# Patient Record
Sex: FEMALE | Race: BLACK OR AFRICAN AMERICAN | Employment: UNEMPLOYED | URBAN - METROPOLITAN AREA
[De-identification: names, ages, dates, MRNs, and addresses within clinical notes are randomized per-mention and may not be internally consistent; named-entity substitution may affect disease eponyms.]

---

## 2018-03-02 ENCOUNTER — APPOINTMENT (OUTPATIENT)
Dept: GENERAL RADIOLOGY | Age: 63
End: 2018-03-02
Attending: EMERGENCY MEDICINE
Payer: SELF-PAY

## 2018-03-02 ENCOUNTER — HOSPITAL ENCOUNTER (EMERGENCY)
Age: 63
Discharge: HOME | End: 2018-03-02
Attending: EMERGENCY MEDICINE
Payer: SELF-PAY

## 2018-03-02 VITALS
WEIGHT: 204.59 LBS | HEIGHT: 64 IN | HEART RATE: 68 BPM | DIASTOLIC BLOOD PRESSURE: 91 MMHG | RESPIRATION RATE: 19 BRPM | TEMPERATURE: 99.2 F | BODY MASS INDEX: 34.93 KG/M2 | SYSTOLIC BLOOD PRESSURE: 187 MMHG | OXYGEN SATURATION: 98 %

## 2018-03-02 DIAGNOSIS — I10 ESSENTIAL HYPERTENSION WITH GOAL BLOOD PRESSURE LESS THAN 130/80: ICD-10-CM

## 2018-03-02 DIAGNOSIS — I10 ESSENTIAL HYPERTENSION: ICD-10-CM

## 2018-03-02 DIAGNOSIS — R07.9 ACUTE CHEST PAIN: Primary | ICD-10-CM

## 2018-03-02 LAB
ANION GAP SERPL CALC-SCNC: 5 MMOL/L (ref 5–15)
ATRIAL RATE: 82 BPM
BASOPHILS # BLD: 0 K/UL (ref 0–0.1)
BASOPHILS NFR BLD: 0 % (ref 0–1)
BUN SERPL-MCNC: 15 MG/DL (ref 6–20)
BUN/CREAT SERPL: 20 (ref 12–20)
CALCIUM SERPL-MCNC: 8.9 MG/DL (ref 8.5–10.1)
CALCULATED P AXIS, ECG09: 49 DEGREES
CALCULATED R AXIS, ECG10: -7 DEGREES
CALCULATED T AXIS, ECG11: 96 DEGREES
CHLORIDE SERPL-SCNC: 105 MMOL/L (ref 97–108)
CO2 SERPL-SCNC: 31 MMOL/L (ref 21–32)
CREAT SERPL-MCNC: 0.76 MG/DL (ref 0.55–1.02)
DIAGNOSIS, 93000: NORMAL
DIFFERENTIAL METHOD BLD: ABNORMAL
EOSINOPHIL # BLD: 0.1 K/UL (ref 0–0.4)
EOSINOPHIL NFR BLD: 2 % (ref 0–7)
ERYTHROCYTE [DISTWIDTH] IN BLOOD BY AUTOMATED COUNT: 13.2 % (ref 11.5–14.5)
GLUCOSE SERPL-MCNC: 164 MG/DL (ref 65–100)
HCT VFR BLD AUTO: 38.8 % (ref 35–47)
HGB BLD-MCNC: 13 G/DL (ref 11.5–16)
IMM GRANULOCYTES # BLD: 0.1 K/UL (ref 0–0.04)
IMM GRANULOCYTES NFR BLD AUTO: 1 % (ref 0–0.5)
INR PPP: 1 (ref 0.9–1.1)
LYMPHOCYTES # BLD: 1.9 K/UL (ref 0.8–3.5)
LYMPHOCYTES NFR BLD: 24 % (ref 12–49)
MCH RBC QN AUTO: 30.4 PG (ref 26–34)
MCHC RBC AUTO-ENTMCNC: 33.5 G/DL (ref 30–36.5)
MCV RBC AUTO: 90.7 FL (ref 80–99)
MONOCYTES # BLD: 0.5 K/UL (ref 0–1)
MONOCYTES NFR BLD: 6 % (ref 5–13)
NEUTS SEG # BLD: 5.4 K/UL (ref 1.8–8)
NEUTS SEG NFR BLD: 67 % (ref 32–75)
NRBC # BLD: 0 K/UL (ref 0–0.01)
NRBC BLD-RTO: 0 PER 100 WBC
P-R INTERVAL, ECG05: 166 MS
PLATELET # BLD AUTO: 281 K/UL (ref 150–400)
PMV BLD AUTO: 9.7 FL (ref 8.9–12.9)
POTASSIUM SERPL-SCNC: 3.4 MMOL/L (ref 3.5–5.1)
PROTHROMBIN TIME: 10 SEC (ref 9–11.1)
Q-T INTERVAL, ECG07: 344 MS
QRS DURATION, ECG06: 88 MS
QTC CALCULATION (BEZET), ECG08: 401 MS
RBC # BLD AUTO: 4.28 M/UL (ref 3.8–5.2)
SODIUM SERPL-SCNC: 141 MMOL/L (ref 136–145)
TROPONIN I SERPL-MCNC: <0.04 NG/ML
VENTRICULAR RATE, ECG03: 82 BPM
WBC # BLD AUTO: 8 K/UL (ref 3.6–11)

## 2018-03-02 PROCEDURE — 71045 X-RAY EXAM CHEST 1 VIEW: CPT

## 2018-03-02 PROCEDURE — 85610 PROTHROMBIN TIME: CPT | Performed by: EMERGENCY MEDICINE

## 2018-03-02 PROCEDURE — 36415 COLL VENOUS BLD VENIPUNCTURE: CPT | Performed by: EMERGENCY MEDICINE

## 2018-03-02 PROCEDURE — 99285 EMERGENCY DEPT VISIT HI MDM: CPT

## 2018-03-02 PROCEDURE — 80048 BASIC METABOLIC PNL TOTAL CA: CPT | Performed by: EMERGENCY MEDICINE

## 2018-03-02 PROCEDURE — 93005 ELECTROCARDIOGRAM TRACING: CPT

## 2018-03-02 PROCEDURE — 74011250637 HC RX REV CODE- 250/637: Performed by: EMERGENCY MEDICINE

## 2018-03-02 PROCEDURE — 84484 ASSAY OF TROPONIN QUANT: CPT | Performed by: EMERGENCY MEDICINE

## 2018-03-02 PROCEDURE — 85025 COMPLETE CBC W/AUTO DIFF WBC: CPT | Performed by: EMERGENCY MEDICINE

## 2018-03-02 RX ORDER — ASPIRIN 325 MG
325 TABLET ORAL DAILY
Qty: 30 TAB | Refills: 0 | Status: SHIPPED | OUTPATIENT
Start: 2018-03-02 | End: 2019-05-22

## 2018-03-02 RX ORDER — AMLODIPINE BESYLATE 10 MG/1
10 TABLET ORAL DAILY
Qty: 90 TAB | Refills: 1 | Status: SHIPPED | OUTPATIENT
Start: 2018-03-02 | End: 2018-09-05 | Stop reason: SDUPTHER

## 2018-03-02 RX ORDER — ACETAMINOPHEN 500 MG
1000 TABLET ORAL
Status: DISCONTINUED | OUTPATIENT
Start: 2018-03-02 | End: 2018-03-02 | Stop reason: HOSPADM

## 2018-03-02 RX ORDER — NITROGLYCERIN 0.4 MG/1
0.4 TABLET SUBLINGUAL
Status: COMPLETED | OUTPATIENT
Start: 2018-03-02 | End: 2018-03-02

## 2018-03-02 RX ORDER — AMLODIPINE BESYLATE 5 MG/1
10 TABLET ORAL DAILY
Status: DISCONTINUED | OUTPATIENT
Start: 2018-03-02 | End: 2018-03-02 | Stop reason: HOSPADM

## 2018-03-02 RX ORDER — ASPIRIN 325 MG
325 TABLET ORAL ONCE
Status: COMPLETED | OUTPATIENT
Start: 2018-03-02 | End: 2018-03-02

## 2018-03-02 RX ORDER — LISINOPRIL 5 MG/1
10 TABLET ORAL
Status: COMPLETED | OUTPATIENT
Start: 2018-03-02 | End: 2018-03-02

## 2018-03-02 RX ORDER — LISINOPRIL 10 MG/1
10 TABLET ORAL DAILY
Qty: 90 TAB | Refills: 1 | Status: SHIPPED | OUTPATIENT
Start: 2018-03-02 | End: 2018-03-08 | Stop reason: SDUPTHER

## 2018-03-02 RX ADMIN — LISINOPRIL 10 MG: 5 TABLET ORAL at 09:05

## 2018-03-02 RX ADMIN — NITROGLYCERIN 0.5 INCH: 20 OINTMENT TOPICAL at 07:25

## 2018-03-02 RX ADMIN — ASPIRIN 325 MG: 325 TABLET ORAL at 07:25

## 2018-03-02 RX ADMIN — NITROGLYCERIN 0.4 MG: 0.4 TABLET SUBLINGUAL at 07:25

## 2018-03-02 RX ADMIN — AMLODIPINE BESYLATE 10 MG: 5 TABLET ORAL at 09:04

## 2018-03-02 NOTE — DISCHARGE INSTRUCTIONS
Chest Pain: Care Instructions  Your Care Instructions  There are many things that can cause chest pain. Some are not serious and will get better on their own in a few days. But some kinds of chest pain need more testing and treatment. Your doctor may have recommended a follow-up visit in the next 8 to 12 hours. If you are not getting better, you may need more tests or treatment. Even though your doctor has released you, you still need to watch for any problems. The doctor carefully checked you, but sometimes problems can develop later. If you have new symptoms or if your symptoms do not get better, get medical care right away. If you have worse or different chest pain or pressure that lasts more than 5 minutes or you passed out (lost consciousness), call 911 or seek other emergency help right away. A medical visit is only one step in your treatment. Even if you feel better, you still need to do what your doctor recommends, such as going to all suggested follow-up appointments and taking medicines exactly as directed. This will help you recover and help prevent future problems. How can you care for yourself at home? · Rest until you feel better. · Take your medicine exactly as prescribed. Call your doctor if you think you are having a problem with your medicine. · Do not drive after taking a prescription pain medicine. When should you call for help? Call 911 if:  ? · You passed out (lost consciousness). ? · You have severe difficulty breathing. ? · You have symptoms of a heart attack. These may include:  ¨ Chest pain or pressure, or a strange feeling in your chest.  ¨ Sweating. ¨ Shortness of breath. ¨ Nausea or vomiting. ¨ Pain, pressure, or a strange feeling in your back, neck, jaw, or upper belly or in one or both shoulders or arms. ¨ Lightheadedness or sudden weakness. ¨ A fast or irregular heartbeat.   After you call 911, the  may tell you to chew 1 adult-strength or 2 to 4 low-dose aspirin. Wait for an ambulance. Do not try to drive yourself. ?Call your doctor today if:  ? · You have any trouble breathing. ? · Your chest pain gets worse. ? · You are dizzy or lightheaded, or you feel like you may faint. ? · You are not getting better as expected. ? · You are having new or different chest pain. Where can you learn more? Go to http://patrice-alia.info/. Enter A120 in the search box to learn more about \"Chest Pain: Care Instructions. \"  Current as of: March 20, 2017  Content Version: 11.4  © 8450-3018 [employer]. Care instructions adapted under license by [employer] (which disclaims liability or warranty for this information). If you have questions about a medical condition or this instruction, always ask your healthcare professional. [address] any warranty or liability for your use of this information. DASH Diet: Care Instructions  Your Care Instructions    The DASH diet is an eating plan that can help lower your blood pressure. DASH stands for Dietary Approaches to Stop Hypertension. Hypertension is high blood pressure. The DASH diet focuses on eating foods that are high in calcium, potassium, and magnesium. These nutrients can lower blood pressure. The foods that are highest in these nutrients are fruits, vegetables, low-fat dairy products, nuts, seeds, and legumes. But taking calcium, potassium, and magnesium supplements instead of eating foods that are high in those nutrients does not have the same effect. The DASH diet also includes whole grains, fish, and poultry. The DASH diet is one of several lifestyle changes your doctor may recommend to lower your high blood pressure. Your doctor may also want you to decrease the amount of sodium in your diet. Lowering sodium while following the DASH diet can lower blood pressure even further than just the DASH diet alone.   Follow-up care is a key part of your treatment and safety. Be sure to make and go to all appointments, and call your doctor if you are having problems. It's also a good idea to know your test results and keep a list of the medicines you take. How can you care for yourself at home? Following the DASH diet  · Eat 4 to 5 servings of fruit each day. A serving is 1 medium-sized piece of fruit, ½ cup chopped or canned fruit, 1/4 cup dried fruit, or 4 ounces (½ cup) of fruit juice. Choose fruit more often than fruit juice. · Eat 4 to 5 servings of vegetables each day. A serving is 1 cup of lettuce or raw leafy vegetables, ½ cup of chopped or cooked vegetables, or 4 ounces (½ cup) of vegetable juice. Choose vegetables more often than vegetable juice. · Get 2 to 3 servings of low-fat and fat-free dairy each day. A serving is 8 ounces of milk, 1 cup of yogurt, or 1 ½ ounces of cheese. · Eat 6 to 8 servings of grains each day. A serving is 1 slice of bread, 1 ounce of dry cereal, or ½ cup of cooked rice, pasta, or cooked cereal. Try to choose whole-grain products as much as possible. · Limit lean meat, poultry, and fish to 2 servings each day. A serving is 3 ounces, about the size of a deck of cards. · Eat 4 to 5 servings of nuts, seeds, and legumes (cooked dried beans, lentils, and split peas) each week. A serving is 1/3 cup of nuts, 2 tablespoons of seeds, or ½ cup of cooked beans or peas. · Limit fats and oils to 2 to 3 servings each day. A serving is 1 teaspoon of vegetable oil or 2 tablespoons of salad dressing. · Limit sweets and added sugars to 5 servings or less a week. A serving is 1 tablespoon jelly or jam, ½ cup sorbet, or 1 cup of lemonade. · Eat less than 2,300 milligrams (mg) of sodium a day. If you limit your sodium to 1,500 mg a day, you can lower your blood pressure even more. Tips for success  · Start small. Do not try to make dramatic changes to your diet all at once.  You might feel that you are missing out on your favorite foods and then be more likely to not follow the plan. Make small changes, and stick with them. Once those changes become habit, add a few more changes. · Try some of the following:  ¨ Make it a goal to eat a fruit or vegetable at every meal and at snacks. This will make it easy to get the recommended amount of fruits and vegetables each day. ¨ Try yogurt topped with fruit and nuts for a snack or healthy dessert. ¨ Add lettuce, tomato, cucumber, and onion to sandwiches. ¨ Combine a ready-made pizza crust with low-fat mozzarella cheese and lots of vegetable toppings. Try using tomatoes, squash, spinach, broccoli, carrots, cauliflower, and onions. ¨ Have a variety of cut-up vegetables with a low-fat dip as an appetizer instead of chips and dip. ¨ Sprinkle sunflower seeds or chopped almonds over salads. Or try adding chopped walnuts or almonds to cooked vegetables. ¨ Try some vegetarian meals using beans and peas. Add garbanzo or kidney beans to salads. Make burritos and tacos with mashed alford beans or black beans. Where can you learn more? Go to http://patrice-alia.info/. Enter P559 in the search box to learn more about \"DASH Diet: Care Instructions. \"  Current as of: September 21, 2016  Content Version: 11.4  © 8398-1463 [employer]. Care instructions adapted under license by [employer] (which disclaims liability or warranty for this information). If you have questions about a medical condition or this instruction, always ask your healthcare professional. [place] [address] any warranty or liability for your use of this information.

## 2018-03-02 NOTE — ED NOTES
Complaint of left sided chest pressure, intermittent, feeling like heaviness, since yesterday around 1630. Pt denies associated sob, n/v/diaphoresis, relieving or aggravating factors.

## 2018-03-02 NOTE — ED NOTES
Dr Yoly Faria reviewed discharge instructions with the patient. The patient verbalized understanding. All questions and concerns were addressed. The patient declined a wheelchair and is discharged ambulatory in the care of family members with instructions and prescriptions in hand. Pt is alert and oriented x 4. Respirations are clear and unlabored.

## 2018-03-02 NOTE — CONSULTS
Consult    NAME: Bret Fletcher   :  1955   MRN:  [id number]     Date/Time:  3/2/2018 9:48 AM    Patient PCP: Althea Bennett MD  ________________________________________________________________________     Assessment:     1. Uncontrolled hypertension, atypical chest pain  2. Reports prior negative stress test  3. ECG with LVH with repolarization  4. HTN  5. Allergies  6. Negative family hx of for premature CAD  7. No prior tobacco use  8. Non-compliance with meds due to financial issues  9. ,  disabled vet, currently unemployed, says very active         Plan:     Ran out of meds six months ago. Yesterday atypical chest pain, ECG with LVH, negative cardiac enzymes. Feels better with control of blood pressure. 1. Start aspirin   2. Resume amlodipine 10mg daily  3. Resume lisinopril 10mg daily     to see and provide primary care/medicine assistance. Plan for discharge as long as bp improving, feeling ok. Return to ER with any exertional pain. [x]        High complexity decision making was performed        Subjective:   CHIEF COMPLAINT: Chest pain      HISTORY OF PRESENT ILLNESS:       Bret Fletcher, 63 y.o. female with PMHx significant for HTN, presents ambulatory to the ED with cc of episode of diffuse upper CP, onset yesterday afternoon. She rates the pain at onset moderate, describes as sharp/\"punching\", and denies modifying factors or associated symptoms. Pt denies taking medication for pain. She states the episode was 10 minutes in duration and reports a \"lingering ache\" today, prompting ED evaluation. Pt denies hx of prior similar sx. She denies hx of cardiology evaluation. Pt reports hx of HTN and notes her BP has been elevated at every doctor's appointment. She notes ankle swelling at baseline, denies recent worsening. Pt specifically denies fever/chills, SOB, cough, abdominal pain, or N/V/D.      Atypical left chest pulling yesterday, lasted for 10 min, resolved on own. No radiation, no nausea, no diaphoresis. Currently feeling well after administration of bp meds. We were asked to consult for work up and evaluation of the above problems. Past Medical History:   Diagnosis Date    Hepatic steatosis 2013    CT imaging.  Hypertension     Pap smear for cervical cancer screening 07/2016    Normal with negative HPV--repeat 2yr with gyn. History reviewed. No pertinent surgical history. Allergies   Allergen Reactions    Naprosyn [Naproxen] Other (comments)     Hallucinations. Tolerates Ibuprofen without problems. Meds:  See below  Social History   Substance Use Topics    Smoking status: Never Smoker    Smokeless tobacco: Never Used    Alcohol use 0.0 oz/week     0 Standard drinks or equivalent per week      Comment: occasionally      Family History   Problem Relation Age of Onset    Heart Disease Father     Hypertension Father     Glaucoma Father     Other Mother      ulcers       REVIEW OF SYSTEMS:     []         Unable to obtain  ROS due to ---   [x]         Total of 12 systems reviewed as follows:     Total of 12 systems reviewed as follows:       POSITIVE= Bold text  Negative = normal text  General:  fever, chills, sweats, generalized weakness, weight loss/gain,      loss of appetite   Eyes:    blurred vision, eye pain, loss of vision, double vision  ENT:    rhinorrhea, pharyngitis   Respiratory:   cough, sputum production, SOB, MORGAN, wheezing, pleuritic pain   Cardiology:   chest pain, palpitations, orthopnea, PND, edema, syncope   Gastrointestinal:  abdominal pain , N/V, diarrhea, dysphagia, constipation, bleeding   Genitourinary:  frequency, urgency, dysuria, hematuria, incontinence   Muskuloskeletal :  arthralgia, myalgia, back pain  Hematology:  easy bruising, nose or gum bleeding, lymphadenopathy   Dermatological: rash, ulceration, pruritis, color change / jaundice  Endocrine:   hot flashes or polydipsia Neurological:  headache, dizziness, confusion, focal weakness, paresthesia,     Speech difficulties, memory loss, gait difficulty  Psychological: Feelings of anxiety, depression, agitation    Objective:      Physical Exam:    Last 24hrs VS reviewed since prior progress note. Most recent are:    Visit Vitals    BP (!) 196/98    Pulse 78    Temp 99.2 °F (37.3 °C)    Resp 22    Ht 5' 4\" (1.626 m)    Wt 92.8 kg (204 lb 9.4 oz)    SpO2 97%    BMI 35.12 kg/m2     No intake or output data in the 24 hours ending 03/02/18 0948     General Appearance: Well developed, well nourished, alert & oriented x 3,    no acute distress. Ears/Nose/Mouth/Throat: Pupils equal and round, Hearing grossly normal.  Neck: Supple. JVP within normal limits. Carotids good upstrokes, with no bruit. Chest: Lungs clear to auscultation bilaterally. Cardiovascular: Regular rate and rhythm, S1S2 normal, no murmur, rubs, gallops. Abdomen: Soft, non-tender, bowel sounds are active. No organomegaly. Extremities: No edema bilaterally. Femoral pulses +2, Distal Pulses +1. Skin: Warm and dry. Neuro: CN II-XII grossly intact, Strength and sensation grossly intact. Data:      Prior to Admission medications    Medication Sig Start Date End Date Taking? Authorizing Provider   aspirin (ASPIRIN) 325 mg tablet Take 1 Tab by mouth daily. 3/2/18  Yes Beka Zelaya. MD Ifeanyi   amLODIPine (NORVASC) 10 mg tablet Take 1 Tab by mouth daily. 3/2/18  Yes Beka Zelaya. MD Ifeanyi   lisinopril (PRINIVIL, ZESTRIL) 10 mg tablet Take 1 Tab by mouth daily. Take daily with 10mg amlodipine tablet as reviewed. 3/2/18  Yes Beka Zelaya. MD Ifeanyi   cetirizine (ZYRTEC) 10 mg tablet Take 1 Tab by mouth nightly.  8/22/16   Lara Granados MD       Recent Results (from the past 24 hour(s))   EKG, 12 LEAD, INITIAL    Collection Time: 03/02/18  7:02 AM   Result Value Ref Range    Ventricular Rate 82 BPM    Atrial Rate 82 BPM    P-R Interval 166 ms    QRS Duration 88 ms    Q-T Interval 344 ms    QTC Calculation (Bezet) 401 ms    Calculated P Axis 49 degrees    Calculated R Axis -7 degrees    Calculated T Axis 96 degrees    Diagnosis       Normal sinus rhythm  Possible Left atrial enlargement  Left ventricular hypertrophy  Nonspecific ST and T wave abnormality  No previous ECGs available  Confirmed by Berto Narayanan ([id number]) on 3/2/2018 8:42:01 AM     CBC WITH AUTOMATED DIFF    Collection Time: 03/02/18  7:24 AM   Result Value Ref Range    WBC 8.0 3.6 - 11.0 K/uL    RBC 4.28 3.80 - 5.20 M/uL    HGB 13.0 11.5 - 16.0 g/dL    HCT 38.8 35.0 - 47.0 %    MCV 90.7 80.0 - 99.0 FL    MCH 30.4 26.0 - 34.0 PG    MCHC 33.5 30.0 - 36.5 g/dL    RDW 13.2 11.5 - 14.5 %    PLATELET 983 229 - 921 K/uL    MPV 9.7 8.9 - 12.9 FL    NRBC 0.0 0  WBC    ABSOLUTE NRBC 0.00 0.00 - 0.01 K/uL    NEUTROPHILS 67 32 - 75 %    LYMPHOCYTES 24 12 - 49 %    MONOCYTES 6 5 - 13 %    EOSINOPHILS 2 0 - 7 %    BASOPHILS 0 0 - 1 %    IMMATURE GRANULOCYTES 1 (H) 0.0 - 0.5 %    ABS. NEUTROPHILS 5.4 1.8 - 8.0 K/UL    ABS. LYMPHOCYTES 1.9 0.8 - 3.5 K/UL    ABS. MONOCYTES 0.5 0.0 - 1.0 K/UL    ABS. EOSINOPHILS 0.1 0.0 - 0.4 K/UL    ABS. BASOPHILS 0.0 0.0 - 0.1 K/UL    ABS. IMM.  GRANS. 0.1 (H) 0.00 - 0.04 K/UL    DF AUTOMATED     METABOLIC PANEL, BASIC    Collection Time: 03/02/18  7:24 AM   Result Value Ref Range    Sodium 141 136 - 145 mmol/L    Potassium 3.4 (L) 3.5 - 5.1 mmol/L    Chloride 105 97 - 108 mmol/L    CO2 31 21 - 32 mmol/L    Anion gap 5 5 - 15 mmol/L    Glucose 164 (H) 65 - 100 mg/dL    BUN 15 6 - 20 MG/DL    Creatinine 0.76 0.55 - 1.02 MG/DL    BUN/Creatinine ratio 20 12 - 20      GFR est AA >60 >60 ml/min/1.73m2    GFR est non-AA >60 >60 ml/min/1.73m2    Calcium 8.9 8.5 - 10.1 MG/DL   PROTHROMBIN TIME + INR    Collection Time: 03/02/18  7:24 AM   Result Value Ref Range    INR 1.0 0.9 - 1.1      Prothrombin time 10.0 9.0 - 11.1 sec   TROPONIN I    Collection Time: 03/02/18  7:24 AM   Result Value Ref Range    Troponin-I, Qt. <0.04 <0.05 ng/mL

## 2018-03-02 NOTE — ED PROVIDER NOTES
EMERGENCY DEPARTMENT HISTORY AND PHYSICAL EXAM      Date: 3/2/2018  Patient Name: Tracy Awan    History of Presenting Illness     No chief complaint on file. History Provided By: Patient    HPI: Tracy Awan, 63 y.o. female with PMHx significant for HTN, presents ambulatory to the ED with cc of episode of non-radiating diffuse upper CP, onset yesterday afternoon. She rates the pain at onset moderate, describes as sharp/\"punching\", and denies modifying factors or associated symptoms. Pt denies taking medication for pain. She states the episode was 10 minutes in duration and reports a \"lingering ache\" today, prompting ED evaluation. Pt denies hx of prior similar sx. She denies hx of cardiology evaluation. Pt reports hx of HTN and notes her BP has been elevated at every doctor's appointment. She notes ankle swelling at baseline, denies recent worsening. Pt specifically denies fever/chills, SOB, cough, abdominal pain, or N/V/D.     PCP: Joann Chase MD    There are no other complaints, changes, or physical findings at this time. Current Outpatient Prescriptions   Medication Sig Dispense Refill    amLODIPine (NORVASC) 10 mg tablet Take 1 Tab by mouth daily. 90 Tab 1    lisinopril (PRINIVIL, ZESTRIL) 10 mg tablet Take 1 Tab by mouth daily. Take daily with 10mg amlodipine tablet as reviewed. 90 Tab 1    Blood Pressure Monitor kit 1 Each by Does Not Apply route as needed. 1 Kit 0    cetirizine (ZYRTEC) 10 mg tablet Take 1 Tab by mouth nightly. 30 Tab 5    meloxicam (MOBIC) 15 mg tablet Take 1 Tab by mouth daily. 30 Tab 0       Past History     Past Medical History:  Past Medical History:   Diagnosis Date    Hepatic steatosis 2013    CT imaging.  Hypertension     Pap smear for cervical cancer screening 07/2016    Normal with negative HPV--repeat 2yr with gyn. Past Surgical History:  No past surgical history on file.     Family History:  Family History   Problem Relation Age of Onset  Heart Disease Father     Hypertension Father     Glaucoma Father     Other Mother      ulcers       Social History:  Social History   Substance Use Topics    Smoking status: Never Smoker    Smokeless tobacco: Never Used    Alcohol use 0.0 oz/week     0 Standard drinks or equivalent per week      Comment: occasionally       Allergies: Allergies   Allergen Reactions    Naprosyn [Naproxen] Other (comments)     Hallucinations. Tolerates Ibuprofen without problems. Review of Systems   Review of Systems   Constitutional: Negative for activity change, appetite change, chills, fever and unexpected weight change. HENT: Negative for congestion. Eyes: Negative for pain and visual disturbance. Respiratory: Negative for cough and shortness of breath. Cardiovascular: Positive for chest pain and leg swelling (ankle, denies recent worsening). Gastrointestinal: Negative for abdominal pain, diarrhea, nausea and vomiting. Genitourinary: Negative for dysuria. Musculoskeletal: Negative for back pain. Skin: Negative for rash. Neurological: Negative for headaches. All other systems reviewed and are negative. Physical Exam   Physical Exam   Constitutional: She is oriented to person, place, and time. She appears well-developed and well-nourished. HENT:   Head: Normocephalic and atraumatic. Mouth/Throat: Oropharynx is clear and moist.   Eyes: Conjunctivae and EOM are normal. Pupils are equal, round, and reactive to light. Right eye exhibits no discharge. Left eye exhibits no discharge. Neck: Normal range of motion. Neck supple. Cardiovascular: Normal rate, regular rhythm and normal heart sounds. No murmur heard. Pulmonary/Chest: Effort normal and breath sounds normal. No respiratory distress. She has no wheezes. She has no rales. Abdominal: Soft. Bowel sounds are normal. She exhibits no distension. There is no tenderness. Musculoskeletal: Normal range of motion.  She exhibits no edema. Neurological: She is alert and oriented to person, place, and time. No cranial nerve deficit. She exhibits normal muscle tone. Skin: Skin is warm and dry. No rash noted. She is not diaphoretic. Nursing note and vitals reviewed. Diagnostic Study Results     Labs -     Recent Results (from the past 12 hour(s))   EKG, 12 LEAD, INITIAL    Collection Time: 03/02/18  7:02 AM   Result Value Ref Range    Ventricular Rate 82 BPM    Atrial Rate 82 BPM    P-R Interval 166 ms    QRS Duration 88 ms    Q-T Interval 344 ms    QTC Calculation (Bezet) 401 ms    Calculated P Axis 49 degrees    Calculated R Axis -7 degrees    Calculated T Axis 96 degrees    Diagnosis       Normal sinus rhythm  Possible Left atrial enlargement  Left ventricular hypertrophy  Nonspecific ST and T wave abnormality  No previous ECGs available  Confirmed by Sheryle Neth ([id number]) on 3/2/2018 8:42:01 AM     CBC WITH AUTOMATED DIFF    Collection Time: 03/02/18  7:24 AM   Result Value Ref Range    WBC 8.0 3.6 - 11.0 K/uL    RBC 4.28 3.80 - 5.20 M/uL    HGB 13.0 11.5 - 16.0 g/dL    HCT 38.8 35.0 - 47.0 %    MCV 90.7 80.0 - 99.0 FL    MCH 30.4 26.0 - 34.0 PG    MCHC 33.5 30.0 - 36.5 g/dL    RDW 13.2 11.5 - 14.5 %    PLATELET 039 828 - 411 K/uL    MPV 9.7 8.9 - 12.9 FL    NRBC 0.0 0  WBC    ABSOLUTE NRBC 0.00 0.00 - 0.01 K/uL    NEUTROPHILS 67 32 - 75 %    LYMPHOCYTES 24 12 - 49 %    MONOCYTES 6 5 - 13 %    EOSINOPHILS 2 0 - 7 %    BASOPHILS 0 0 - 1 %    IMMATURE GRANULOCYTES 1 (H) 0.0 - 0.5 %    ABS. NEUTROPHILS 5.4 1.8 - 8.0 K/UL    ABS. LYMPHOCYTES 1.9 0.8 - 3.5 K/UL    ABS. MONOCYTES 0.5 0.0 - 1.0 K/UL    ABS. EOSINOPHILS 0.1 0.0 - 0.4 K/UL    ABS. BASOPHILS 0.0 0.0 - 0.1 K/UL    ABS. IMM.  GRANS. 0.1 (H) 0.00 - 0.04 K/UL    DF AUTOMATED     METABOLIC PANEL, BASIC    Collection Time: 03/02/18  7:24 AM   Result Value Ref Range    Sodium 141 136 - 145 mmol/L    Potassium 3.4 (L) 3.5 - 5.1 mmol/L    Chloride 105 97 - 108 mmol/L    CO2 31 21 - 32 mmol/L    Anion gap 5 5 - 15 mmol/L    Glucose 164 (H) 65 - 100 mg/dL    BUN 15 6 - 20 MG/DL    Creatinine 0.76 0.55 - 1.02 MG/DL    BUN/Creatinine ratio 20 12 - 20      GFR est AA >60 >60 ml/min/1.73m2    GFR est non-AA >60 >60 ml/min/1.73m2    Calcium 8.9 8.5 - 10.1 MG/DL   PROTHROMBIN TIME + INR    Collection Time: 03/02/18  7:24 AM   Result Value Ref Range    INR 1.0 0.9 - 1.1      Prothrombin time 10.0 9.0 - 11.1 sec   TROPONIN I    Collection Time: 03/02/18  7:24 AM   Result Value Ref Range    Troponin-I, Qt. <0.04 <0.05 ng/mL       Radiologic Studies -   EXAM:  XR CHEST PORT     INDICATION: Chest Pain     COMPARISON: 2/27/2016.     TECHNIQUE: Single frontal view of the chest.     FINDINGS:  Mildly hypoventilatory exam..  The cardiomediastinal silhouette is  within normal limits given the technique. No acute or aggressive osseous  lesion. .       IMPRESSION  IMPRESSION: No evidence of an acute cardiopulmonary process.              Medical Decision Making   I am the first provider for this patient. I reviewed the vital signs, available nursing notes, past medical history, past surgical history, family history and social history. Vital Signs-Reviewed the patient's vital signs. Patient Vitals for the past 12 hrs:   Temp Pulse Resp BP SpO2   03/02/18 1000 - 68 19 (!) 187/91 98 %   03/02/18 0930 - 65 19 (!) 199/94 99 %   03/02/18 0900 - 72 22 (!) 197/105 98 %   03/02/18 0830 - 69 21 (!) 189/95 98 %   03/02/18 0800 - 78 22 (!) 196/98 97 %   03/02/18 0735 - - - (!) 183/94 -   03/02/18 0730 - 80 18 (!) 169/109 99 %   03/02/18 0715 - 76 17 (!) 199/119 96 %   03/02/18 0710 99.2 °F (37.3 °C) 86 18 (!) 201/100 98 %     EKG interpretation: (4353)  Rhythm: normal sinus rhythm; and regular . Rate (approx.): 82; Axis: normal; AL interval: normal; QRS interval: normal ; ST/T wave: T wave inversion in I, AVL, V5-6; Other findings: left ventricular hypertrophy.   Written by BERNABE Miller, as dictated by Calixto Kinney MD.    Records Reviewed: Nursing Notes and Old Medical Records    Provider Notes (Medical Decision Making): Moderate risk middle-aged female with abnormal EKG and prior to compare, c/f prior ischemia. Currently hypertensive but pt admits to elevated BP at any doctor's visit. She does not have any clinical evidence of malignant HTN at this time. ED Course:   Initial assessment performed. The patients presenting problems have been discussed, and they are in agreement with the care plan formulated and outlined with them. I have encouraged them to ask questions as they arise throughout their visit. Progress Note:  8:30 AM  Pt re-evaluated. She reports her CP has resolved. Pt notes she has been off her HTN medication for months due to an insurance change. Written by BERNABE Quintero, as dictated by Calixto Kinney MD.     CONSULT NOTE:   8:46 AM  Calixto Kinney MD spoke with Dr. Vika Ruiz,   Specialty: Cardiology  Discussed pt's hx, disposition, and available diagnostic and imaging results. Reviewed care plans. Consultant agrees with plans as outlined. Dr. Vika Ruiz to evaluate the pt in the ED. Written by BERNABE Quintero, as dictated by Calixto Kinney MD.     Progress Note:  10:15 AM  Dr. Vika Ruiz evaluated pt in the ED. He agrees with anti-hypertensives and outpatient management. Written by BERNABE Quintero, as dictated by Calixto Kinney MD.     Disposition:    DISCHARGE NOTE  10:04 AM  The patient has been re-evaluated and is ready for discharge. Reviewed available results with patient. Counseled pt on diagnosis and care plan. Pt has expressed understanding, and all questions have been answered. Pt agrees with plan and agrees to F/U as recommended, or return to the ED if their sxs worsen. Discharge instructions have been provided and explained to the pt, along with reasons to return to the ED.   Written by BERNABE Quintero, as dictated by Giovanny Joseph MD.    PLAN:  1. Current Discharge Medication List      START taking these medications    Details   aspirin (ASPIRIN) 325 mg tablet Take 1 Tab by mouth daily. Qty: 30 Tab, Refills: 0         CONTINUE these medications which have CHANGED    Details   amLODIPine (NORVASC) 10 mg tablet Take 1 Tab by mouth daily. Qty: 90 Tab, Refills: 1    Associated Diagnoses: Essential hypertension with goal blood pressure less than 130/80      lisinopril (PRINIVIL, ZESTRIL) 10 mg tablet Take 1 Tab by mouth daily. Take daily with 10mg amlodipine tablet as reviewed. Qty: 90 Tab, Refills: 1    Associated Diagnoses: Essential hypertension with goal blood pressure less than 130/80           2. Follow-up Information     Follow up With Details Comments Contact Info    [facility] EMERGENCY DEPT  If symptoms worsen [address] Shanda Naidu MD Schedule an appointment as soon as possible for a visit  [address]  [id number]  [phone]          Return to ED if worse     Diagnosis     Clinical Impression:   1. Acute chest pain    2. Essential hypertension    3. Essential hypertension with goal blood pressure less than 130/80        Attestations: This note is prepared by Gale Shrestha, acting as Scribe for Giovanny Joseph MD.    Giovanny Joseph MD: The scribe's documentation has been prepared under my direction and personally reviewed by me in its entirety. I confirm that the note above accurately reflects all work, treatment, procedures, and medical decision making performed by me.

## 2018-03-02 NOTE — ED NOTES
After administration of nitroglycerin, pt initially reported a headache and requested Tylenol. MD informed. Verbal order for Tylenol received; however, by the time Tylenol was brought to the bedside, pt stated that her pain had decreased somewhat and she did not feel the need for medication. Tylenol returned to pyxis.

## 2018-03-08 ENCOUNTER — OFFICE VISIT (OUTPATIENT)
Dept: INTERNAL MEDICINE CLINIC | Age: 63
End: 2018-03-08

## 2018-03-08 VITALS
HEIGHT: 64 IN | WEIGHT: 200 LBS | TEMPERATURE: 98 F | OXYGEN SATURATION: 97 % | HEART RATE: 78 BPM | SYSTOLIC BLOOD PRESSURE: 179 MMHG | BODY MASS INDEX: 34.15 KG/M2 | RESPIRATION RATE: 16 BRPM | DIASTOLIC BLOOD PRESSURE: 94 MMHG

## 2018-03-08 DIAGNOSIS — I10 ESSENTIAL HYPERTENSION WITH GOAL BLOOD PRESSURE LESS THAN 130/80: ICD-10-CM

## 2018-03-08 RX ORDER — LISINOPRIL 10 MG/1
20 TABLET ORAL DAILY
Qty: 90 TAB | Refills: 1
Start: 2018-03-08 | End: 2018-06-05 | Stop reason: SDUPTHER

## 2018-03-08 NOTE — PROGRESS NOTES
RM 16    Chief Complaint   Patient presents with    Chest Pain     followup from ER on 3/2/18, chest tender/sore     1. Have you been to the ER, urgent care clinic since your last visit? Hospitalized since your last visit? Yes ED [facility] 3/2/18 for chest pain    2. Have you seen or consulted any other health care providers outside of the [address] [place] [address] since your last visit? Include any pap smears or colon screening. No    Health Maintenance Due   Topic Date Due    Hepatitis C Screening  1955    DTaP/Tdap/Td series (1 - Tdap) 03/07/1978    BREAST CANCER SCRN MAMMOGRAM  06/19/2014    ZOSTER VACCINE AGE 60>  01/07/2017    Influenza Age 9 to Adult  08/01/2017     Learning Assessment 3/8/2018   PRIMARY LEARNER Patient   HIGHEST LEVEL OF EDUCATION - PRIMARY LEARNER  GRADUATED HIGH SCHOOL OR GED   BARRIERS PRIMARY LEARNER NONE   PRIMARY LANGUAGE ENGLISH   LEARNER PREFERENCE PRIMARY DEMONSTRATION     READING     LISTENING   ANSWERED BY patient   RELATIONSHIP SELF       Patient declined flu vaccine.      PHQ over the last two weeks 3/8/2018   Little interest or pleasure in doing things Not at all   Feeling down, depressed or hopeless Several days   Total Score PHQ 2 1

## 2018-03-08 NOTE — PROGRESS NOTES
History of Present Illness:   Cee Washington is a 66 y.o. female here for evaluation:    Chief Complaint   Patient presents with    Chest Pain     followup from ER on 3/2/18, chest tender/sore     Last visit here Oct 2016--recommended 1mo follow-up for elev BP but had not returned for re-eval.  Last BP med refill from here Sept 2016 for 6mo supply. Refilled in ED for 90-days with recent visit there. She had elev BP in ED and CP--cardiac eval negative. She had resolution of CP in ED. Reviewed ED eval, labs, EKG (report and tracing). Copy of labs to pt today at visit. She notes daughter helped her fill her meds. She paid$60 for two 90-day prescriptions of her BP meds. She also got a 30-day aspirin script then. Reviewed free clinic information and Care Card access/application. Reviewed options for medication coverage with pt. Past Medical History:   Diagnosis Date    Hepatic steatosis 2013    CT imaging.  Hypertension     Pap smear for cervical cancer screening 07/2016    Normal with negative HPV--repeat 2yr with gyn. Prior to Admission medications    Medication Sig Start Date End Date Taking? Authorizing Provider   aspirin (ASPIRIN) 325 mg tablet Take 1 Tab by mouth daily. 3/2/18  Yes [employer]. MD Ifeanyi   amLODIPine (NORVASC) 10 mg tablet Take 1 Tab by mouth daily. 3/2/18  Yes [employer]. MD Ifeanyi   lisinopril (PRINIVIL, ZESTRIL) 10 mg tablet Take 1 Tab by mouth daily. Take daily with 10mg amlodipine tablet as reviewed. 3/2/18  Yes [employer]. MD Ifeanyi   cetirizine (ZYRTEC) 10 mg tablet Take 1 Tab by mouth nightly. 8/22/16   Ledy Michelle MD        ROS    Vitals:    03/08/18 0854   BP: (!) 179/94   Pulse: 78   Resp: 16   Temp: 98 °F (36.7 °C)   TempSrc: Oral   SpO2: 97%   Weight: 200 lb (90.7 kg)   Height: 5' 4\" (1.626 m)   PainSc:   3   PainLoc: Chest        Physical Exam:     Physical Exam   Constitutional: She appears well-developed and well-nourished.  No distress. HENT:   Head: Normocephalic and atraumatic. Eyes: Conjunctivae are normal. Right eye exhibits no discharge. Left eye exhibits no discharge. No scleral icterus. Neck: Neck supple. Cardiovascular: Normal rate, regular rhythm, normal heart sounds and intact distal pulses. Exam reveals no gallop and no friction rub. No murmur heard. Pulmonary/Chest: Effort normal and breath sounds normal. No respiratory distress. She has no wheezes. She has no rales. She exhibits no tenderness. Abdominal: Soft. Bowel sounds are normal. She exhibits no distension. There is no tenderness. Musculoskeletal: She exhibits no edema or tenderness. Neurological: She is alert. She exhibits normal muscle tone. Coordination normal.   Skin: Skin is warm. No rash noted. She is not diaphoretic. No erythema. No pallor. Psychiatric: She has a normal mood and affect. Her behavior is normal. Judgment and thought content normal.       Assessment and Plan:       ICD-10-CM ICD-9-CM    1. Essential hypertension with goal blood pressure less than 130/80 I10 401.9 lisinopril (PRINIVIL, ZESTRIL) 10 mg tablet       Reviewed EKG with pt and daughter at visit. Reviewed cards referral to Dr. Mari Scott. Plan at next visit once plan for care (marketplace, CareCard, free clinic/Access Now) reviewed. Follow-up Disposition:  Return in about 2 weeks (around 3/22/2018) for Blood Pressure follow-up.  reviewed medications and side effects in detail  use of aspirin to prevent MI and TIA's discussed  radiology results and schedule of future radiology studies reviewed with patient    For additional documentation of information and/or recommendations discussed this visit, please see notes in instructions. Plan and evaluation (above) reviewed with pt at visit  Patient voiced understanding of plan and provided with time to ask/review questions.   After Visit Summary (AVS) provided to pt after visit with additional instructions as needed/reviewed. Greater than 50% of the time in this 45 visit was spent by me in face-to-face counseling and coordination of care.

## 2018-03-08 NOTE — PATIENT INSTRUCTIONS
1.  Please call the Advanced Patient Advocacy number on the card to determine what options are available and if you qualify for [facility]. 2.  Please call Yaakov Segal LPN Panel manager here--she will review other options for medication coverage while you are clarifying the insurance/Care Card coverage as above. 3.  You can see in the interim, if other pharmacies offer generic costs for the two blood pressure meds you are taking. 4.  Until the 10mg lisinopril tabs are gone take 2 daily. You can take both with your morning Amlodipine 10mg dose. Once you have completed current prescription, we can refill as a single 20mg tab as reviewed. 5.  Free clinics in [facility] include:    [place] for [facility] Blood Pressure  [address]   [phone]    [facility]  Address: [address], [address], [facility] [address] [place] [address]  Phone: [phone], ext. [address] [facility] Hours  Monday through Thursday: 8:30 am - 5 pm   Friday: 8:30 am - 3 pm  On the first Friday of the month, both clinics are open 8:30 am - 12:30 pm      [facility] [place]  Address: [address] [facility] [place] [address], [address] [facility]  Across from [facility]; enter at the back of the building. Phone: [address] Hours  Monday, Wednesday, Thursday: 8:30 am - 5 pm   Tuesday: 8:30 am - 8 pm   Friday: 8:30 am - 3 pm  On the first Friday of the month, both clinics are open 8:30 am - 12:30 pm      [address] [facility]  Patients are seen at [facility] by appointment only. To make a medical or registration appointment, please call the clinic at [phone] between the hours of 9:00AM-12:00PM and 1:00PM-5:00PM, Monday through Thursday.

## 2018-03-08 NOTE — MR AVS SNAPSHOT
[address] [place] [address] 
[phone] Patient: Lizy Vo MRN: [id number] Visit Information Date & Time Provider Department Dept. Phone Encounter #  
 3/8/2018  8:30 AM Deepa Blanco, [address] [place] [address] and Internal Medicine [phone] Follow-up Instructions Return in about 2 weeks (around 3/22/2018) for Blood Pressure follow-up. Upcoming Health Maintenance Date Due Hepatitis C Screening 1955 DTaP/Tdap/Td series (1 - Tdap) 3/7/1978 BREAST CANCER SCRN MAMMOGRAM 2014 ZOSTER VACCINE AGE 60> 2017 Influenza Age 5 to Adult 2017 PAP AKA CERVICAL CYTOLOGY 2019 COLONOSCOPY 2019 Allergies as of 3/8/2018  Review Complete On: 3/8/2018 By: Deepa Blanco MD  
  
 Severity Noted Reaction Type Reactions Naprosyn [Naproxen]  2016   Intolerance Other (comments) Hallucinations. Tolerates Ibuprofen without problems. Current Immunizations  Reviewed on 3/2/2018 Name Date Influenza Vaccine (Quad) PF 2016 Not reviewed this visit You Were Diagnosed With   
  
 Codes Comments Essential hypertension with goal blood pressure less than 130/80     ICD-10-CM: I10 
ICD-9-CM: 401.9 Vitals BP Pulse Temp Resp Height(growth percentile) Weight(growth percentile) (!) 179/94 (BP 1 Location: Right arm, BP Patient Position: Sitting) 78 98 °F (36.7 °C) (Oral) 16 5' 4\" (1.626 m) 200 lb (90.7 kg) SpO2 BMI OB Status Smoking Status 97% 34.33 kg/m2 Postmenopausal Never Smoker BMI and BSA Data Body Mass Index Body Surface Area  
 34.33 kg/m 2 2.02 m 2 Preferred Pharmacy Pharmacy Name Phone [facility] [address], [facility] [address] [phone] Your Updated Medication List  
  
   
 This list is accurate as of 3/8/18  9:43 AM.  Always use your most recent med list. amLODIPine 10 mg tablet Commonly known as:  Larayne Alcides Take 1 Tab by mouth daily. aspirin 325 mg tablet Commonly known as:  ASPIRIN Take 1 Tab by mouth daily. cetirizine 10 mg tablet Commonly known as:  ZYRTEC Take 1 Tab by mouth nightly. lisinopril 10 mg tablet Commonly known as:  Queen Daruis Take 2 Tabs by mouth daily. Take daily with 10mg amlodipine tablet as reviewed. Follow-up Instructions Return in about 2 weeks (around 3/22/2018) for Blood Pressure follow-up. Patient Instructions 1. Please call the Advanced Patient Advocacy number on the card to determine what options are available and if you qualify for [facility]. 2.  Please call Timothy Galvan LPN Panel manager here--she will review other options for medication coverage while you are clarifying the insurance/Care Card coverage as above. 3.  You can see in the interim, if other pharmacies offer generic costs for the two blood pressure meds you are taking. 4.  Until the 10mg lisinopril tabs are gone take 2 daily. You can take both with your morning Amlodipine 10mg dose. Once you have completed current prescription, we can refill as a single 20mg tab as reviewed. 5.  Free clinics in [facility] include: 
 
[facility] [address] [facility] [address]  
[phone] [facility] Address: [address] [place] [address], [place], [address] [facility] [address] Phone: [phone], ext. [phone] Clinic Hours Monday through Thursday: 8:30 am  5 pm  
Friday: 8:30 am  3 pm 
On the first Friday of the month, both clinics are open 8:30 am  12:30 pm 
 
 
[facility] [place] 
Address: [address] [facility] [place] [address], [address] [place] [address] Across from [facility]; enter at the back of the building. Phone: [phone] Clinic Hours Monday, Wednesday, Thursday: 8:30 am  5 pm  
 Tuesday: 8:30 am  8 pm  
Friday: 8:30 am  3 pm 
On the first Friday of the month, both clinics are open 8:30 am  12:30 pm 
 
 
[address] [place] Patients are seen at [facility] by appointment only. To make a medical or registration appointment, please call the clinic at [phone] between the hours of 9:00AM-12:00PM and 1:00PM-5:00PM, Monday through Thursday. Introducing [facility] SERVICES! Dear Aristeo Her: 
Thank you for requesting a [employer] account. Our records indicate that you already have an active Unsubscribe[employer]com account. You can access your account anytime at [email or web address]/[email or web address] Did you know that you can access your hospital and ER discharge instructions at any time in Unsubscribe[employer]com? You can also review all of your test results from your hospital stay or ER visit. Additional Information If you have questions, please visit the Frequently Asked Questions section of the [email or web address] website at [email or web address] Remember, Unsubscribe[employer]com is NOT to be used for urgent needs. For medical emergencies, dial 911. Now available from your iPhone and Android! Please provide this summary of care documentation to your next provider. Your primary care clinician is listed as [address]. If you have any questions after today's visit, please call [phone].

## 2018-04-12 ENCOUNTER — PATIENT OUTREACH (OUTPATIENT)
Dept: INTERNAL MEDICINE CLINIC | Age: 63
End: 2018-04-12

## 2018-04-12 NOTE — PROGRESS NOTES
Outgoing call made to patient. Left message to return call. Calling to follow up with resources for Access Now.

## 2018-06-05 DIAGNOSIS — I10 ESSENTIAL HYPERTENSION WITH GOAL BLOOD PRESSURE LESS THAN 130/80: ICD-10-CM

## 2018-06-05 NOTE — TELEPHONE ENCOUNTER
----- Message from Banter! Factor sent at 6/5/2018  9:35 AM EDT -----  Regarding: /Telephone  [facility] pharmacy contacted the pt stating they are waiting on an authorization for Rx Lisinopril, pt will like a call back in reference to this request.  P [phone]

## 2018-06-05 NOTE — TELEPHONE ENCOUNTER
Medication refill request:    Last Office Visit:3/8/18  Next Office Visit:  No future appointments. Pharmacy verified Yes pt use's the [facility] at [place] [address] there # [phone]    Patient requesting 90 day supply.

## 2018-06-07 NOTE — TELEPHONE ENCOUNTER
Dose increased March 2018 visit. If pt taking regularly at this dose, can refill as single 20mg tab if preferred, for same lisinopril dose of 20mg daily.

## 2018-06-15 NOTE — TELEPHONE ENCOUNTER
Patient returned nurse's call. She stated that she is currently still taking 20mg as suggested at her last office visit. She would like to know if this can be called in asap.

## 2018-06-19 RX ORDER — LISINOPRIL 20 MG/1
20 TABLET ORAL DAILY
Qty: 90 TAB | Refills: 1 | Status: SHIPPED | OUTPATIENT
Start: 2018-06-19 | End: 2019-01-11 | Stop reason: SDUPTHER

## 2018-09-13 ENCOUNTER — HOSPITAL ENCOUNTER (EMERGENCY)
Age: 63
Discharge: HOME | End: 2018-09-13
Attending: EMERGENCY MEDICINE
Payer: SELF-PAY

## 2018-09-13 VITALS
HEIGHT: 64 IN | SYSTOLIC BLOOD PRESSURE: 168 MMHG | BODY MASS INDEX: 34.51 KG/M2 | TEMPERATURE: 98.2 F | OXYGEN SATURATION: 100 % | HEART RATE: 77 BPM | RESPIRATION RATE: 18 BRPM | WEIGHT: 202.16 LBS | DIASTOLIC BLOOD PRESSURE: 106 MMHG

## 2018-09-13 DIAGNOSIS — K02.9 PAIN DUE TO DENTAL CARIES: Primary | ICD-10-CM

## 2018-09-13 PROCEDURE — 99282 EMERGENCY DEPT VISIT SF MDM: CPT

## 2018-09-13 RX ORDER — IBUPROFEN 800 MG/1
800 TABLET ORAL
Qty: 20 TAB | Refills: 0 | Status: SHIPPED | OUTPATIENT
Start: 2018-09-13 | End: 2018-09-20

## 2018-09-13 RX ORDER — HYDROCODONE BITARTRATE AND ACETAMINOPHEN 5; 325 MG/1; MG/1
1 TABLET ORAL
Qty: 10 TAB | Refills: 0 | Status: SHIPPED | OUTPATIENT
Start: 2018-09-13 | End: 2019-01-11

## 2018-09-13 RX ORDER — AMOXICILLIN 500 MG/1
500 TABLET, FILM COATED ORAL 3 TIMES DAILY
Qty: 30 TAB | Refills: 0 | Status: SHIPPED | OUTPATIENT
Start: 2018-09-13 | End: 2019-01-11

## 2018-09-13 NOTE — ED NOTES
Dc instructions per Juan Dueñas, :PA  All questions and concerns addressed  Ambulated out of ER without any difficulty.

## 2018-09-13 NOTE — ED PROVIDER NOTES
EMERGENCY DEPARTMENT HISTORY AND PHYSICAL EXAM      Date: 9/13/2018  Patient Name: Acosta Moreau    History of Presenting Illness     Chief Complaint   Patient presents with    Dental Pain     R dental pain       History Provided By: Patient    HPI: Acosta Moreau, 66 y.o. female presents ambulatory to the ED with cc of 4 days of 7 out of 10 constant aching right upper dental pain that is not much better with OTC pain medication and associate with a dental fracture and poor fitting dentures. She has not been to the dentist in a while. No fever. Chief Complaint: dental pain  Duration: 4 Days  Timing:  Constant  Location: right upper teeth  Quality: Aching  Severity: 7 out of 10  Modifying Factors: not much better with OTC pain medication  Associated Symptoms: facial swelling    There are no other complaints, changes, or physical findings at this time. PCP: Nanette Wolf MD    Current Outpatient Prescriptions   Medication Sig Dispense Refill    HYDROcodone-acetaminophen (NORCO) 5-325 mg per tablet Take 1 Tab by mouth every four (4) hours as needed for Pain. Max Daily Amount: 6 Tabs. 10 Tab 0    ibuprofen (MOTRIN) 800 mg tablet Take 1 Tab by mouth every eight (8) hours as needed for Pain for up to 7 days. 20 Tab 0    amoxicillin 500 mg tab Take 500 mg by mouth three (3) times daily. 30 Tab 0    amLODIPine (NORVASC) 10 mg tablet Take 1 Tab by mouth daily. 30 Tab 0    lisinopril (PRINIVIL, ZESTRIL) 20 mg tablet Take 1 Tab by mouth daily. Take daily with 10mg amlodipine tablet as reviewed. 90 Tab 1    aspirin (ASPIRIN) 325 mg tablet Take 1 Tab by mouth daily. 30 Tab 0    cetirizine (ZYRTEC) 10 mg tablet Take 1 Tab by mouth nightly. 30 Tab 5     Past History     Past Medical History:  Past Medical History:   Diagnosis Date    Hepatic steatosis 2013    CT imaging.  Hypertension     Pap smear for cervical cancer screening 07/2016    Normal with negative HPV--repeat 2yr with gyn.        Past Surgical History:  History reviewed. No pertinent surgical history. Family History:  Family History   Problem Relation Age of Onset    Heart Disease Father     Hypertension Father     Glaucoma Father     Other Mother      ulcers       Social History:  Social History   Substance Use Topics    Smoking status: Never Smoker    Smokeless tobacco: Never Used    Alcohol use 0.0 oz/week     0 Standard drinks or equivalent per week      Comment: occasionally       Allergies: Allergies   Allergen Reactions    Naprosyn [Naproxen] Other (comments)     Hallucinations. Tolerates Ibuprofen without problems. Review of Systems   Review of Systems   Constitutional: Negative for fatigue and fever. HENT: Positive for dental problem. Negative for ear pain and sore throat. Eyes: Negative for pain, redness and visual disturbance. Respiratory: Negative for cough and shortness of breath. Cardiovascular: Negative for chest pain and palpitations. Gastrointestinal: Negative for abdominal pain, nausea and vomiting. Genitourinary: Negative for dysuria, frequency and urgency. Musculoskeletal: Negative for back pain, gait problem, neck pain and neck stiffness. Skin: Negative for rash and wound. Neurological: Negative for dizziness, weakness, light-headedness, numbness and headaches. Physical Exam   Physical Exam   Constitutional: She is oriented to person, place, and time. She appears well-developed and well-nourished. Non-toxic appearance. No distress. HENT:   Head: Normocephalic and atraumatic. Right Ear: External ear normal.   Left Ear: External ear normal.   Nose: Nose normal.   Mouth/Throat: Uvula is midline. No trismus in the jaw. Mild right facial swelling  No trismus  Essentially edentulous  Decayed remnants of right upper 1st premolar  No visible abscess to incise   Eyes: Conjunctivae and EOM are normal. Pupils are equal, round, and reactive to light. No scleral icterus.    Neck: Normal range of motion and full passive range of motion without pain. Cardiovascular: Normal rate and regular rhythm. Pulmonary/Chest: Effort normal. No accessory muscle usage. No tachypnea. No respiratory distress. She has no decreased breath sounds. She has no wheezes. Abdominal: Soft. There is no tenderness. Musculoskeletal: Normal range of motion. Neurological: She is alert and oriented to person, place, and time. She is not disoriented. No cranial nerve deficit. GCS eye subscore is 4. GCS verbal subscore is 5. GCS motor subscore is 6. Skin: Skin is intact. No rash noted. Psychiatric: She has a normal mood and affect. Her speech is normal.   Nursing note and vitals reviewed. Diagnostic Study Results     Labs -   No results found for this or any previous visit (from the past 12 hour(s)). Radiologic Studies -   No orders to display     CT Results  (Last 48 hours)    None        CXR Results  (Last 48 hours)    None        Medical Decision Making   I am the first provider for this patient. I reviewed the vital signs, available nursing notes, past medical history, past surgical history, family history and social history. Vital Signs-Reviewed the patient's vital signs. Patient Vitals for the past 12 hrs:   Temp Pulse Resp BP SpO2   09/13/18 1147 98.2 °F (36.8 °C) 77 18 (!) 168/106 100 %       Pulse Oximetry Analysis - 100% on RA    Records Reviewed: Nursing Notes, Old Medical Records and     Provider Notes (Medical Decision Making):   DDx: dental caries, dental fracture, dental abscess, poor fitting dentures    ED Course:   Initial assessment performed. The patients presenting problems have been discussed, and they are in agreement with the care plan formulated and outlined with them. I have encouraged them to ask questions as they arise throughout their visit. Disposition:  Discharge    PLAN:  1.    Current Discharge Medication List      START taking these medications    Details HYDROcodone-acetaminophen (NORCO) 5-325 mg per tablet Take 1 Tab by mouth every four (4) hours as needed for Pain. Max Daily Amount: 6 Tabs. Qty: 10 Tab, Refills: 0    Associated Diagnoses: Pain due to dental caries      ibuprofen (MOTRIN) 800 mg tablet Take 1 Tab by mouth every eight (8) hours as needed for Pain for up to 7 days. Qty: 20 Tab, Refills: 0      amoxicillin 500 mg tab Take 500 mg by mouth three (3) times daily. Qty: 30 Tab, Refills: 0           2. Follow-up Information     Follow up With Details Comments Contact Info    [facility] Schedule an appointment as soon as possible for a visit DENTAL SERVICES: call to schedule follow up [address]  [phone]        Return to ED if worse     Diagnosis     Clinical Impression:   1.  Pain due to dental caries

## 2018-09-13 NOTE — DISCHARGE INSTRUCTIONS
Emergency [address] [facility]  [address], [address]  Open M, W, F: 8AM - 5PM and T, Th: 8AM-6PM  Phone: [phone], press 4  $70 for Emergency Care  $60 for first routine care, then pay by sliding scale based upon income. [facility]  [address] [place], [address]  Phone: [phone]    [facility] [employer]  [address] [facility], [address]  Open Monday - Friday 8AM - 4:30 PM  Phone: [address] [place] [address] Dentistry Urgent [address] [place] [address] Dentistry, [address] [place] [address], [place] [address], [address] [place] [address] starting at 8:30 AM M-F  Phone: [phone], press 2  Fee: $150 per tooth (x-ray & extractions only)  Pediatrics Phone[de-identified] [phone], 8-5 M-F    [address] Dentistry, [address] [place] [address], 2nd Floor, [address] [place] [address] starting at 8:30 AM - 3 PM [facility] [address]  [address] [facility], [address] [place] [address]  Phone: [phone] or [phone]  Emergency Hours: 9:30AM - 11AM (extractions)  Simple tooth extraction $ per tooth. #75 for x-ray    [facility] Residents only, over the age of 25  Phone: [phone]. Leave message saying you need an appointment to register.   Hours: Tuesday Evenings

## 2018-09-13 NOTE — ED TRIAGE NOTES
Pt states had tooth break off on Sunday. States pain began on Monday. States swelling started Tuesday and worse yesterday. States does not have a dentist.  States wears dentures and the tooth that broke is an anchor tooth.

## 2018-11-03 ENCOUNTER — HOSPITAL ENCOUNTER (EMERGENCY)
Age: 63
Discharge: HOME | End: 2018-11-03
Attending: EMERGENCY MEDICINE
Payer: SELF-PAY

## 2018-11-03 VITALS
TEMPERATURE: 98 F | WEIGHT: 201.28 LBS | OXYGEN SATURATION: 100 % | RESPIRATION RATE: 18 BRPM | SYSTOLIC BLOOD PRESSURE: 166 MMHG | DIASTOLIC BLOOD PRESSURE: 78 MMHG | BODY MASS INDEX: 34.36 KG/M2 | HEART RATE: 77 BPM | HEIGHT: 64 IN

## 2018-11-03 DIAGNOSIS — K02.9 DENTAL CARIES: ICD-10-CM

## 2018-11-03 DIAGNOSIS — K08.89 PAIN, DENTAL: Primary | ICD-10-CM

## 2018-11-03 DIAGNOSIS — R22.0 FACIAL SWELLING: ICD-10-CM

## 2018-11-03 PROCEDURE — 99284 EMERGENCY DEPT VISIT MOD MDM: CPT

## 2018-11-03 PROCEDURE — 74011250637 HC RX REV CODE- 250/637: Performed by: EMERGENCY MEDICINE

## 2018-11-03 RX ORDER — OXYCODONE AND ACETAMINOPHEN 5; 325 MG/1; MG/1
1 TABLET ORAL
Qty: 10 TAB | Refills: 0 | Status: SHIPPED | OUTPATIENT
Start: 2018-11-03 | End: 2019-01-11

## 2018-11-03 RX ORDER — CLINDAMYCIN HYDROCHLORIDE 300 MG/1
300 CAPSULE ORAL 4 TIMES DAILY
Qty: 28 CAP | Refills: 0 | Status: SHIPPED | OUTPATIENT
Start: 2018-11-03 | End: 2018-11-10

## 2018-11-03 RX ORDER — HYDROCODONE BITARTRATE AND ACETAMINOPHEN 10; 325 MG/1; MG/1
1 TABLET ORAL
Status: COMPLETED | OUTPATIENT
Start: 2018-11-03 | End: 2018-11-03

## 2018-11-03 RX ORDER — CLINDAMYCIN HYDROCHLORIDE 150 MG/1
300 CAPSULE ORAL
Status: COMPLETED | OUTPATIENT
Start: 2018-11-03 | End: 2018-11-03

## 2018-11-03 RX ADMIN — HYDROCODONE BITARTRATE AND ACETAMINOPHEN 1 TABLET: 10; 325 TABLET ORAL at 20:15

## 2018-11-03 RX ADMIN — CLINDAMYCIN HYDROCHLORIDE 300 MG: 150 CAPSULE ORAL at 20:15

## 2018-11-04 NOTE — ED PROVIDER NOTES
EMERGENCY DEPARTMENT HISTORY AND PHYSICAL EXAM      Date: 11/3/2018  Patient Name: Alexander Salazar    History of Presenting Illness     Chief Complaint   Patient presents with    Mouth Swelling     Pt ambulatory to triage with c/o mouth, tongue swelling to L side x 2 weeks with dental pain to lower left side; pt state going to dentist Tuesday and was rx'd ABX (Amox) to clear up infection before extraction; pt denies trouble breathing, able to speak in full sentences, denies throat swelling       History Provided By: Patient    HPI: Alexander Salazar, 66 y.o. female with PMHx significant for HTN, presents ambulatory to the ED with cc of a dental infection to the lower left side of the mouth with associated left sided facial swelling x 2 weeks. The pt reports that she went to the dentist 4 days ago and was rx'd amoxicillin to clear up infection before her extraction this coming week. She notes that 3 days ago her lip began to feel numb after each dose of amoxicillin. Pt denies any tongue swelling, throat swelling, SOB, or trouble swallowing. She denies taking any OTC medications at home for her current pain. The pt specifically denies any fever, chills, voice changes, trouble swallowing, SOB, CP, HA, N/V/D, abdominal pain, dysuria, hematuria, or rash. There are no other complaints, changes, or physical findings at this time. Social Hx: + EtOH (occasional); - Smoker; - Illicit Drugs    PCP: Moira Iglesias MD    Current Outpatient Medications   Medication Sig Dispense Refill    clindamycin (CLEOCIN) 300 mg capsule Take 1 Cap by mouth four (4) times daily for 7 days. 28 Cap 0    oxyCODONE-acetaminophen (PERCOCET) 5-325 mg per tablet Take 1 Tab by mouth every four (4) hours as needed for Pain. Max Daily Amount: 6 Tabs. 10 Tab 0    HYDROcodone-acetaminophen (NORCO) 5-325 mg per tablet Take 1 Tab by mouth every four (4) hours as needed for Pain. Max Daily Amount: 6 Tabs.  10 Tab 0    amoxicillin 500 mg tab Take 500 mg by mouth three (3) times daily. 30 Tab 0    amLODIPine (NORVASC) 10 mg tablet Take 1 Tab by mouth daily. 30 Tab 0    lisinopril (PRINIVIL, ZESTRIL) 20 mg tablet Take 1 Tab by mouth daily. Take daily with 10mg amlodipine tablet as reviewed. 90 Tab 1    aspirin (ASPIRIN) 325 mg tablet Take 1 Tab by mouth daily. 30 Tab 0    cetirizine (ZYRTEC) 10 mg tablet Take 1 Tab by mouth nightly. 30 Tab 5       Past History     Past Medical History:  Past Medical History:   Diagnosis Date    Hepatic steatosis 2013    CT imaging.  Hypertension     Pap smear for cervical cancer screening 07/2016    Normal with negative HPV--repeat 2yr with gyn. Past Surgical History:  No past surgical history on file. Family History:  Family History   Problem Relation Age of Onset    Heart Disease Father     Hypertension Father     Glaucoma Father     Other Mother         ulcers       Social History:  Social History     Tobacco Use    Smoking status: Never Smoker    Smokeless tobacco: Never Used   Substance Use Topics    Alcohol use: Yes     Alcohol/week: 0.0 oz     Comment: occasionally    Drug use: No       Allergies: Allergies   Allergen Reactions    Naprosyn [Naproxen] Other (comments)     Hallucinations. Tolerates Ibuprofen without problems. Review of Systems   Review of Systems   Constitutional: Negative for fatigue and fever. HENT: Positive for dental problem and facial swelling (left side). Negative for trouble swallowing and voice change. Eyes: Negative. Respiratory: Negative for shortness of breath and wheezing. Cardiovascular: Negative for chest pain and leg swelling. Gastrointestinal: Negative for blood in stool, constipation, diarrhea, nausea and vomiting. Endocrine: Negative. Genitourinary: Negative for difficulty urinating and dysuria. Musculoskeletal: Negative. Skin: Negative for rash. Allergic/Immunologic: Negative.     Neurological: Negative for weakness and numbness. Hematological: Negative. Psychiatric/Behavioral: Negative. Physical Exam   Physical Exam   HENT:   Mouth/Throat: Oropharynx is clear and moist and mucous membranes are normal. No trismus in the jaw. No sublingual swelling. No stridor. Tolerating secretions. Left lower gingival erythema and swelling. No angioedema, tongue swelling, or uvular swelling. Pulmonary/Chest: No respiratory distress. Diagnostic Study Results     Labs -   No results found for this or any previous visit (from the past 12 hour(s)). Radiologic Studies -   No orders to display     CT Results  (Last 48 hours)    None        CXR Results  (Last 48 hours)    None            Medical Decision Making   I am the first provider for this patient. I reviewed the vital signs, available nursing notes, past medical history, past surgical history, family history and social history. Vital Signs-Reviewed the patient's vital signs. Patient Vitals for the past 12 hrs:   Temp Pulse Resp BP SpO2   11/03/18 1928 98.7 °F (37.1 °C) 73 16 (!) 203/97 100 %       Pulse Oximetry Analysis - 100% on RA    Cardiac Monitor:   Rate: 73 bpm  Rhythm: Normal Sinus Rhythm      Records Reviewed: Nursing Notes and Old Medical Records    Provider Notes (Medical Decision Making):   DDx: dental caries, gingivitis, dental abscess. No s/s concerning for angioedema--lower suspicion for allergic reaction, facial swelling more likely secondary to dental infection and inflammation. Pt with no signs and sx's of Barber's angina. Airways intact. Will tx symptomatically. ED Course:   Initial assessment performed. The patients presenting problems have been discussed, and they are in agreement with the care plan formulated and outlined with them. I have encouraged them to ask questions as they arise throughout their visit.          Critical Care Time:   0    Disposition:  Discharge Note:  8:15 PM  The patient has been re-evaluated and is ready for discharge. Reviewed available results with patient. Counseled patient/parent/guardian on diagnosis and care plan. Patient has expressed understanding, and all questions have been answered. Patient agrees with plan and agrees to follow up as recommended, or return to the ED if their symptoms worsen. Discharge instructions have been provided and explained to the patient, along with reasons to return to the ED. PLAN:  1. Current Discharge Medication List      START taking these medications    Details   clindamycin (CLEOCIN) 300 mg capsule Take 1 Cap by mouth four (4) times daily for 7 days. Qty: 28 Cap, Refills: 0      oxyCODONE-acetaminophen (PERCOCET) 5-325 mg per tablet Take 1 Tab by mouth every four (4) hours as needed for Pain. Max Daily Amount: 6 Tabs. Qty: 10 Tab, Refills: 0    Associated Diagnoses: Pain, dental; Facial swelling           2. Follow-up Information     Follow up With Specialties Details Why Contact Info    Your dentist  Go on 11/6/2018      [facility] [place] EM[facility] DE[facility] Emergency Medicine  As needed, If symptoms worsen [address] [place] [address]  [phone]        Return to ED if worse     Diagnosis     Clinical Impression:   1. Pain, dental    2. Facial swelling    3. Dental caries        Attestations: This note is prepared by Ariadna Conley, acting as Scribe for [employer], DO    [employer] DO: The scribe's documentation has been prepared under my direction and personally reviewed by me in its entirety. I confirm that the note above accurately reflects all work, treatment, procedures, and medical decision making performed by me.

## 2018-11-04 NOTE — DISCHARGE INSTRUCTIONS
Emergency [address] by DYLAN MILLER [facility]  [address] [facility], [address]  Open M, W, F: 8AM - 5PM and T, Th: 8AM-6PM  Phone: [phone], press 4  $70 for Emergency Care  $60 for first routine care, then pay by sliding scale based upon income. [facility]  [address] [place], [address]  Phone: [phone]    [facility]  [address] [facility], [address]  Open Monday - Friday 8AM - 4:30 PM  Phone: [address] [place] [address] Dentistry Urgent [address] [place] [address] Dentistry, [address] [place] [address], [place] [address], [address] [place] [address] starting at 8:30 AM M-F  Phone: [phone], press 2  Fee: $150 per tooth (x-ray & extractions only)  Pediatrics Phone[de-identified] [phone], 8-5 M-F    [address] Dentistry, [address] [facility], [place] [address], 2nd Floor, [address] [place] [address] starting at 8:30 AM - 3 PM [address] [place] [address] St  [address] [facility], [address] [place] [address]  Phone: [phone] or [phone]  Emergency Hours: 9:30AM - 11AM (extractions)  Simple tooth extraction $ per tooth. #75 for x-ray    [facility] Residents only, over the age of 25  Phone: [phone]. Leave message saying you need an appointment to register. Hours: Tuesday Evenings       Tooth Decay: Care Instructions  Your Care Instructions    Tooth decay is damage to a tooth caused by plaque. Plaque is a thin film of bacteria that sticks to the teeth above and below the gum line. If plaque isn't removed from the teeth, it can build up and harden into tartar. The bacteria in plaque and tartar use sugars in food to make acids. These acids can cause tooth decay and gum disease. Any part of your tooth can decay, from the roots below the gum line to the chewing surface.  Decay can affect the outer layer (enamel) or inner layer (dentin) of your teeth. The deeper the decay, the worse the damage. Untreated tooth decay will get worse and may lead to tooth loss. If you have a small hole (cavity) in your tooth, your dentist can repair it by removing the decay and filling the hole. If you have deeper decay, you may need more treatment. A very badly damaged tooth may have to be removed. Follow-up care is a key part of your treatment and safety. Be sure to make and go to all appointments, and call your dentist if you are having problems. It's also a good idea to know your test results and keep a list of the medicines you take. How can you care for yourself at home? If you have pain:  · Take an over-the-counter pain medicine, such as acetaminophen (Tylenol), ibuprofen (Advil, Motrin), or naproxen (Aleve). Be safe with medicines. Read and follow all instructions on the label. ? Do not take two or more pain medicines at the same time unless the doctor told you to. Many pain medicines have acetaminophen, which is Tylenol. Too much acetaminophen (Tylenol) can be harmful. · Put ice or a cold pack on your cheek over the tooth for 10 to 15 minutes at a time. Put a thin cloth between the ice and your skin. To prevent tooth decay  · Brush teeth twice a day, and floss once a day. Brushing with fluoride toothpaste and flossing may be enough to reverse early decay. · Use a toothbrush with soft, rounded-end bristles and a head that is small enough to reach all parts of your teeth and mouth. Replace your toothbrush every 3 or 4 months. You may also use an electric toothbrush that has rotating and oscillating (back-and-forth) action. · Ask your dentist about having fluoride treatments at the dental office. · Brush your tongue to help get rid of bacteria. · Eat healthy foods that include whole grains, vegetables, and fruits. · Have your teeth cleaned by a professional at least two times a year.   · Do not smoke or use smokeless tobacco. Tobacco can make tooth decay worse. When should you call for help? Call 911 anytime you think you may need emergency care. For example, call if:    · You have trouble breathing.    Call your dentist now or seek immediate medical care if:    · You have new or worse symptoms of infection, such as:  ? Increased pain, swelling, warmth, or redness. ? Red streaks leading from the area. ? Pus draining from the area. ? A fever.    Watch closely for changes in your health, and be sure to contact your doctor if:    · You do not get better as expected. Where can you learn more? Go to http://patriceSegwayalia.info/. Enter U574 in the search box to learn more about \"Tooth Decay: Care Instructions. \"  Current as of: March 28, 2018  Content Version: 11.8  © 9250-8523 [employer]. Care instructions adapted under license by [employer] (which disclaims liability or warranty for this information). If you have questions about a medical condition or this instruction, always ask your healthcare professional. [place] [address] any warranty or liability for your use of this information. Tooth and Gum Pain: Care Instructions  Your Care Instructions    The most common causes of dental pain are tooth decay and gum disease. Pain can also be caused by an infection of the tooth (abscess) or the gums. Or you may have pain from a broken or cracked tooth. Other causes of pain include infection and damage to a tooth from nervous grinding of your teeth. A wisdom tooth can be painful when it is coming in but cannot break through the gum. It can also be painful when the tooth is only partway in and extra gum tissue has formed around it. The tissue can get inflamed (pericoronitis), and sometimes it gets infected. Prompt dental care can help find the cause of your toothache and keep the tooth from dying or gum disease from getting worse.  Self-care at home may reduce your pain and discomfort. Follow-up care is a key part of your treatment and safety. Be sure to make and go to all appointments, and call your dentist or doctor if you are having problems. It's also a good idea to know your test results and keep a list of the medicines you take. How can you care for yourself at home? · To reduce pain and facial swelling, put an ice or cold pack on the outside of your cheek for 10 to 20 minutes at a time. Put a thin cloth between the ice and your skin. Do not use heat. · If your doctor prescribed antibiotics, take them as directed. Do not stop taking them just because you feel better. You need to take the full course of antibiotics. · Ask your doctor if you can take an over-the-counter pain medicine, such as acetaminophen (Tylenol), ibuprofen (Advil, Motrin), or naproxen (Aleve). Be safe with medicines. Read and follow all instructions on the label. · Avoid very hot, cold, or sweet foods and drinks if they increase your pain. · Rinse your mouth with warm salt water every 2 hours to help relieve pain and swelling. Mix 1 teaspoon of salt in 8 ounces of water. · Talk to your dentist about using special toothpaste for sensitive teeth. To reduce pain on contact with heat or cold or when brushing, brush with this toothpaste regularly or rub a small amount of the paste on the sensitive area with a clean finger 2 or 3 times a day. Floss gently between your teeth. · Do not smoke or use spit tobacco. Tobacco use can make gum problems worse, decreases your ability to fight infection in your gums, and delays healing. If you need help quitting, talk to your doctor about stop-smoking programs and medicines. These can increase your chances of quitting for good. When should you call for help? Call 911 anytime you think you may need emergency care.  For example, call if:    · You have trouble breathing.    Call your dentist or doctor now or seek immediate medical care if:    · You have signs of infection, such as:  ? Increased pain, swelling, warmth, or redness. ? Red streaks leading from the area. ? Pus draining from the area. ? A fever.    Watch closely for changes in your health, and be sure to contact your doctor if:    · You do not get better as expected. Where can you learn more? Go to http://patrice-alia.info/. Enter 0363 [id number] in the search box to learn more about \"Tooth and Gum Pain: Care Instructions. \"  Current as of: March 28, 2018  Content Version: 11.8  © 8911-5968 [employer]. Care instructions adapted under license by [employer] (which disclaims liability or warranty for this information). If you have questions about a medical condition or this instruction, always ask your healthcare professional. [address] any warranty or liability for your use of this information.

## 2019-01-11 ENCOUNTER — OFFICE VISIT (OUTPATIENT)
Dept: INTERNAL MEDICINE CLINIC | Age: 64
End: 2019-01-11

## 2019-01-11 VITALS
TEMPERATURE: 98.2 F | SYSTOLIC BLOOD PRESSURE: 188 MMHG | DIASTOLIC BLOOD PRESSURE: 96 MMHG | HEART RATE: 85 BPM | HEIGHT: 64 IN | OXYGEN SATURATION: 97 % | BODY MASS INDEX: 33.87 KG/M2 | RESPIRATION RATE: 18 BRPM | WEIGHT: 198.38 LBS

## 2019-01-11 DIAGNOSIS — Z83.511 FH: GLAUCOMA: ICD-10-CM

## 2019-01-11 DIAGNOSIS — I10 ESSENTIAL HYPERTENSION WITH GOAL BLOOD PRESSURE LESS THAN 130/80: Primary | ICD-10-CM

## 2019-01-11 RX ORDER — LISINOPRIL 20 MG/1
20 TABLET ORAL DAILY
Qty: 90 TAB | Refills: 1 | Status: SHIPPED | OUTPATIENT
Start: 2019-01-11 | End: 2019-07-05 | Stop reason: SDUPTHER

## 2019-01-11 RX ORDER — AMLODIPINE BESYLATE 10 MG/1
10 TABLET ORAL DAILY
Qty: 90 TAB | Refills: 1 | Status: SHIPPED | OUTPATIENT
Start: 2019-01-11 | End: 2019-07-05 | Stop reason: SDUPTHER

## 2019-01-11 NOTE — PROGRESS NOTES
History of Present Illness:   Cat Bardales is a 66 y.o. female here for evaluation:    Chief Complaint   Patient presents with    Medication Evaluation     Patient request refill on Amlodipine and Lisinopril. Here for refills. Last visit March 2018. Last refill Sept 2018--she had indicated would schedule appt then, but did not. She had call from Panel Mgr after last visit to help with prescription/care costs, but unable to reach. She notes not at [facility] both are free--at least one for 90-day supply. She prefers to continue with care here. She does not have insurance. Reviewed questions about insurance, CareCard, Access Now. She notes FH glaucoma and needs to have eye exam--dad has glaucoma. Reviewed access to ophth through insurance and Care Card and that there are no ophthalmology/eye care providers through [facility]. Reviewed [facility] options for care if needed also. She will get information at check-out and scheduling today. Screenings done with nursing reviewed by provider at visit. Prior to Admission medications    Medication Sig Start Date End Date Taking? Authorizing Provider   amLODIPine (NORVASC) 10 mg tablet Take 1 Tab by mouth daily. 9/9/18  Yes Felisha Chang MD   lisinopril (PRINIVIL, ZESTRIL) 20 mg tablet Take 1 Tab by mouth daily. Take daily with 10mg amlodipine tablet as reviewed. 6/19/18  Yes Felisha Chang MD   aspirin (ASPIRIN) 325 mg tablet Take 1 Tab by mouth daily. 3/2/18  Yes Stella Suggs MD   cetirizine (ZYRTEC) 10 mg tablet Take 1 Tab by mouth nightly. 8/22/16   Felisha Chang MD        ROS    Vitals:    01/11/19 1006   BP: (!) 188/96   Pulse: 85   Resp: 18   Temp: 98.2 °F (36.8 °C)   TempSrc: Oral   SpO2: 97%   Weight: 198 lb 6 oz (90 kg)   Height: 5' 4\" (1.626 m)   PainSc:   0 - No pain     Repeat BP at end of visit--not done. Body mass index is 34.05 kg/m².     Physical Exam:     Physical Exam Constitutional: She appears well-developed and well-nourished. No distress. HENT:   Head: Normocephalic and atraumatic. Eyes: Conjunctivae are normal. Right eye exhibits no discharge. Left eye exhibits no discharge. No scleral icterus. Neck: Neck supple. Cardiovascular: Normal rate, regular rhythm, normal heart sounds and intact distal pulses. Exam reveals no gallop and no friction rub. No murmur heard. Pulmonary/Chest: Effort normal and breath sounds normal. No respiratory distress. She has no wheezes. She has no rales. She exhibits no tenderness. Abdominal: Soft. Bowel sounds are normal. She exhibits no distension. There is no tenderness. Musculoskeletal: She exhibits no edema or tenderness. Neurological: She is alert. She exhibits normal muscle tone. Coordination normal.   Skin: Skin is warm. No rash noted. She is not diaphoretic. No erythema. No pallor. Psychiatric: She has a normal mood and affect. Her behavior is normal. Judgment and thought content normal.     Assessment and Plan:       ICD-10-CM ICD-9-CM    1. Essential hypertension with goal blood pressure less than 130/80 I10 401.9 amLODIPine (NORVASC) 10 mg tablet      lisinopril (PRINIVIL, ZESTRIL) 20 mg tablet   2. FH: glaucoma--father Z83.511 V19.11        1. Notes 90-day supplies covered with [facility] for free. Refills as above. Follow-up as below--pt notes can come back here in 2wks for BP monitoring. She notes intentional weight loss for BP:    Wt Readings from Last 3 Encounters:   01/11/19 198 lb 6 oz (90 kg)   11/03/18 201 lb 4.5 oz (91.3 kg)   09/13/18 202 lb 2.6 oz (91.7 kg)     She notes weight was lower--194 lbs) prior, and will continue to work on this to improve health and BP mgt. 2.  Screening/evaluation options reviewed currently and based on (future insurance) coverage. Follow-up next visit.       Follow-up Disposition:  Return in about 2 weeks (around 1/25/2019) for medication follow-up, Blood Pressure follow-up--2-4 weeks. lab results and schedule of future lab studies reviewed with patient  reviewed medications and side effects in detail    For additional documentation of information and/or recommendations discussed this visit, please see notes in instructions. Plan and evaluation (above) reviewed with pt at visit  Patient voiced understanding of plan and provided with time to ask/review questions. After Visit Summary (AVS) provided to pt after visit with additional instructions as needed/reviewed.

## 2019-01-11 NOTE — PROGRESS NOTES
RM 16    Patient unsure on getting flu vaccine, does not have insurance at this time. Eye exam last done 1/8/19. Chief Complaint   Patient presents with    Medication Evaluation     Patient request refill on Amlodipine and Lisinopril. 1. Have you been to the ER, urgent care clinic since your last visit? Hospitalized since your last visit? Yes Reason for visit: 9/13/18, 11/3/18 [facility], tooth ache    2. Have you seen or consulted any other health care providers outside of the [address] [place] [address] since your last visit? Include any pap smears or colon screening. No    Health Maintenance Due   Topic Date Due    Hepatitis C Screening  1955    DTaP/Tdap/Td series (1 - Tdap) 03/07/1978    Shingrix Vaccine Age 50> (1 of 2) 03/07/2007    BREAST CANCER SCRN MAMMOGRAM  06/19/2014    Influenza Age 5 to Adult  08/01/2018     PHQ over the last two weeks 1/11/2019   Little interest or pleasure in doing things Not at all   Feeling down, depressed, irritable, or hopeless Not at all   Total Score PHQ 2 0     Abuse Screening Questionnaire 1/11/2019   Do you ever feel afraid of your partner? N   Are you in a relationship with someone who physically or mentally threatens you? N   Is it safe for you to go home?  Y     Learning Assessment 1/11/2019   PRIMARY LEARNER Patient   HIGHEST LEVEL OF EDUCATION - PRIMARY LEARNER  -   BARRIERS PRIMARY LEARNER NONE   PRIMARY LANGUAGE ENGLISH   LEARNER PREFERENCE PRIMARY DEMONSTRATION     LISTENING     READING     VIDEOS   ANSWERED BY patient   RELATIONSHIP SELF

## 2019-05-22 ENCOUNTER — OFFICE VISIT (OUTPATIENT)
Dept: FAMILY MEDICINE CLINIC | Age: 64
End: 2019-05-22

## 2019-05-22 ENCOUNTER — HOSPITAL ENCOUNTER (OUTPATIENT)
Dept: LAB | Age: 64
Discharge: HOME | End: 2019-05-22

## 2019-05-22 VITALS
SYSTOLIC BLOOD PRESSURE: 170 MMHG | DIASTOLIC BLOOD PRESSURE: 89 MMHG | BODY MASS INDEX: 34.84 KG/M2 | HEART RATE: 82 BPM | TEMPERATURE: 98.4 F | WEIGHT: 203 LBS

## 2019-05-22 DIAGNOSIS — I10 ESSENTIAL HYPERTENSION: ICD-10-CM

## 2019-05-22 DIAGNOSIS — J30.1 SEASONAL ALLERGIC RHINITIS DUE TO POLLEN: ICD-10-CM

## 2019-05-22 DIAGNOSIS — I10 ESSENTIAL HYPERTENSION: Primary | ICD-10-CM

## 2019-05-22 PROCEDURE — 80053 COMPREHEN METABOLIC PANEL: CPT

## 2019-05-22 RX ORDER — HYDROCHLOROTHIAZIDE 12.5 MG/1
12.5 TABLET ORAL DAILY
Qty: 90 TAB | Refills: 0 | Status: SHIPPED | OUTPATIENT
Start: 2019-05-22 | End: 2019-07-10

## 2019-05-22 RX ORDER — BENZONATATE 200 MG/1
200 CAPSULE ORAL
Qty: 14 CAP | Refills: 1 | Status: SHIPPED | OUTPATIENT
Start: 2019-05-22 | End: 2019-05-29

## 2019-05-22 NOTE — PROGRESS NOTES
Coordination of Care  1. Have you been to the ER, urgent care clinic since your last visit? Hospitalized since your last visit? No    2. Have you seen or consulted any other health care providers outside of the [address] [place] [address] since your last visit? Include any pap smears or colon screening. No    Does the patient need refills? NO    Learning Assessment Complete?  yes

## 2019-05-22 NOTE — PROGRESS NOTES
Subjective:     Chief Complaint   Patient presents with    Cough     dry cough x 1 week      she is a 60y.o. year old female who presents for evalution. Reports a cough for the last week or so, dry, with no fever or sob. On further questioning, has had sensation of something in throat, with frequent throat-clearing, sore throat and post-nasal drainage with clear nasal d/c. Used to take zyrtec for allergies but hasn't for the last few years. Takes lisinopril and amlodipine for htn, stopped exercising and watching weight. Interested in restarting lifestyle changes. ROS-- stiff neck on left after 'sleeping wrong' a few weeks ago, continues to bother her a little. Past Medical History:   Diagnosis Date    Hepatic steatosis 2013    CT imaging.  Hypertension     Pap smear for cervical cancer screening 07/2016    Normal with negative HPV--repeat 2yr with gyn. Objective:     Vitals:    05/22/19 1116   BP: 170/89   Pulse: 82   Temp: 98.4 °F (36.9 °C)   TempSrc: Oral   Weight: 203 lb (92.1 kg)       Physical Examination: General appearance - alert, well appearing, and in no distress and overweight. Recheck bp by me 160/90. Weight up several lb. Ears - bilateral TM's and external ear canals normal  Nose - mucosal congestion  Mouth - mucous membranes moist, pharynx normal without lesions  Neck - supple, no significant adenopathy, carotids upstroke normal bilaterally, no bruits, thyroid exam: thyroid is normal in size without nodules or tenderness, pain along left sternomastoid with extreme rightward gaze, no palpable adenopathy at left sternomastoid. Chest - clear to auscultation, no wheezes, rales or rhonchi, symmetric air entry  Heart - normal rate, regular rhythm, normal S1, S2, no murmurs, rubs, clicks or gallops  Abdomen - soft, nontender, nondistended, no masses or organomegaly  Extremities - no pedal edema noted  Skin-- dry, no scale, some areas of hyperpigmentation. Assessment/ Plan:      The encounter diagnosis was Essential hypertension. Poor control. Start walking daily, schedule RD appt to help with working on weight loss and diet for htn. Add 12.5 mg hctz, recheck 6 weeks. Allergic rhinitis-- zyrtec nightly. Will get otc. Orders Placed This Encounter    METABOLIC PANEL, COMPREHENSIVE     Standing Status:   Future     Standing Expiration Date:   11/22/2019    hydroCHLOROthiazide (HYDRODIURIL) 12.5 mg tablet     Sig: Take 1 Tab by mouth daily. Dispense:  90 Tab     Refill:  0    benzonatate (TESSALON) 200 mg capsule     Sig: Take 1 Cap by mouth three (3) times daily as needed for Cough for up to 7 days.      Dispense:  14 Cap     Refill:  1

## 2019-05-23 ENCOUNTER — TELEPHONE (OUTPATIENT)
Dept: FAMILY MEDICINE CLINIC | Age: 64
End: 2019-05-23

## 2019-05-23 DIAGNOSIS — R89.9 ABNORMAL LABORATORY TEST: Primary | ICD-10-CM

## 2019-05-23 LAB
ALBUMIN SERPL-MCNC: 4.5 G/DL (ref 3.5–5)
ALBUMIN/GLOB SERPL: 1.3 {RATIO} (ref 1.1–2.2)
ALP SERPL-CCNC: 102 U/L (ref 45–117)
ALT SERPL-CCNC: 55 U/L (ref 12–78)
ANION GAP SERPL CALC-SCNC: 8 MMOL/L (ref 5–15)
AST SERPL-CCNC: 45 U/L (ref 15–37)
BILIRUB SERPL-MCNC: 0.5 MG/DL (ref 0.2–1)
BUN SERPL-MCNC: 12 MG/DL (ref 6–20)
BUN/CREAT SERPL: 18 (ref 12–20)
CALCIUM SERPL-MCNC: 10 MG/DL (ref 8.5–10.1)
CHLORIDE SERPL-SCNC: 102 MMOL/L (ref 97–108)
CO2 SERPL-SCNC: 29 MMOL/L (ref 21–32)
CREAT SERPL-MCNC: 0.66 MG/DL (ref 0.55–1.02)
GLOBULIN SER CALC-MCNC: 3.6 G/DL (ref 2–4)
GLUCOSE SERPL-MCNC: 157 MG/DL (ref 65–100)
POTASSIUM SERPL-SCNC: 3.9 MMOL/L (ref 3.5–5.1)
PROT SERPL-MCNC: 8.1 G/DL (ref 6.4–8.2)
SODIUM SERPL-SCNC: 139 MMOL/L (ref 136–145)

## 2019-05-23 NOTE — PROGRESS NOTES
Had A1C of 7.5 2016, doesn't look like she was treated for diabetes. I will contact her to come in for A1C this week.

## 2019-05-23 NOTE — PROGRESS NOTES
LM on cell that glucose was elevated and to rtc for additional testing, needs A1C. I am also sending a note in [employer]. If I don't hear from her, will send a letter.

## 2019-05-24 NOTE — TELEPHONE ENCOUNTER
Returned the pt's call in  from 05/23/19. The pt was asking about the phone call she received from the provider. The pt was given the message regarding her glucose was elevated and the provider had wanted her to come back to the clinic for a Lab only visit. The pt prefers an appt the lab only appt was made for 05/29/19 at 9:00am. Address and name of location reviewed with the pt.  Shital Calzada RN

## 2019-05-29 ENCOUNTER — LAB ONLY (OUTPATIENT)
Dept: FAMILY MEDICINE CLINIC | Age: 64
End: 2019-05-29

## 2019-05-29 ENCOUNTER — HOSPITAL ENCOUNTER (OUTPATIENT)
Dept: LAB | Age: 64
Discharge: HOME | End: 2019-05-29

## 2019-05-29 DIAGNOSIS — R89.9 ABNORMAL LABORATORY TEST: ICD-10-CM

## 2019-05-29 PROCEDURE — 83036 HEMOGLOBIN GLYCOSYLATED A1C: CPT

## 2019-05-29 NOTE — PROGRESS NOTES
Patient came in today for a lab only visit per Dr. Haider Aj. A1C was drawn from the R-AC without complications. Results pending.

## 2019-05-30 LAB
EST. AVERAGE GLUCOSE BLD GHB EST-MCNC: 189 MG/DL
HBA1C MFR BLD: 8.2 % (ref 4.2–6.3)

## 2019-06-17 NOTE — PROGRESS NOTES
On no meds for diabetes and pt is unaware that she has diabetes. F/u is not until July, I will contact her.

## 2019-06-18 NOTE — PROGRESS NOTES
LM to call me, also am writing her a note in [employer]. If she calls when I am not here, the CBN can let her know she does have diabetes and that I want to see how diet/exercise work on her glucose until the next visit 7/10.

## 2019-06-27 NOTE — PROGRESS NOTES
Tc to the pt to check and see if she had gotten any of the messages sent by providers in [facility] and . Also checking to see if she had gotten Medicaid. No answer. Left a message on her mobile number listed.  Asif Vasquez RN

## 2019-07-05 DIAGNOSIS — I10 ESSENTIAL HYPERTENSION WITH GOAL BLOOD PRESSURE LESS THAN 130/80: ICD-10-CM

## 2019-07-05 NOTE — TELEPHONE ENCOUNTER
LOV here Jan 2019. Last seen with Dr. Rabia Ellison for BP mgt 5-22-19 and has follow-up as below:    Future Appointments   Date Time Provider Brittany Olivera   7/10/2019 11:30 AM Duard Banks, MD [place] [address]   7/19/2019 10:00 AM Bon MAJANO, RD [facility] SCHED     Routed amlodipine and lisinopril refill requests to new PCP to review.

## 2019-07-06 RX ORDER — LISINOPRIL 20 MG/1
TABLET ORAL
Qty: 90 TAB | Refills: 0 | Status: SHIPPED | OUTPATIENT
Start: 2019-07-06 | End: 2020-03-19 | Stop reason: SDUPTHER

## 2019-07-06 RX ORDER — AMLODIPINE BESYLATE 10 MG/1
TABLET ORAL
Qty: 90 TAB | Refills: 0 | Status: SHIPPED | OUTPATIENT
Start: 2019-07-06 | End: 2020-03-19 | Stop reason: SDUPTHER

## 2019-07-08 NOTE — TELEPHONE ENCOUNTER
Medications refilled by Dr. Emeterio Murrell. PCP changed to reflect current PCP in system.     Requested Prescriptions     Signed Prescriptions Disp Refills    lisinopril (PRINIVIL, ZESTRIL) 20 mg tablet 90 Tab 0     Sig: TAKE ONE TABLET BY MOUTH ONE TIME DAILY WITH 10MG AMLODIPINE TABLET AS REVIEWED     Authorizing Provider: Mai Priest    amLODIPine (NORVASC) 10 mg tablet 90 Tab 0     Sig: TAKE ONE TABLET BY MOUTH ONE TIME DAILY     Authorizing Provider: Mai Priest

## 2019-07-10 ENCOUNTER — OFFICE VISIT (OUTPATIENT)
Dept: FAMILY MEDICINE CLINIC | Age: 64
End: 2019-07-10

## 2019-07-10 VITALS
SYSTOLIC BLOOD PRESSURE: 150 MMHG | HEART RATE: 79 BPM | BODY MASS INDEX: 34.16 KG/M2 | DIASTOLIC BLOOD PRESSURE: 76 MMHG | WEIGHT: 199 LBS | TEMPERATURE: 98.3 F

## 2019-07-10 DIAGNOSIS — Z13.9 ENCOUNTER FOR SCREENING: Primary | ICD-10-CM

## 2019-07-10 LAB — GLUCOSE POC: NORMAL MG/DL

## 2019-07-10 RX ORDER — CLONIDINE HYDROCHLORIDE 0.1 MG/1
TABLET ORAL
Qty: 30 TAB | Refills: 5 | Status: SHIPPED | OUTPATIENT
Start: 2019-07-10 | End: 2020-06-18

## 2019-07-10 RX ORDER — CETIRIZINE HCL 10 MG
10 TABLET ORAL
COMMUNITY
Start: 2019-07-10 | End: 2020-07-07 | Stop reason: SDUPTHER

## 2019-07-10 NOTE — PROGRESS NOTES
Per Dr. Dhruv Mcghee request, I gave pt handout on [address] [place] [address] and explained how to apply for Medicaid. Pt taken to registration to make return appointments as directed by provider today. If she qualifies for Medicaid, she will cancel her follow up appointment with us.  Gab Robles RN

## 2019-07-10 NOTE — PROGRESS NOTES
Results for orders placed or performed in visit on 07/10/19   AMB POC GLUCOSE BLOOD, BY GLUCOSE MONITORING DEVICE   Result Value Ref Range    Glucose  nf mg/dL

## 2019-07-10 NOTE — PROGRESS NOTES
Subjective:     Chief Complaint   Patient presents with    Hypertension    Diabetes     she is a 60y.o. year old female who presents for evalution. Didn't start the hctz because she wants to bring down her bp with lifestyle changes. Working on diet, only walking a little, and no other exercise. Didn't get the message that she has diabetes-- A1c was 8.2. Hasn't applied for medicaid, isn't sure how to. Zyrtec has resolved all allergy sx. Past Medical History:   Diagnosis Date    Hepatic steatosis 2013    CT imaging.  Hypertension     Pap smear for cervical cancer screening 07/2016    Normal with negative HPV--repeat 2yr with gyn. Objective:     Vitals:    07/10/19 1145   BP: 150/76   Pulse: 79   Temp: 98.3 °F (36.8 °C)   TempSrc: Oral   Weight: 199 lb (90.3 kg)       Physical Examination: General appearance - alert, well appearing, and in no distress and wt down 4 lb. Neck - supple, no significant adenopathy, thyroid exam: thyroid is normal in size without nodules or tenderness  Chest - clear to auscultation, no wheezes, rales or rhonchi, symmetric air entry  Heart - normal rate, regular rhythm, normal S1, S2, no murmurs, rubs, clicks or gallops  Extremities - no pedal edema noted    gucose 204 after 2 waffles and sausage for breakfast-- several hours pp  Assessment/ Plan:   1. T2D, showed her that A1C from 3 years ago was 7.5, discussed that she has had diabetes for several years and that we can start med today, but main tx is still lifestyle changes. She prefers to work on adding exercise in, with continued dietary changes. Has appt with RD that she intends to keep. Discussed that elevated glucose today is due to carb intake at breakfast with no exercise. Discussed that she may want to get a glucometer to check glucoses before and after eating. Goal before 120 or less, goal after 160 or less.   2.  Htn, bp still above goal.  I discussed that the changes she plans in lifestyle may improve htn also. No med change yet. 3.  Allergic rhinitis, well-controlled. No results found for any visits on 07/10/19. Orders Placed This Encounter    cetirizine (ZYRTEC) 10 mg tablet     Sig: Take 1 Tab by mouth nightly.  cloNIDine HCl (CATAPRES) 0.1 mg tablet     Si at hs for hot flashes.      Dispense:  30 Tab     Refill:  5

## 2019-07-19 ENCOUNTER — OFFICE VISIT (OUTPATIENT)
Dept: FAMILY MEDICINE CLINIC | Age: 64
End: 2019-07-19

## 2019-07-19 DIAGNOSIS — Z71.3 DIETARY COUNSELING AND SURVEILLANCE: Primary | ICD-10-CM

## 2019-07-19 NOTE — PROGRESS NOTES
DATE: 2019      REFERRING PHYSICIAN: Dr. Mary Clarke  NAME: Susanne Umana : 1955 AGE: 60 y.o. GENDER: female  REASON FOR VISIT: newly dx T2DM      LABS:   Lab Results   Component Value Date/Time    Hemoglobin A1c 8.2 (H) 2019 09:22 AM       MEDICATIONS/SUPPLEMENTS:     Prior to Admission medications    Medication Sig Start Date End Date Taking? Authorizing Provider   cetirizine (ZYRTEC) 10 mg tablet Take 1 Tab by mouth nightly. 7/10/19   Mariaelena Dinero MD   cloNIDine HCl (CATAPRES) 0.1 mg tablet 1 at hs for hot flashes. 7/10/19   Mariaelena Dinero MD   lisinopril (PRINIVIL, ZESTRIL) 20 mg tablet TAKE ONE TABLET BY MOUTH ONE TIME DAILY WITH 10MG AMLODIPINE TABLET AS REVIEWED 19   Mariaelena Dinero MD   amLODIPine (NORVASC) 10 mg tablet TAKE ONE TABLET BY MOUTH ONE TIME DAILY 19   Mariaelena Dinero MD       FOOD ALLERGIES/INTOLERANCES: NA    ANTHROPOMETRICS:    Ht Readings from Last 1 Encounters:   19 5' 4\" (1.626 m)      Wt Readings from Last 1 Encounters:   07/10/19 199 lb (90.3 kg)         Reported Diet Hx:  No more fast food  No more soda  2 salads/day   24 Hour Diet Recall  Breakfast  None   Lunch  Salad   Dinner [employer]  [employer], 1   Snacks     Beverages  Water  Fruit smoothie from [employer]     Exercise/Physical Actvity:    Not currently  Plans to walk daily        NUTRITION INTERVENTION:    Introduced MyPlate and discussed how it can help to ensure both balance and variety. Reviewed the food groups and what each group contributes to our bodies. RD introduced CHO foods and how they affect BG. Discussed that some foods have added sugar and some have naturally occurring sugars. Using food models, RD displayed common CHO foods and their appropriate portion sizes. Emphasized that these foods need to be limited, portioned and always eaten in combination with PRO.      Handouts provided: MyPlate placemat, V8NK nutrition therapy    PATIENT GOALS:  Small meal at breakfast (toast with PB)  Add PRO to salads    Specific tips and techniques to facilitate compliance with above recommendations were provided and discussed. Pt was strongly encouraged to begin making necessary changes now, and re-visit the dietitian prjah.             Luis F Cheng RD

## 2020-03-19 DIAGNOSIS — I10 ESSENTIAL HYPERTENSION WITH GOAL BLOOD PRESSURE LESS THAN 130/80: ICD-10-CM

## 2020-03-19 NOTE — TELEPHONE ENCOUNTER
From Staff Msgs:          Med refill request for Amlodipine 10 mg, Lisinopril 20 mg until scheduled appt on 04/17/20.

## 2020-03-19 NOTE — TELEPHONE ENCOUNTER
Pended medications not sent to pharmacy during patient's last office visit. Please send to pharmacy on file.

## 2020-03-21 RX ORDER — LISINOPRIL 20 MG/1
TABLET ORAL
Qty: 90 TAB | Refills: 0 | Status: SHIPPED | OUTPATIENT
Start: 2020-03-21 | End: 2020-06-18 | Stop reason: SDUPTHER

## 2020-03-21 RX ORDER — AMLODIPINE BESYLATE 10 MG/1
TABLET ORAL
Qty: 90 TAB | Refills: 0 | Status: SHIPPED | OUTPATIENT
Start: 2020-03-21 | End: 2020-06-18 | Stop reason: SDUPTHER

## 2020-03-21 NOTE — TELEPHONE ENCOUNTER
Refill request(s) approved--lisinopril, amlodipine. Refill protocol details (computer-generated) reviewed.

## 2020-06-18 ENCOUNTER — OFFICE VISIT (OUTPATIENT)
Dept: INTERNAL MEDICINE CLINIC | Age: 65
End: 2020-06-18

## 2020-06-18 VITALS
TEMPERATURE: 98.7 F | HEIGHT: 64 IN | OXYGEN SATURATION: 98 % | SYSTOLIC BLOOD PRESSURE: 172 MMHG | WEIGHT: 200.13 LBS | BODY MASS INDEX: 34.17 KG/M2 | HEART RATE: 80 BPM | DIASTOLIC BLOOD PRESSURE: 82 MMHG | RESPIRATION RATE: 16 BRPM

## 2020-06-18 DIAGNOSIS — E11.65 CONTROLLED TYPE 2 DIABETES MELLITUS WITH HYPERGLYCEMIA, WITHOUT LONG-TERM CURRENT USE OF INSULIN (HCC): ICD-10-CM

## 2020-06-18 DIAGNOSIS — M79.18 PAIN IN DELTOID, BILATERAL: ICD-10-CM

## 2020-06-18 DIAGNOSIS — Z00.00 WELL ADULT HEALTH CHECK: ICD-10-CM

## 2020-06-18 DIAGNOSIS — M25.512 BILATERAL SHOULDER PAIN, UNSPECIFIED CHRONICITY: ICD-10-CM

## 2020-06-18 DIAGNOSIS — I10 ESSENTIAL HYPERTENSION WITH GOAL BLOOD PRESSURE LESS THAN 130/80: Primary | ICD-10-CM

## 2020-06-18 DIAGNOSIS — E78.00 HYPERCHOLESTEREMIA: ICD-10-CM

## 2020-06-18 DIAGNOSIS — M25.511 BILATERAL SHOULDER PAIN, UNSPECIFIED CHRONICITY: ICD-10-CM

## 2020-06-18 RX ORDER — LISINOPRIL 40 MG/1
TABLET ORAL
Qty: 90 TAB | Refills: 1 | Status: SHIPPED | OUTPATIENT
Start: 2020-06-18 | End: 2020-07-07 | Stop reason: SDUPTHER

## 2020-06-18 RX ORDER — BISMUTH SUBSALICYLATE 262 MG
1 TABLET,CHEWABLE ORAL DAILY
COMMUNITY

## 2020-06-18 RX ORDER — AMLODIPINE BESYLATE 10 MG/1
TABLET ORAL
Qty: 90 TAB | Refills: 1 | Status: SHIPPED | OUTPATIENT
Start: 2020-06-18 | End: 2020-12-22

## 2020-06-18 NOTE — PATIENT INSTRUCTIONS
1.  To make sure your home cuff is accurate:     Check your BP and heart rate. Compare these values with a validated monitor (grocery store, pharmacy, manual cuff with medical provider/clinic), to be sure the home cuff readings are accurate. To be accurate, recommend that the SBP's (systolic BP's or top numbers) be within 10 points of each other, and the DBP's (diastolic BP's or bottom numbers) to be within 5 points of each other. If your home meter is accurate, keep a log as reviewed, and will review here at your BP follow-up visit. If you note values >135/85 (either number) consistently, please return sooner to review. 2.  Please call the [facility] to set up an appointment--you can do this directly, without a referral.  Their number is [phone]. [facility] also has a program with [employer] to [email or web address] a Dietitian\". You can have a complimentary conversation by calling [facility] at [phone]. --When prompted, enter code [[phone]]. --They are available Monday through Friday 9AM to 5PM EST. You can discuss nutrition topics below with them:  --general diet and nutrition,   --help manage blood sugar and weight. If problems, please call our clinic Nurses for assistance or alternative resources. 3.  If blood sugar/A1c is elevated, will start metformin as reviewed. If needed, wll start the metformin as one tab once a day with meals, and increase as follows: In 1 week, if tolerating well, increase to one tab two times a day with meals. In 2 weeks, if tolerating well, increase to two tabs in the morning and continue one tab with evening meal.  In 3 weeks, if tolerating well, increase to two tabs two times a day with meals. 4.  Weight Loss Education/Recommendations    1. Eat three meals a day, with three snacks in between. Eat approximately every three hours.   Never skip a meal.    2.  Eat protein with each meal.    Protein should be 25-30 grams per meal.  This is equal to your palm size. Examples of protein include fish, chicken, pork, lean meats. 3.  Limit carbohydrates. Eat 30 grams or less of carbohydrate per meal.  No carbohydrates before noon. Carbohydrates for snacks should be 15 grams or less. Carbohydrates that are good are vegetables and fruit. Limit amount of fruit intake due to natural sugars. Use carbohydrate count book to help limit carbohydrates. 4.  Drink 6-8 glasses of water a day. 5.  Incorporate exercise if possible. Start with 30 minutes a day for 3-4 days per week. Increase amount and number of days gradually but consistently over time. 6.  Weigh self weekly. Weigh on the same day at the same time each day. Learning About High Cholesterol  What is high cholesterol? High cholesterol means that you have too much cholesterol in your blood. Cholesterol is a type of fat. It's needed for many body functions, such as making new cells. Cholesterol is made by your body. It also comes from food you eat. Having high cholesterol can lead to the buildup of plaque in artery walls. This can increase your risk of heart disease and stroke. When your doctor talks about high cholesterol levels, he or she is talking about your total cholesterol and LDL cholesterol (the \"bad\" cholesterol) levels. Your doctor may also speak about HDL (the \"good\" cholesterol) levels. High HDL is linked with a lower risk for heart disease, heart attack, and stroke. Your cholesterol levels help your doctor find out your risk for having a heart attack or stroke. How can you prevent high cholesterol? A heart-healthy lifestyle can help you prevent high cholesterol. This lifestyle helps lower your risk for a heart attack and stroke. · Eat heart-healthy foods.   ? Eat fruits, vegetables, whole grains (like oatmeal), dried beans and peas, nuts and seeds, soy products (like tofu), and fat-free or low-fat dairy products. ? Replace butter, margarine, and hydrogenated or partially hydrogenated oils with olive and canola oils. (Canola oil margarine without trans fat is fine.)  ? Replace red meat with fish, poultry, and soy protein (like tofu). ? Limit processed and packaged foods like chips, crackers, and cookies. · Be active. Exercise can improve your cholesterol level. Get at least 30 minutes of exercise on most days of the week. Walking is a good choice. You also may want to do other activities, such as running, swimming, cycling, or playing tennis or team sports. · Stay at a healthy weight. Lose weight if you need to. · Don't smoke. If you need help quitting, talk to your doctor about stop-smoking programs and medicines. These can increase your chances of quitting for good. How is high cholesterol treated? The goal of treatment is to reduce your chances of having a heart attack or stroke. The goal is not to lower your cholesterol numbers only. · You may make lifestyle changes, such as eating healthy foods, not smoking, losing weight, and being more active. · You may have to take medicine. Follow-up care is a key part of your treatment and safety. Be sure to make and go to all appointments, and call your doctor if you are having problems. It's also a good idea to know your test results and keep a list of the medicines you take. Where can you learn more? Go to http://patrice-alia.info/  Enter Q621 in the search box to learn more about \"Learning About High Cholesterol. \"  Current as of: December 16, 2019               Content Version: 12.5  © 2366-8777 Healthwise, Incorporated. Care instructions adapted under license by [employer] (which disclaims liability or warranty for this information).  If you have questions about a medical condition or this instruction, always ask your healthcare professional. [address] any warranty or liability for your use of this information. High Cholesterol: Care Instructions  Your Care Instructions     Cholesterol is a type of fat in your blood. It is needed for many body functions, such as making new cells. Cholesterol is made by your body. It also comes from food you eat. High cholesterol means that you have too much of the fat in your blood. This raises your risk of a heart attack and stroke. LDL and HDL are part of your total cholesterol. LDL is the \"bad\" cholesterol. High LDL can raise your risk for heart disease, heart attack, and stroke. HDL is the \"good\" cholesterol. It helps clear bad cholesterol from the body. High HDL is linked with a lower risk of heart disease, heart attack, and stroke. Your cholesterol levels help your doctor find out your risk for having a heart attack or stroke. You and your doctor can talk about whether you need to lower your risk and what treatment is best for you. A heart-healthy lifestyle along with medicines can help lower your cholesterol and your risk. The way you choose to lower your risk will depend on how high your risk is for heart attack and stroke. It will also depend on how you feel about taking medicines. Follow-up care is a key part of your treatment and safety. Be sure to make and go to all appointments, and call your doctor if you are having problems. It's also a good idea to know your test results and keep a list of the medicines you take. How can you care for yourself at home? · Eat a variety of foods every day. Good choices include fruits, vegetables, whole grains (like oatmeal), dried beans and peas, nuts and seeds, soy products (like tofu), and fat-free or low-fat dairy products. · Replace butter, margarine, and hydrogenated or partially hydrogenated oils with olive and canola oils. (Canola oil margarine without trans fat is fine.)  · Replace red meat with fish, poultry, and soy protein (like tofu).   · Limit processed and packaged foods like chips, crackers, and cookies. · Bake, broil, or steam foods. Don't rivera them. · Be physically active. Get at least 30 minutes of exercise on most days of the week. Walking is a good choice. You also may want to do other activities, such as running, swimming, cycling, or playing tennis or team sports. · Stay at a healthy weight or lose weight by making the changes in eating and physical activity listed above. Losing just a small amount of weight, even 5 to 10 pounds, can reduce your risk for having a heart attack or stroke. · Do not smoke. When should you call for help? Watch closely for changes in your health, and be sure to contact your doctor if:  · You need help making lifestyle changes. · You have questions about your medicine. Where can you learn more? Go to http://patriceOyaGenalia.info/  Enter K044 in the search box to learn more about \"High Cholesterol: Care Instructions. \"  Current as of: December 16, 2019               Content Version: 12.5  © 2006-2020 [employer] Care instructions adapted under license by [employer] (which disclaims liability or warranty for this information). If you have questions about a medical condition or this instruction, always ask your healthcare professional. [address] any warranty or liability for your use of this information. Shoulder Stretches: Exercises  Introduction  Here are some examples of exercises for you to try. The exercises may be suggested for a condition or for rehabilitation. Start each exercise slowly. Ease off the exercises if you start to have pain. You will be told when to start these exercises and which ones will work best for you. How to do the exercises  Shoulder stretch   1.  a doorway and place one arm against the door frame. Your elbow should be a little higher than your shoulder. 2. Relax your shoulders as you lean forward, allowing your chest and shoulder muscles to stretch.  You can also turn your body slightly away from your arm to stretch the muscles even more. 3. Hold for 15 to 30 seconds. 4. Repeat 2 to 4 times with each arm. Shoulder and chest stretch   1. Shoulder and chest stretch  2. While sitting, relax your upper body so you slump slightly in your chair. 3. As you breathe in, straighten your back and open your arms out to the sides. 4. Gently pull your shoulder blades back and downward. 5. Hold for 15 to 30 seconds as your breathe normally. 6. Repeat 2 to 4 times. Overhead stretch   1. Reach up over your head with both arms. 2. Hold for 15 to 30 seconds. 3. Repeat 2 to 4 times. Follow-up care is a key part of your treatment and safety. Be sure to make and go to all appointments, and call your doctor if you are having problems. It's also a good idea to know your test results and keep a list of the medicines you take. Where can you learn more? Go to http://patrice-alia.info/  Enter S254 in the search box to learn more about \"Shoulder Stretches: Exercises. \"  Current as of: March 2, 2020               Content Version: 12.5  © 2557-1203 Healthwise, Incorporated. Care instructions adapted under license by [employer] (which disclaims liability or warranty for this information). If you have questions about a medical condition or this instruction, always ask your healthcare professional. [place] [address] any warranty or liability for your use of this information.

## 2020-06-18 NOTE — PROGRESS NOTES
History of Present Illness:   Keith Bowles is a 63 y.o. female here for evaluation:    Chief Complaint   Patient presents with    Complete Physical     Patient fasting. Notes (nursing/rooming note copied below in italics):  None    Here for BP follow-up and physical.    She had last visit here Jan 2019. Last last with Dr. Nakul Gomez with [facility] in May 2019. Hemoglobin A1c   Date Value Ref Range Status   05/29/2019 8.2 (H) 4.2 - 6.3 % Final   07/11/2016 7.5 (H) 4.8 - 5.6 % Final     Comment:              Pre-diabetes: 5.7 - 6.4           Diabetes: >6.4           Glycemic control for adults with diabetes: <7.0       Estimated average glucose   Date Value Ref Range Status   07/11/2016 169 mg/dL Final     Glucose   Date Value Ref Range Status   05/22/2019 157 (H) 65 - 100 mg/dL Final   03/02/2018 164 (H) 65 - 100 mg/dL Final   09/13/2016 134 (H) 65 - 99 mg/dL Final   07/11/2016 124 (H) 65 - 99 mg/dL Final   07/01/2013 190 (H) 65 - 100 MG/DL Final     Cholesterol, total   Date Value Ref Range Status   08/22/2016 226 (H) 100 - 199 mg/dL Final     Triglyceride   Date Value Ref Range Status   08/22/2016 139 0 - 149 mg/dL Final     HDL Cholesterol   Date Value Ref Range Status   08/22/2016 52 >39 mg/dL Final     Comment:     According to ATP-III Guidelines, HDL-C >59 mg/dL is considered a  negative risk factor for CHD. LDL, calculated   Date Value Ref Range Status   08/22/2016 146 (H) 0 - 99 mg/dL Final     ALT (SGPT)   Date Value Ref Range Status   05/22/2019 55 12 - 78 U/L Final   07/11/2016 29 0 - 32 IU/L Final     Creatine Kinase,Total   Date Value Ref Range Status   08/22/2016 412 (H) 24 - 173 U/L Final         BP Readings from Last 3 Encounters:   06/18/20 172/82   07/10/19 150/76   05/22/19 170/89       She notes she had not ever filled the addition of HCTZ with Dr. Cook Hem screenings reviewed by provider at visit. Reviewed mgt with additional BP meds.   She has had interim weight gain with COVID-restrictions. She now has an insurance plan to support her medications. She checks/monitors BP through grocery store. Nursing screenings reviewed by provider at visit. Prior to Admission medications    Medication Sig Start Date End Date Taking? Authorizing Provider   multivitamin (ONE A DAY) tablet Take 1 Tab by mouth daily. Yes Provider, Historical   amLODIPine (NORVASC) 10 mg tablet TAKE ONE TABLET BY MOUTH ONE TIME DAILY 3/21/20  Yes Franco Aguero MD   lisinopriL (PRINIVIL, ZESTRIL) 20 mg tablet TAKE ONE TABLET BY MOUTH ONE TIME DAILY WITH 10MG AMLODIPINE TABLET AS REVIEWED 3/21/20  Yes Franco Aguero MD   cetirizine (ZYRTEC) 10 mg tablet Take 1 Tab by mouth nightly. 7/10/19  Yes Lubna Carpenter MD   cloNIDine HCl (CATAPRES) 0.1 mg tablet 1 at hs for hot flashes. 7/10/19   Lubna Carpenter MD        ROS  Complete ROS negative except as indicated in note. Vitals:    06/18/20 0943   BP: 172/82   Pulse: 80   Resp: 16   Temp: 98.7 °F (37.1 °C)   TempSrc: Oral   SpO2: 98%   Weight: 200 lb 2 oz (90.8 kg)   Height: 5' 4\" (1.626 m)   PainSc:   0 - No pain      Body mass index is 34.35 kg/m². Physical Exam:     Physical Exam  Vitals signs and nursing note reviewed. Constitutional:       General: She is not in acute distress. Appearance: Normal appearance. She is well-developed. She is not diaphoretic. HENT:      Head: Normocephalic and atraumatic. Mouth/Throat:      Mouth: Mucous membranes are moist.   Eyes:      General: No scleral icterus. Right eye: No discharge. Left eye: No discharge. Conjunctiva/sclera: Conjunctivae normal.   Neck:      Musculoskeletal: Neck supple. No neck rigidity. Cardiovascular:      Rate and Rhythm: Normal rate and regular rhythm. Pulses: Normal pulses. Heart sounds: Normal heart sounds. No murmur. No friction rub. No gallop.     Pulmonary:      Effort: Pulmonary effort is normal. No respiratory distress. Breath sounds: Normal breath sounds. No stridor. No wheezing or rhonchi. Abdominal:      General: Bowel sounds are normal.      Palpations: Abdomen is soft. Tenderness: There is no abdominal tenderness. Musculoskeletal:         General: No deformity or signs of injury. Right lower leg: No edema. Left lower leg: No edema. Comments: Localizes pain to deltoids bilat. No pain biceps tendon insertion bilat. No pain in rotator cuff bilat  No suprascapular pain. No AC joint or clavicular pain  Negative bilat empty-can sign. Full motion to flexion/extension. No numbness or tingling UE noted. Strength grossly normal.   Skin:     General: Skin is warm. Coloration: Skin is not jaundiced or pale. Findings: No bruising, erythema, lesion or rash. Neurological:      General: No focal deficit present. Mental Status: She is alert. Motor: No abnormal muscle tone. Coordination: Coordination normal.      Gait: Gait normal.   Psychiatric:         Mood and Affect: Mood normal.         Behavior: Behavior normal.         Thought Content: Thought content normal.         Judgment: Judgment normal.         Assessment and Plan:       ICD-10-CM ICD-9-CM    1. Essential hypertension with goal blood pressure less than 130/80 I10 401.9 OTHER      amLODIPine (NORVASC) 10 mg tablet      lisinopriL (PRINIVIL, ZESTRIL) 40 mg tablet   2. Well adult health check Z00.00 V70.0 CBC WITH AUTOMATED DIFF      METABOLIC PANEL, COMPREHENSIVE      LIPID PANEL      CK      HEMOGLOBIN A1C WITH EAG   3. Controlled type 2 diabetes mellitus with hyperglycemia, without long-term current use of insulin (HCC) E11.65 250.80 HEMOGLOBIN A1C WITH EAG     790.29 MICROALBUMIN, UR, RAND W/ MICROALB/CREAT RATIO   4. Pain in deltoid, bilateral M79.18 729.1 REFERRAL TO PHYSICAL THERAPY   5. Bilateral shoulder pain, unspecified chronicity M25.511 719.41 REFERRAL TO PHYSICAL THERAPY    M25.512     6. Hypercholesteremia E78.00 272.0 LIPID PANEL      CK       1. Home BP cuff and monitoring reviewed at visit. BP medications reviewed and refilled as above. Increase lisinopril as reviewed. 2,3,6:  Fasting labs reviewed. 4,5:  Referral(s) and referral coordination reviewed with patient at visit. Follow-up and Dispositions    · Return in about 2 weeks (around 7/2/2020) for VV--MyChart or phone call for BP follow-up.       lab results and schedule of future lab studies reviewed with patient  reviewed medications and side effects in detail  radiology results and schedule of future radiology studies reviewed with patient--shoulder x-ray bilat reviewed if needed. For additional documentation of information and/or recommendations discussed this visit, please see notes in instructions. Plan and evaluation (above) reviewed with pt at visit  Patient voiced understanding of plan and provided with time to ask/review questions. After Visit Summary (AVS) provided to pt after visit with additional instructions as needed/reviewed. Future Appointments   Date Time Provider Brittany Olivera   7/7/2020  8:30 AM Ivan Yap MD [id number]    --Updated future visits after patient check-out.

## 2020-06-19 LAB
ALBUMIN SERPL-MCNC: 4.8 G/DL (ref 3.8–4.8)
ALBUMIN/CREAT UR: 6 MG/G CREAT (ref 0–29)
ALBUMIN/GLOB SERPL: 1.7 {RATIO} (ref 1.2–2.2)
ALP SERPL-CCNC: 88 IU/L (ref 39–117)
ALT SERPL-CCNC: 25 IU/L (ref 0–32)
AST SERPL-CCNC: 25 IU/L (ref 0–40)
BASOPHILS # BLD AUTO: 0 X10E3/UL (ref 0–0.2)
BASOPHILS NFR BLD AUTO: 1 %
BILIRUB SERPL-MCNC: 0.4 MG/DL (ref 0–1.2)
BUN SERPL-MCNC: 13 MG/DL (ref 8–27)
BUN/CREAT SERPL: 18 (ref 12–28)
CALCIUM SERPL-MCNC: 10.1 MG/DL (ref 8.7–10.3)
CHLORIDE SERPL-SCNC: 99 MMOL/L (ref 96–106)
CHOLEST SERPL-MCNC: 247 MG/DL (ref 100–199)
CK SERPL-CCNC: 273 U/L (ref 32–182)
CO2 SERPL-SCNC: 26 MMOL/L (ref 20–29)
CREAT SERPL-MCNC: 0.71 MG/DL (ref 0.57–1)
CREAT UR-MCNC: 89.6 MG/DL
EOSINOPHIL # BLD AUTO: 0.2 X10E3/UL (ref 0–0.4)
EOSINOPHIL NFR BLD AUTO: 2 %
ERYTHROCYTE [DISTWIDTH] IN BLOOD BY AUTOMATED COUNT: 12.8 % (ref 11.7–15.4)
EST. AVERAGE GLUCOSE BLD GHB EST-MCNC: 177 MG/DL
GLOBULIN SER CALC-MCNC: 2.8 G/DL (ref 1.5–4.5)
GLUCOSE SERPL-MCNC: 150 MG/DL (ref 65–99)
HBA1C MFR BLD: 7.8 % (ref 4.8–5.6)
HCT VFR BLD AUTO: 39.2 % (ref 34–46.6)
HDLC SERPL-MCNC: 53 MG/DL
HGB BLD-MCNC: 13.2 G/DL (ref 11.1–15.9)
IMM GRANULOCYTES # BLD AUTO: 0 X10E3/UL (ref 0–0.1)
IMM GRANULOCYTES NFR BLD AUTO: 0 %
LDLC SERPL CALC-MCNC: 159 MG/DL (ref 0–99)
LYMPHOCYTES # BLD AUTO: 2.6 X10E3/UL (ref 0.7–3.1)
LYMPHOCYTES NFR BLD AUTO: 30 %
MCH RBC QN AUTO: 29.7 PG (ref 26.6–33)
MCHC RBC AUTO-ENTMCNC: 33.7 G/DL (ref 31.5–35.7)
MCV RBC AUTO: 88 FL (ref 79–97)
MICROALBUMIN UR-MCNC: 5.8 UG/ML
MONOCYTES # BLD AUTO: 0.6 X10E3/UL (ref 0.1–0.9)
MONOCYTES NFR BLD AUTO: 7 %
NEUTROPHILS # BLD AUTO: 5.1 X10E3/UL (ref 1.4–7)
NEUTROPHILS NFR BLD AUTO: 60 %
PLATELET # BLD AUTO: 333 X10E3/UL (ref 150–450)
POTASSIUM SERPL-SCNC: 4.2 MMOL/L (ref 3.5–5.2)
PROT SERPL-MCNC: 7.6 G/DL (ref 6–8.5)
RBC # BLD AUTO: 4.44 X10E6/UL (ref 3.77–5.28)
SODIUM SERPL-SCNC: 140 MMOL/L (ref 134–144)
TRIGL SERPL-MCNC: 173 MG/DL (ref 0–149)
VLDLC SERPL CALC-MCNC: 35 MG/DL (ref 5–40)
WBC # BLD AUTO: 8.5 X10E3/UL (ref 3.4–10.8)

## 2020-06-26 NOTE — TELEPHONE ENCOUNTER
Spoke with patient, possible pharmacist was confused by order patient reported they told her \"freestyle\" is covered which is a blood glucose monitor and not a blood pressure monitor. Patient will call pharmacy to confirm If still con covered or if expensive, she plans to contact insurance regarding coverage for blood pressure monitor and to find out which medical supply company they prefer. Patient also advised [employer] sell Bp cuff over the counter for $20 if all else fails.

## 2020-06-26 NOTE — TELEPHONE ENCOUNTER
Pt called retuning nurses call. Told pt to check with insurance company about what machine they will cover as well as what medical supply company it needs to be sent too.

## 2020-06-26 NOTE — TELEPHONE ENCOUNTER
Left message for patient to call back - possible she can contact insurance to find out which medical supply company they cover and we can fax the order to them, or patient can get BP monitor from [address] [place] [address] for $20 or [employer] as low as $32,

## 2020-06-26 NOTE — TELEPHONE ENCOUNTER
Pt called stating  had given the pt a prescription for a Blood Pressure Monitor Kit. However when the pt went to have the prescription filled [facility] informed her that it was not covered under her insurance. Pt just spoke with her insurance company and that she would need a Prior Authorization to get it covered. [facility] # [id number] pt's # [phone].

## 2020-07-07 ENCOUNTER — VIRTUAL VISIT (OUTPATIENT)
Dept: INTERNAL MEDICINE CLINIC | Age: 65
End: 2020-07-07

## 2020-07-07 DIAGNOSIS — I10 ESSENTIAL HYPERTENSION WITH GOAL BLOOD PRESSURE LESS THAN 130/80: ICD-10-CM

## 2020-07-07 DIAGNOSIS — Z91.09 ENVIRONMENTAL ALLERGIES: ICD-10-CM

## 2020-07-07 DIAGNOSIS — F51.01 PRIMARY INSOMNIA: Primary | ICD-10-CM

## 2020-07-07 RX ORDER — CETIRIZINE HCL 10 MG
10 TABLET ORAL
Qty: 90 TAB | Refills: 3 | Status: SHIPPED | OUTPATIENT
Start: 2020-07-07 | End: 2021-08-07

## 2020-07-07 RX ORDER — LISINOPRIL 40 MG/1
20 TABLET ORAL 2 TIMES DAILY
Qty: 90 TAB | Refills: 1
Start: 2020-07-07 | End: 2020-12-22

## 2020-07-07 RX ORDER — MELATONIN 5 MG
5 CAPSULE ORAL
Qty: 50 CAP | Refills: 1 | Status: SHIPPED | OUTPATIENT
Start: 2020-07-07

## 2020-07-07 NOTE — PROGRESS NOTES
Rosemary Rodriguez is a 63 y.o. female who was seen by synchronous (real-time) audio-video technology on 7/7/2020. Consent: Rosemary Rodriguez, who was seen by synchronous (real-time) audio-video technology, and/or her healthcare decision maker, is aware that this patient-initiated, Telehealth encounter on 7/7/2020 is a billable service, with coverage as determined by her insurance carrier. She is aware that she may receive a bill and has provided verbal consent to proceed: Yes. I was in the office while conducting this encounter. Subjective:   Rosemary Rodriguez was seen for Labs (review) and Blood Pressure Check (Patient states Lisinopril 40 mg is too strong, has returned to taking 20 mg but would like to know if this is ok. )    Notes (nursing/rooming note):  Patient c/o not being able to sleep,would like to have Melatonin prescribed. Notes:  She was not able to get BP cuff covered for home monitoring. She noted insurance requested a fax. Reviewed coordinating orders with us if additional paperwork needed. BP Readings from Last 3 Encounters:   06/18/20 172/82   07/10/19 150/76   05/22/19 170/89     She has not monitored otherwise--working to get cuff from insurance. Reviewed labs done after last visit on 6/18. She had received letter about labs, but didn't have with her at visit to review. She has not reviewed in [address] [employer] access/availability reviewed. Cholesterol, total   Date Value Ref Range Status   06/18/2020 247 (H) 100 - 199 mg/dL Final   08/22/2016 226 (H) 100 - 199 mg/dL Final     Triglyceride   Date Value Ref Range Status   06/18/2020 173 (H) 0 - 149 mg/dL Final   08/22/2016 139 0 - 149 mg/dL Final     HDL Cholesterol   Date Value Ref Range Status   06/18/2020 53 >39 mg/dL Final   08/22/2016 52 >39 mg/dL Final     Comment:     According to ATP-III Guidelines, HDL-C >59 mg/dL is considered a  negative risk factor for CHD.        LDL, calculated   Date Value Ref Range Status   06/18/2020 159 (H) 0 - 99 mg/dL Final   08/22/2016 146 (H) 0 - 99 mg/dL Final     ALT (SGPT)   Date Value Ref Range Status   06/18/2020 25 0 - 32 IU/L Final   05/22/2019 55 12 - 78 U/L Final   07/11/2016 29 0 - 32 IU/L Final     Creatine Kinase,Total   Date Value Ref Range Status   06/18/2020 273 (H) 32 - 182 U/L Final     Comment:                   **Please note reference interval change**   08/22/2016 412 (H) 24 - 173 U/L Final       Hemoglobin A1c   Date Value Ref Range Status   06/18/2020 7.8 (H) 4.8 - 5.6 % Final     Comment:              Prediabetes: 5.7 - 6.4           Diabetes: >6.4           Glycemic control for adults with diabetes: <7.0     05/29/2019 8.2 (H) 4.2 - 6.3 % Final   07/11/2016 7.5 (H) 4.8 - 5.6 % Final     Comment:              Pre-diabetes: 5.7 - 6.4           Diabetes: >6.4           Glycemic control for adults with diabetes: <7.0       Estimated average glucose   Date Value Ref Range Status   06/18/2020 177 mg/dL Final   07/11/2016 169 mg/dL Final     Glucose   Date Value Ref Range Status   06/18/2020 150 (H) 65 - 99 mg/dL Final   05/22/2019 157 (H) 65 - 100 mg/dL Final   03/02/2018 164 (H) 65 - 100 mg/dL Final   09/13/2016 134 (H) 65 - 99 mg/dL Final   07/11/2016 124 (H) 65 - 99 mg/dL Final     She would prefer to work on diet and exercise as reviewed as prior visit, and re-check labs in [facility]. She has resources. Reviewed and added nutrition info to AVS.      Reviewed questions about cardiac stress testing. [facility] No stress testing done. She had normal EKG with LVH only, and normal troponin. She has no CP currently. No angina or angina-equivalents with rest or activity. Benefits of statin, LDL goals reviewed with pt. Cardiology evaluation reviewed with pt. Indications for stress testing reviewed with pt--she has none currently. No outside stress testing done previously.     She has trouble staying asleep and would like to try melatonin. Dosing reviewed. Nursing screenings reviewed by provider at visit. Allergies   Allergen Reactions    Naprosyn [Naproxen] Other (comments)     Hallucinations. Tolerates Ibuprofen without problems. Prior to Admission medications    Medication Sig Start Date End Date Taking? Authorizing Provider   multivitamin (ONE A DAY) tablet Take 1 Tab by mouth daily. Yes Provider, Historical   amLODIPine (NORVASC) 10 mg tablet TAKE ONE TABLET BY MOUTH ONE TIME DAILY 6/18/20  Yes Gela Snider MD   lisinopriL (PRINIVIL, ZESTRIL) 40 mg tablet TAKE ONE TABLET BY MOUTH ONE TIME DAILY WITH 10MG AMLODIPINE TABLET AS REVIEWED. Increase from 20mg dose. Patient taking differently: 20 mg. TAKE ONE TABLET BY MOUTH ONE TIME DAILY WITH 10MG AMLODIPINE TABLET AS REVIEWED. Increase from 20mg dose. 6/18/20  Yes Gela Snider MD   cetirizine (ZYRTEC) 10 mg tablet Take 1 Tab by mouth nightly. 7/10/19  Yes Archana Rico MD   OTHER Dispense blood pressure monitoring kit to pt. Please ensure non-wrist cuff device, with appropriately sized cuff to allow for accurate home BP monitoring. 6/18/20   Gela Snider MD         ROS    PHYSICAL EXAMINATION:    Vital Signs: There were no vitals taken for this visit.      Constitutional: [x] Appears well-developed and well-nourished [x] No apparent distress      Mental status: [x] Alert and awake  [x] Oriented [x] Able to follow commands       Eyes:   EOM    [x]  Normal      Sclera  [x]  Normal              Discharge [x]  None visible       HENT: [x] Normocephalic, atraumatic    [x] Mouth/Throat: Mucous membranes are moist    External Ears [x] Normal      Neck: [x] No visualized mass     Pulmonary/Chest: [x] Respiratory effort normal   [x] No visualized signs of difficulty breathing or respiratory distress    Musculoskeletal:  [x] Normal range of motion of neck    Neurological:        [x] No Facial Asymmetry (Cranial nerve 7 motor function) (limited exam due to video visit)          [x] No gaze palsy     Skin:        [x] No significant exanthematous lesions or discoloration noted on facial skin             Psychiatric:       [x] Normal Affect       Other pertinent observable physical exam findings:  None. We discussed the expected course, resolution and complications of the diagnosis(es) in detail. Medication risks, benefits, costs, interactions, and alternatives were discussed as indicated. I advised her to contact the office if her condition worsens, changes or fails to improve as anticipated. She expressed understanding with the diagnosis(es) and plan. David Chacon is a 63 y.o. female who was evaluated by a video visit encounter for concerns as above. Patient identification was verified prior to start of the visit. A caregiver was present when appropriate. Due to this being a TeleHealth encounter (During DUUGB-95 public health emergency), evaluation of the following organ systems was limited: Vitals/Constitutional/EENT/Resp/CV/GI//MS/Neuro/Skin/Heme-Lymph-Imm. Pursuant to the emergency declaration under the [facility] waiver authority and the [employer] and [employer] Act, this Virtual  Visit was conducted, with patient's (and/or legal guardian's) consent, to reduce the patient's risk of exposure to COVID-19 and provide necessary medical care. Services were provided through a video synchronous discussion virtually to substitute for in-person clinic visit. Assessment & Plan:   Diagnoses and all orders for this visit:      ICD-10-CM ICD-9-CM    1. Primary insomnia F51.01 307.42 melatonin 5 mg cap capsule   2. Essential hypertension with goal blood pressure less than 130/80 I10 401.9 lisinopriL (PRINIVIL, ZESTRIL) 40 mg tablet   3. Environmental allergies Z91.09 V15.09 cetirizine (ZYRTEC) 10 mg tablet       1.   Medication(s), management and follow-up based on response reviewed at visit. 2.  Change dosing to 20mg BID--she will split remainder of 90-day script for 40mg lisinopril as reviewed. She had dizziness/light-headedness in AM with 40mg nightly dosing. Reviewed dosing and above lisinopril written as no-print script for documentation. 3.  Refill reviewed. Follow-up and Dispositions    · Return in about 3 weeks (around 7/28/2020) for Blood Pressure follow-up--in office visit.       lab results and schedule of future lab studies reviewed with patient  reviewed medications and side effects in detail    For additional documentation of information and/or recommendations discussed this visit, please see notes in instructions. Plan and evaluation (above) reviewed with pt at visit  Patient voiced understanding of plan and provided with time to ask/review questions. After Visit Summary (AVS) provided to pt after visit with additional instructions as needed/reviewed. AVS:  [x]  Sent to patient as [employer] message after visit. []  Mailed to patient after visit. []  Not sent to patient after visit. No future appointments.

## 2020-07-07 NOTE — PATIENT INSTRUCTIONS
1.  Please have the insurance form (for home BP monitoring) records faxed to [phone], as reviewed/requested. 2.  If you need assistance with activating [employer], or have other [employer] questions, you can call [phone]. 
 
 
3.  To make sure your home cuff is accurate: 
  
Check your BP and heart rate. Compare these values with a validated monitor (grocery store, pharmacy, manual cuff with medical provider/clinic), to be sure the home cuff readings are accurate. To be accurate, recommend that the SBP's (systolic BP's or top numbers) be within 10 points of each other, and the DBP's (diastolic BP's or bottom numbers) to be within 5 points of each other. If your home meter is accurate, keep a log as reviewed, and will review here at your BP follow-up visit. If you note values >135/85 (either number) consistently, please return sooner to review. 4.  Please call the [facility] to set up an appointment--you can do this directly, without a referral.  Their number is [phone]. OR [facility] also has a program with [employer] a Dietitian\". You can have a complimentary conversation by calling [employer] [facility] at [phone]. --When prompted, enter code [[phone]]. --They are available Monday through Friday 9AM to 5PM EST. You can discuss nutrition topics below with them: 
--general diet and nutrition,  
--help manage blood sugar and weight. If problems, please call our clinic Nurses for assistance or alternative resources.

## 2020-07-07 NOTE — PROGRESS NOTES
Virtual Visit     Patient c/o not being able to sleep,would like to have Melatonin prescribed. Chief Complaint   Patient presents with    Labs     review    Blood Pressure Check     Patient states Lisinopril 40 mg is too strong, has returned to taking 20 mg but would like to know if this is ok. 1. Have you been to the ER, urgent care clinic since your last visit? Hospitalized since your last visit? No    2. Have you seen or consulted any other health care providers outside of the [address] [place] [address] since your last visit? Include any pap smears or colon screening. No     Health Maintenance Due   Topic Date Due    Hepatitis C Screening  1955    Foot Exam Q1  03/07/1967    Eye Exam Retinal or Dilated  03/07/1967    DTaP/Tdap/Td series (1 - Tdap) 03/07/1978    Shingrix Vaccine Age 50> (1 of 2) 03/07/2007    Breast Cancer Screen Mammogram  06/19/2014    PAP AKA CERVICAL CYTOLOGY  07/18/2019    Colonoscopy  12/28/2019     Abuse Screening Questionnaire 7/7/2020   Do you ever feel afraid of your partner? N   Are you in a relationship with someone who physically or mentally threatens you? N   Is it safe for you to go home?  Y     3 most recent PHQ Screens 7/7/2020   Little interest or pleasure in doing things Not at all   Feeling down, depressed, irritable, or hopeless Not at all   Total Score PHQ 2 0

## 2020-10-08 ENCOUNTER — APPOINTMENT (OUTPATIENT)
Dept: PHYSICAL THERAPY | Age: 65
End: 2020-10-08

## 2020-10-14 ENCOUNTER — HOSPITAL ENCOUNTER (OUTPATIENT)
Dept: PHYSICAL THERAPY | Age: 65
Discharge: HOME | End: 2020-10-14
Payer: MEDICAID

## 2020-10-14 PROCEDURE — 97161 PT EVAL LOW COMPLEX 20 MIN: CPT | Performed by: PHYSICAL THERAPIST

## 2020-10-14 NOTE — PROGRESS NOTES
PT INITIAL EVALUATION NOTE 2-15 Patient Name: Omega Gr Date:10/14/2020 : 1955 [x]  Patient  Verified Payor: /   
In time:11:53  Out [id number] Total Treatment Time (min): 52 Visit #: 1 Treatment Area: Cervicalgia [M54.2] SUBJECTIVE Pain Level (0-10 scale): 5 Any medication changes, allergies to medications, adverse drug reactions, diagnosis change, or new procedure performed?: [] No    [x] Yes (see summary sheet for update) Subjective:    
Pt reports pain had insidious onset was 6 months ago. Pain is worse at night when laying on both shoulders. Reaching overhead and behind to complete self care is painful. Heat makes it better and pressure makes it worse. Very active with yard work and outdoor activities. 5/10 aching pain, 4-5/10 at best, 10/10. advil and tylenol provide relief. Sleeps with 2 pillows, tried pillows between the legs. Having trouble sleeping. Currently on diabetes management exercise and diet program, walking 4x/week for about 3/4 mile and Oralia 4x/week. Pt goal: be able to perform pericare with less pain and perform ADL's. OBJECTIVE/EXAMINATION 
ROM:   
 
Shoulder AROM  
flexion:151 Functional ER/flexion: R: C2 L:occiput Functional IR/add R+L: T12/L1 Thoracic rotation: L>R both limited to <45 degrees MMT 
ER 4/5 bilaterally Modality rationale: decrease inflammation and decrease pain to improve the patients ability to tolerate all activity Min Type Additional Details  
 [] Estim: []Att   []Unatt        []TENS instruct []IFC  []Premod   []NMES []Other:  []w/US   []w/ice   []w/heat Position: Location:  
 []  Traction: [] Cervical       []Lumbar 
                     [] Prone          []Supine []Intermittent   []Continuous Lbs: 
[] before manual 
[] after manual 
[]w/heat  
 []  Ultrasound: []Continuous   [] Pulsed at:  
                         []1MHz   []3MHz Location: 
W/cm2: []  Paraffin Location: 
[]w/heat  
10 []  Ice     [x]  Heat 
[]  Ice massage Position:Supine Location: Upper back/shoulder  
 []  Laser 
[]  Other: Position: Location:  
 []  Vasopneumatic Device Pressure:       [] lo [] med [] hi  
Temperature:   
[x] Skin assessment post-treatment:  [x]intact []redness- no adverse reaction 
  []redness  adverse reaction:  
 
With 
 [] TE 
 [] TA 
 [] neuro 
 [] other: Patient Education: [] Review HEP [] Progressed/Changed HEP based on:  
[] positioning   [] body mechanics   [] transfers   [] heat/ice application   
[] other: Non-pharmacologic pain management techniques, importance of activity and mobility, expected treatment course Other Objective/Functional Measures:FOTO 65 Pain Level (0-10 scale) post treatment: 5 
 
 
ASSESSMENT:  
  
[x]  See Plan of Care Shital Rosenbaum, PT 10/14/2020

## 2020-10-15 NOTE — PROGRESS NOTES
PT INITIAL EVALUATION NOTE 2-15    Patient Name: Flores An  Date:10/15/2020  : 1955  [x]  Patient  Verified  Payor: [address] / Plan: [facility] / Product Type: Managed Care Medicaid /    In time:11:53  Out time: 12:45  Total Treatment Time (min): 52  Visit #: 1     Treatment Area: Cervicalgia [M54.2]    SUBJECTIVE  Pain Level (0-10 scale): 5  Any medication changes, allergies to medications, adverse drug reactions, diagnosis change, or new procedure performed?: [] No    [x] Yes (see summary sheet for update)  Subjective:     Pt presents with chief complaints of bilateral shoulder pain and stiffness with insidious onset approximately 6 months ago. She reports her pain is worse at night when laying on her shoulders. Reaching overhead and behind to complete self care is painful. Heat makes it better and pressure makes it worse. Her prior level of function was very active with yard work and outdoor activities. She describes her pain as 5/10 aching pain, 4-5/10 at best, 10/10 at the worst and has been taking advil and tylenol for relief. She now sleeps with 2 pillows and has tried pillows between her legs with some mild relief with the additional support. She is still having trouble sleeping because she can't find a comfortable position. Currently on diabetes management exercise and diet program, walking 4x/week for about 3/4 mile and Oralia 4x/week. Pt goal: be able to perform pericare and ADL's independently with less pain.     OBJECTIVE/EXAMINATION  ROM:      AROM shoulder flexion: 151 degrees bilaterally    Functional ER/flexion:    R: C2    L:occiput  Functional IR/add    R+L: T12/L1  Thoracic rotation:    L>R, both limited to <45 degrees    MMT:   Shoulder ER: 4/5 bilaterally   Shoulder IR: 5/5 bilaterally   Shoulder Flexion: 5/5 bilaterally    Special Tests:   Kermit: + bilaterally   Speed's: -   Full can: -   Empty can: -     Joint Assessment:   T-spine: hypomobile   Glenohumeral: Posterior stiffness bilaterally    Soft tissue:   Noted turgor in thoracic paraspinals     Modality rationale: decrease inflammation, decrease pain, increase tissue extensibility and increase muscle contraction/control to improve the patients ability to tolerate ADL's with decreased pain and discomfort.    Min Type Additional Details    [] Estim: []Att   []Unatt        []TENS instruct                  []IFC  []Premod   []NMES                     []Other:  []w/US   []w/ice   []w/heat  Position:  Location:    []  Traction: [] Cervical       []Lumbar                       [] Prone          []Supine                       []Intermittent   []Continuous Lbs:  [] before manual  [] after manual  []w/heat    []  Ultrasound: []Continuous   [] Pulsed at:                            []1MHz   []3MHz Location:  W/cm2:    []  Paraffin         Location:  []w/heat   10 []  Ice     [x]  Heat  []  Ice massage Position: Supine  Location: Bilateral shoulders    []  Laser  []  Other: Position:  Location:    []  Vasopneumatic Device Pressure:       [] lo [] med [] hi   Temperature:    [x] Skin assessment post-treatment:  [x]intact []redness- no adverse reaction    []redness - adverse reaction:       With   [] TE   [] TA   [] neuro   [] other: Patient Education: [x] Review HEP    [] Progressed/Changed HEP based on:   [] positioning   [] body mechanics   [] transfers   [] heat/ice application    [] other:        Other Objective/Functional Measures:FOTO - 65    Pain Level (0-10 scale) post treatment: 5      ASSESSMENT:      [x]  See Plan of Care      Carlton Vasquez 10/15/2020

## 2020-10-20 ENCOUNTER — HOSPITAL ENCOUNTER (OUTPATIENT)
Dept: PHYSICAL THERAPY | Age: 65
Discharge: HOME | End: 2020-10-20
Payer: MEDICAID

## 2020-10-20 PROCEDURE — 97110 THERAPEUTIC EXERCISES: CPT | Performed by: PHYSICAL THERAPIST

## 2020-10-20 PROCEDURE — 97140 MANUAL THERAPY 1/> REGIONS: CPT | Performed by: PHYSICAL THERAPIST

## 2020-10-20 NOTE — PROGRESS NOTES
PT INITIAL EVALUATION NOTE 2-15 Patient Name: Wei Richards Date:10/20/2020 : 1955 [x]  Patient  Verified Payor: Indy Choi Alicia / Plan: Leandro Carrillo / Product Type: Managed Care Medicaid / In time:1153  Out time:1245 Total Treatment Time (min): 52 Visit #: 1 Treatment Area: Cervicalgia [M54.2] SUBJECTIVE Pain Level (0-10 scale): 5 Any medication changes, allergies to medications, adverse drug reactions, diagnosis change, or new procedure performed?: [] No    [x] Yes (see summary sheet for update) Subjective: Pt presents with chief complaints of bilateral shoulder pain and stiffness with insidious onset approximately 6 months ago. She reports her pain is worse at night when laying on her shoulders. Reaching overhead and behind to complete self care is painful. Heat makes it better and pressure makes it worse. Her prior level of function was very active with yard work and outdoor activities. She describes her pain as 5/10 aching pain, 4-5/10 at best, 10/10 at the worst and has been taking advil and tylenol for relief. She now sleeps with 2 pillows and has tried pillows between her legs with some mild relief with the additional support. She is still having trouble sleeping because she can't find a comfortable position. Currently on diabetes management exercise and diet program, walking 4x/week for about 3/4 mile and Oralia 4x/week. Pt goal: be able to perform pericare and ADL's independently with less pain. OBJECTIVE/EXAMINATION 
ROM:   
 
AROM shoulder flexion: 151 degrees bilaterally Functional ER/flexion:  
 R: C2  
 L:occiput Functional IR/add R+L: T12/L1 Thoracic rotation:  
 L>R, both limited to <45 degrees MMT: 
 Shoulder ER: 4/5 bilaterally Shoulder IR: 5/5 bilaterally Shoulder Flexion: 5/5 bilaterally Special Tests: 
 Kermit: + bilaterally Speed's: - 
 Full can: - 
 Empty can: -  
 
Joint Assessment: T-spine: hypomobile Glenohumeral: Posterior stiffness bilaterally Soft tissue: 
 Noted turgor in thoracic paraspinals Modality rationale: decrease inflammation, decrease pain, increase tissue extensibility and increase muscle contraction/control to improve the patients ability to tolerate ADL's with decreased pain and discomfort. Min Type Additional Details  
 [] Estim: []Att   []Unatt        []TENS instruct []IFC  []Premod   []NMES []Other:  []w/US   []w/ice   []w/heat Position: Location:  
 []  Traction: [] Cervical       []Lumbar 
                     [] Prone          []Supine []Intermittent   []Continuous Lbs: 
[] before manual 
[] after manual 
[]w/heat  
 []  Ultrasound: []Continuous   [] Pulsed at:  
                         []1MHz   []3MHz Location: 
W/cm2:  
 []  Paraffin Location: 
[]w/heat  
10 []  Ice     [x]  Heat 
[]  Ice massage Position: Supine Location: Bilateral shoulders  
 []  Laser 
[]  Other: Position: Location:  
 []  Vasopneumatic Device Pressure:       [] lo [] med [] hi  
Temperature:   
[x] Skin assessment post-treatment:  [x]intact []redness- no adverse reaction 
  []redness  adverse reaction:  
 
 
With 
 [] TE 
 [] TA 
 [] neuro 
 [] other: Patient Education: [x] Review HEP [] Progressed/Changed HEP based on:  
[] positioning   [] body mechanics   [] transfers   [] heat/ice application   
[] other:   
 
 
Other Objective/Functional Measures:FOTO - 65 Pain Level (0-10 scale) post treatment: 5 
 
 
ASSESSMENT:  
  
[x]  See Plan of Care Shital Rosenbaum, PT 10/20/2020
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
## 2020-10-20 NOTE — PROGRESS NOTES
PT DAILY TREATMENT NOTE 2-15    Patient Name: Yoly Camacho  Date:10/20/2020  : 1955  [x]  Patient  Verified  Payor: [address] / Plan: [address] [facility] / Product Type: Managed Care Medicaid /    In time:9:07  Out time:10:21  Total Treatment Time (min): 74  Visit #: 2 Visit count could not be calculated. Make sure you are using a visit which is associated with an episode. Treatment Area: Cervicalgia [M54.2]    SUBJECTIVE  Pain Level (0-10 scale): 0  Any medication changes, allergies to medications, adverse drug reactions, diagnosis change, or new procedure performed?: [x] No    [] Yes (see summary sheet for update)  Subjective functional status/changes:   [] No changes reported  Pt reports noticing some nerve related symptoms of numbness and tingling in  1st, 2nd and 3rd distal phalanges along a median nerve distribution periodically over the previous several days after paying more attention following initial evaluation. She cannot recall precipitating events or aggravating factors due to high level of activity. OBJECTIVE    Modality rationale: decrease edema, decrease inflammation, decrease pain and increase tissue extensibility to improve the patients ability to tolerate activity with decreased pain.    Min Type Additional Details       [] Estim: []Att   []Unatt    []TENS instruct                  []IFC  []Premod   []NMES                     []Other:  []w/US   []w/ice   []w/heat  Position:  Location:       []  Traction: [] Cervical       []Lumbar                       [] Prone          []Supine                       []Intermittent   []Continuous Lbs:  [] before manual  [] after manual  []w/heat    []  Ultrasound: []Continuous   [] Pulsed                       at: []1MHz   []3MHz Location:  W/cm2:    [] Paraffin         Location:   []w/heat   10 []  Ice     [x]  Heat  []  Ice massage Position: Hook lying  Location: cervical    []  Laser  []  Other: Position:  Location:      [] Vasopneumatic Device Pressure:       [] lo [] med [] hi   Temperature:      [x] Skin assessment post-treatment:  [x]intact []redness- no adverse reaction    []redness - adverse reaction:         44 min Therapeutic Exercise:  [x] See flow sheet :   Rationale: increase ROM, increase strength and improve coordination to improve the patients ability to tolerate activity with increased functional capacity and decreased symptoms    15 min Manual Therapy:  Cervical trigger point release, cervical distraction, subscapular trigger point release, glenhoumeral mobilization   Rationale: decrease pain, increase ROM, increase tissue extensibility, decrease edema  and decrease trigger points  to improve the patients ability to tolerate activity with increased functional capacity and decreased pain. With   [x] TE   [] TA   [] neuro   [] other: Patient Education: [x] Review HEP    [x] Progressed/Changed HEP based on:   [x] positioning   [x] body mechanics   [] transfers   [x] heat/ice application    [] other:      Other Objective/Functional Measures: None noted     Pain Level (0-10 scale) post treatment: 0    ASSESSMENT/Changes in Function:   Pt demonstrated restrictions in shoulder abduction and ER that limited her ability to tolerate a doorway stretch. Following manual therapy pt's increased ROM allowed her to tolerate the doorway position unilaterally without undue discomfort. Pt required extensive visual, verbal and tactile cuing for proper execution of shoulder ER and Rows with good carryover to subsequent trials. Patient will continue to benefit from skilled PT services to modify and progress therapeutic interventions, address functional mobility deficits, address ROM deficits, address strength deficits, analyze and address soft tissue restrictions, analyze and cue movement patterns, analyze and modify body mechanics/ergonomics and assess and modify postural abnormalities to attain remaining goals.      [x]  See Plan of Care  []  See progress note/recertification  []  See Discharge Summary         Progress towards goals / Updated goals:  Pt progressing toward goals and demonstrating improved ROM and pain ratings.     PLAN  [x]  Upgrade activities as tolerated     [x]  Continue plan of care  [x]  Update interventions per flow sheet       []  Discharge due to:_  []  Other:_      Napoleon Palencia, PT 10/20/2020

## 2020-10-22 ENCOUNTER — HOSPITAL ENCOUNTER (OUTPATIENT)
Dept: PHYSICAL THERAPY | Age: 65
Discharge: HOME | End: 2020-10-22
Payer: MEDICAID

## 2020-10-22 PROCEDURE — 97110 THERAPEUTIC EXERCISES: CPT | Performed by: PHYSICAL THERAPIST

## 2020-10-22 PROCEDURE — 97140 MANUAL THERAPY 1/> REGIONS: CPT | Performed by: PHYSICAL THERAPIST

## 2020-10-22 NOTE — PROGRESS NOTES
PT DAILY TREATMENT NOTE 2-15    Patient Name: Dione Franklin  Date:10/22/2020  : 1955  [x]  Patient  Verified  Payor: Humberto Harrison / Plan: [address] [place] [address] / Product Type: Managed Care Medicaid /    In time:1120 Out time:1223  Total Treatment Time (min): 63  Visit #: 2 3    Treatment Area: Cervicalgia [M54.2]    SUBJECTIVE  Pain Level (0-10 scale): 0  Any medication changes, allergies to medications, adverse drug reactions, diagnosis change, or new procedure performed?: [x] No    [] Yes (see summary sheet for update)  Subjective functional status/changes:   [] No changes reported  Pt reports that she is feeling better after last visit but is still sore in the muscles she indicates the anterior shoulder    OBJECTIVE    Modality rationale: decrease edema, decrease inflammation, decrease pain and increase tissue extensibility to improve the patients ability to tolerate activity with decreased pain.    Min Type Additional Details       [] Estim: []Att   []Unatt    []TENS instruct                  []IFC  []Premod   []NMES                     []Other:  []w/US   []w/ice   []w/heat  Position:  Location:       []  Traction: [] Cervical       []Lumbar                       [] Prone          []Supine                       []Intermittent   []Continuous Lbs:  [] before manual  [] after manual  []w/heat    []  Ultrasound: []Continuous   [] Pulsed                       at: []1MHz   []3MHz Location:  W/cm2:    [] Paraffin         Location:   []w/heat   10 [x]  Ice     []  Heat  []  Ice massage Position: Hook lying  Location: cervical, bilat shoulders    []  Laser  []  Other: Position:  Location:      []  Vasopneumatic Device Pressure:       [] lo [] med [] hi   Temperature:      [x] Skin assessment post-treatment:  [x]intact []redness- no adverse reaction    []redness - adverse reaction:         40 min Therapeutic Exercise:  [x] See flow sheet :   Rationale: increase ROM, increase strength and improve coordination to improve the patients ability to tolerate activity with increased functional capacity and decreased symptoms    13 min Manual Therapy:  STM and MFR to UT and levator, grade 2-3 GH mobs ing and post, ST upward rotation, STM /MFR to subscap and teres group along the posterior wall of the axilla   Rationale: decrease pain, increase ROM, increase tissue extensibility, decrease edema  and decrease trigger points  to improve the patients ability to tolerate activity with increased functional capacity and decreased pain. With   [x] TE   [] TA   [] neuro   [] other: Patient Education: [x] Review HEP    [x] Progressed/Changed HEP based on:   [x] positioning   [x] body mechanics   [] transfers   [x] heat/ice application    [] other:      Other Objective/Functional Measures: None noted     Pain Level (0-10 scale) post treatment: 0    ASSESSMENT/Changes in Function:   Pt continues to have tightness in the posterior shoulder specifically the subscap and lats lats. This tendons are pulling her into IR and she is struggling to get into proper posture. Have given her verbal and tactile cues to set the shoulder blade. Initiated SA punches to improve scap position and decreased medial border winging to assist in decreasing positional impingement pain. Will continue to work to increase cuff strength as well   Patient will continue to benefit from skilled PT services to modify and progress therapeutic interventions, address functional mobility deficits, address ROM deficits, address strength deficits, analyze and address soft tissue restrictions, analyze and cue movement patterns, analyze and modify body mechanics/ergonomics and assess and modify postural abnormalities to attain remaining goals.      [x]  See Plan of Care  []  See progress note/recertification  []  See Discharge Summary         Progress towards goals / Updated goals:  Pt progressing toward goals and demonstrating improved ROM and pain ratings.     PLAN  [x]  Upgrade activities as tolerated     [x]  Continue plan of care  [x]  Update interventions per flow sheet       []  Discharge due to:_  []  Other:_      Lex Ambrosio, PT 10/22/2020

## 2020-10-27 ENCOUNTER — APPOINTMENT (OUTPATIENT)
Dept: PHYSICAL THERAPY | Age: 65
End: 2020-10-27
Payer: MEDICAID

## 2020-10-29 ENCOUNTER — HOSPITAL ENCOUNTER (OUTPATIENT)
Dept: PHYSICAL THERAPY | Age: 65
Discharge: HOME | End: 2020-10-29
Payer: MEDICAID

## 2020-10-29 PROCEDURE — 97110 THERAPEUTIC EXERCISES: CPT | Performed by: PHYSICAL THERAPIST

## 2020-10-29 NOTE — PROGRESS NOTES
PT DAILY TREATMENT NOTE 2-15    Patient Name: Evelia Kahn  Date:10/29/2020  : 1955  [x]  Patient  Verified  Payor: Indy Vargas / Plan: [address] [facility] / Product Type: Managed Care Medicaid /    In time:11:21  Out time:12:01  Total Treatment Time (min): 40   Visit #:  4    Treatment Area: Cervicalgia [M54.2]    SUBJECTIVE  Pain Level (0-10 scale): 0  Any medication changes, allergies to medications, adverse drug reactions, diagnosis change, or new procedure performed?: [x] No    [] Yes (see summary sheet for update)  Subjective functional status/changes:   [] No changes reported  Pt reports improvement in symptoms with no residual pain and minimal stiffness. She was able to care for sick family and young grandchildren without feeling hindered by pain. OBJECTIVE      40 min Therapeutic Exercise:  [x] See flow sheet :   Rationale: increase ROM, increase strength, improve coordination and increase proprioception to improve the patients ability to tolerate ADL's and activity with decreased pain          With   [x] TE   [] TA   [] neuro   [] other: Patient Education: [x] Review HEP    [x] Progressed/Changed HEP based on:   [x] positioning   [x] body mechanics   [] transfers   [] heat/ice application    [] other:      Other Objective/Functional Measures:  None noted     Pain Level (0-10 scale) post treatment: 0    ASSESSMENT/Changes in Function:   Pt's pain is subjectively better today than on initial visit and patient has reported consistently improved pain ratings over previous 3 visits. She's been able to maintain a high level of activity with her family and has not re-aggravated her symptoms.   Patient will continue to benefit from skilled PT services to modify and progress therapeutic interventions, address functional mobility deficits, address ROM deficits, address strength deficits, analyze and address soft tissue restrictions, analyze and cue movement patterns, analyze and modify body mechanics/ergonomics and assess and modify postural abnormalities to attain remaining goals. [x]  See Plan of Care  []  See progress note/recertification  []  See Discharge Summary         Progress towards goals / Updated goals:  Pt progressing toward goals and improving pain ratings consistently.     PLAN  [x]  Upgrade activities as tolerated     [x]  Continue plan of care  [x]  Update interventions per flow sheet       []  Discharge due to:_  []  Other:Janak Mcghee 10/29/2020

## 2020-11-03 ENCOUNTER — HOSPITAL ENCOUNTER (OUTPATIENT)
Dept: PHYSICAL THERAPY | Age: 65
Discharge: HOME | End: 2020-11-03
Payer: MEDICAID

## 2020-11-03 PROCEDURE — 97110 THERAPEUTIC EXERCISES: CPT | Performed by: PHYSICAL THERAPIST

## 2020-11-03 NOTE — PROGRESS NOTES
PT DAILY TREATMENT NOTE 2-15    Patient Name: Opal Yuan  Date:11/3/2020  : 1955  [x]  Patient  Verified  Payor: [address] / Plan: [address] [facility] / Product Type: Managed Care Medicaid /    In time:9:06  Out time:9:55  Total Treatment Time (min): 49  Visit #:  5    Treatment Area: Cervicalgia [M54.2]    SUBJECTIVE  Pain Level (0-10 scale): Any medication changes, allergies to medications, adverse drug reactions, diagnosis change, or new procedure performed?: [x] No    [] Yes (see summary sheet for update)  Subjective functional status/changes:   [] No changes reported  Pt reports digging tree stumps out of her yard yesterday. She is more symptomatic today following that, however still plans on being able to return to removing stumps this evening. OBJECTIVE    Modality rationale: decrease edema, decrease inflammation, decrease pain, increase tissue extensibility and increase muscle contraction/control to improve the patients ability to tolerate leisure activities with decreased pain.    Min Type Additional Details       [] Estim: []Att   []Unatt    []TENS instruct                  []IFC  []Premod   []NMES                     []Other:  []w/US   []w/ice   []w/heat  Position:  Location:       []  Traction: [] Cervical       []Lumbar                       [] Prone          []Supine                       []Intermittent   []Continuous Lbs:  [] before manual  [] after manual  []w/heat    []  Ultrasound: []Continuous   [] Pulsed                       at: []1MHz   []3MHz Location:  W/cm2:    [] Paraffin         Location:   []w/heat   10 []  Ice     [x]  Heat  []  Ice massage Position: Seated  Location: Thoracic/shoulders    []  Laser  []  Other: Position:  Location:      []  Vasopneumatic Device Pressure:       [] lo [] med [] hi   Temperature:      [x] Skin assessment post-treatment:  [x]intact []redness- no adverse reaction    []redness - adverse reaction:         39 min Therapeutic Exercise:  [x] See flow sheet :   Rationale: increase ROM, increase strength, improve coordination, improve balance and increase proprioception to improve the patients ability to tolerate leisure activities and perform ADL's with decreased pain and increased functional capacity. With   [x] TE   [] TA   [] neuro   [] other: Patient Education: [x] Review HEP    [x] Progressed/Changed HEP based on:   [x] positioning   [x] body mechanics   [] transfers   [] heat/ice application    [] other:      Other Objective/Functional Measures: None noted    Pain Level (0-10 scale) post treatment: 3    ASSESSMENT/Changes in Function:   Pt able to tolerate manual labor however did experience flare up in symptoms in the hours following. Her symptoms are not significant enough to preclude her from finishing the yard work tonight indicating increased activity tolerance even in the presence of some discomfort. Today we reviewed lifting mechanics to limit spinal and upper back stress to allow her to continue yard work with decreased symptoms. Patient will continue to benefit from skilled PT services to modify and progress therapeutic interventions, address functional mobility deficits, address ROM deficits, address strength deficits, analyze and address soft tissue restrictions, analyze and cue movement patterns, analyze and modify body mechanics/ergonomics, assess and modify postural abnormalities and instruct in home and community integration to attain remaining goals. [x]  See Plan of Care  []  See progress note/recertification  []  See Discharge Summary         Progress towards goals / Updated goals:  Patient progressing toward goals and tolerating increased activity with limited increase in symptoms.     PLAN  [x]  Upgrade activities as tolerated     [x]  Continue plan of care  [x]  Update interventions per flow sheet       []  Discharge due to:_  []  Other:_      Haim Lizama, PT 11/3/2020

## 2020-11-05 ENCOUNTER — HOSPITAL ENCOUNTER (OUTPATIENT)
Dept: PHYSICAL THERAPY | Age: 65
Discharge: HOME | End: 2020-11-05
Payer: MEDICAID

## 2020-11-05 PROCEDURE — 97110 THERAPEUTIC EXERCISES: CPT | Performed by: PHYSICAL THERAPIST

## 2020-11-05 NOTE — PROGRESS NOTES
PT DAILY TREATMENT NOTE 2-15    Patient Name: Angus Cho  Date:2020  : 1955  [x]  Patient  Verified  Payor: [address] [place] [address] Ave / Plan: [address] [facility] / Product Type: Managed Care Medicaid /    In time:11:18  Out time:1213  Total Treatment Time (min): 55  Visit #:  6    Treatment Area: Cervicalgia [M54.2]    SUBJECTIVE  Pain Level (0-10 scale): 3  Any medication changes, allergies to medications, adverse drug reactions, diagnosis change, or new procedure performed?: [x] No    [] Yes (see summary sheet for update)  Subjective functional status/changes:   [] No changes reported  Pt reports mild low back pain on arrival today after yard work over past several days. Has started new [employer] project as well.     OBJECTIVE    Modality rationale: decrease pain and increase tissue extensibility to improve the patients ability to tolerate all activity   Min Type Additional Details       [] Estim: []Att   []Unatt    []TENS instruct                  []IFC  []Premod   []NMES                     []Other:  []w/US   []w/ice   []w/heat  Position:  Location:       []  Traction: [] Cervical       []Lumbar                       [] Prone          []Supine                       []Intermittent   []Continuous Lbs:  [] before manual  [] after manual  []w/heat    []  Ultrasound: []Continuous   [] Pulsed                       at: []1MHz   []3MHz Location:  W/cm2:    [] Paraffin         Location:   []w/heat   15 []  Ice     [x]  Heat  []  Ice massage Position:supine  Location:low back    []  Laser  []  Other: Position:  Location:      []  Vasopneumatic Device Pressure:       [] lo [] med [] hi   Temperature:      [x] Skin assessment post-treatment:  [x]intact [x]redness- no adverse reaction    []redness - adverse reaction:         40 min Therapeutic Exercise:  [x] See flow sheet :   Rationale: increase ROM, increase strength, improve coordination, improve balance and increase proprioception to improve the patients ability to tolerate activity and home care duties with decreased discomfort and pain. With   [x] TE   [] TA   [] neuro   [] other: Patient Education: [x] Review HEP    [x] Progressed/Changed HEP based on:   [x] positioning   [x] body mechanics   [] transfers   [] heat/ice application    [] other:      Other Objective/Functional Measures: None noted     Pain Level (0-10 scale) post treatment: 3    ASSESSMENT/Changes in Function:   Pt able to progress her exercises and maintain an active home care routine without aggravating symptoms beyond a 3/10. During lifting and carrying tasks during today's session she experienced increasing low back pain on later trials even with cuing for bracing and glute engagement with 25 pound weight. Future interventions to include core engagement training to improve ability to organize core and trunk musculature and improve upper and lower extremity symptoms. Patient will continue to benefit from skilled PT services to modify and progress therapeutic interventions, address functional mobility deficits, address ROM deficits, address strength deficits, analyze and address soft tissue restrictions, analyze and cue movement patterns, analyze and modify body mechanics/ergonomics, assess and modify postural abnormalities and address imbalance/dizziness to attain remaining goals. [x]  See Plan of Care  []  See progress note/recertification  []  See Discharge Summary         Progress towards goals / Updated goals:  Pt progressing towards goals and tolerating progression of exercises and increased home activity without increased shoulder/neck symptoms.     PLAN  [x]  Upgrade activities as tolerated     [x]  Continue plan of care  [x]  Update interventions per flow sheet       []  Discharge due to:_  []  Other:_      Alivia Childress, PT 11/5/2020

## 2020-11-11 ENCOUNTER — HOSPITAL ENCOUNTER (OUTPATIENT)
Dept: PHYSICAL THERAPY | Age: 65
Discharge: HOME | End: 2020-11-11
Payer: MEDICAID

## 2020-11-11 PROCEDURE — 97140 MANUAL THERAPY 1/> REGIONS: CPT | Performed by: PHYSICAL THERAPIST

## 2020-11-11 PROCEDURE — 97110 THERAPEUTIC EXERCISES: CPT | Performed by: PHYSICAL THERAPIST

## 2020-11-11 NOTE — PROGRESS NOTES
PT DAILY TREATMENT NOTE 2-15    Patient Name: Víctor Oh  Date:2020  : 1955  [x]  Patient  Verified  Payor: [address] / Plan: Monica Slice / Product Type: Managed Care Medicaid /    In time:1243 Out time:153  Total Treatment Time (min): 70  Visit #:  7    Treatment Area: Cervicalgia [M54.2]    SUBJECTIVE  Pain Level (0-10 scale): 5  Any medication changes, allergies to medications, adverse drug reactions, diagnosis change, or new procedure performed?: [x] No    [] Yes (see summary sheet for update)  Subjective functional status/changes:   [] No changes reported  Pt reports that she is still doing yard work and is still sore, but states that she realizes that better posture is helping  OBJECTIVE    Modality rationale: decrease pain and increase tissue extensibility to improve the patients ability to tolerate all activity   Min Type Additional Details       [] Estim: []Att   []Unatt    []TENS instruct                  []IFC  []Premod   []NMES                     []Other:  []w/US   []w/ice   []w/heat  Position:  Location:       []  Traction: [] Cervical       []Lumbar                       [] Prone          []Supine                       []Intermittent   []Continuous Lbs:  [] before manual  [] after manual  []w/heat    []  Ultrasound: []Continuous   [] Pulsed                       at: []1MHz   []3MHz Location:  W/cm2:    [] Paraffin         Location:   []w/heat   10 []  Ice     [x]  Heat  []  Ice massage Position:supine  Location:low back    []  Laser  []  Other: Position:  Location:      []  Vasopneumatic Device Pressure:       [] lo [] med [] hi   Temperature:      [x] Skin assessment post-treatment:  [x]intact [x]redness- no adverse reaction    []redness - adverse reaction:       15 min manual therapy: PROM with grade III-IV GH mobs inf and post with ST upward glides   Rationale: Increase ROM, increase strength, posture, and mobility to improve patients ability to complete all activity. 45 min Therapeutic Exercise:  [x] See flow sheet :   Rationale: increase ROM, increase strength, improve coordination, improve balance and increase proprioception to improve the patients ability to tolerate activity and home care duties with decreased discomfort and pain. With   [x] TE   [] TA   [] neuro   [] other: Patient Education: [x] Review HEP    [x] Progressed/Changed HEP based on:   [x] positioning   [x] body mechanics   [] transfers   [] heat/ice application    [] other:      Other Objective/Functional Measures: None noted     Pain Level (0-10 scale) post treatment: 3    ASSESSMENT/Changes in Function:   Pt is tolerating all therex well and only complains of fatigue in the cuff musculature today. She demonstrated improved posture with activity and is complaining of less pain following treatment. Have encouraged her not to over do it with her household chores and yard work  Patient will continue to benefit from skilled PT services to modify and progress therapeutic interventions, address functional mobility deficits, address ROM deficits, address strength deficits, analyze and address soft tissue restrictions, analyze and cue movement patterns, analyze and modify body mechanics/ergonomics, assess and modify postural abnormalities and address imbalance/dizziness to attain remaining goals. [x]  See Plan of Care  []  See progress note/recertification  []  See Discharge Summary         Progress towards goals / Updated goals:  Pt progressing towards goals and tolerating progression of exercises and increased home activity without increased shoulder/neck symptoms.     PLAN  [x]  Upgrade activities as tolerated     [x]  Continue plan of care  [x]  Update interventions per flow sheet       []  Discharge due to:_  []  Other:_      Alivia Childress, PT 11/11/2020

## 2020-11-16 ENCOUNTER — HOSPITAL ENCOUNTER (OUTPATIENT)
Dept: PHYSICAL THERAPY | Age: 65
Discharge: HOME | End: 2020-11-16
Payer: MEDICAID

## 2020-11-16 PROCEDURE — 97140 MANUAL THERAPY 1/> REGIONS: CPT | Performed by: PHYSICAL THERAPIST

## 2020-11-16 PROCEDURE — 97110 THERAPEUTIC EXERCISES: CPT | Performed by: PHYSICAL THERAPIST

## 2020-11-16 NOTE — PROGRESS NOTES
PT DAILY TREATMENT NOTE 2-15    Patient Name: Mark Zendejas  Date:2020  : 1955  [x]  Patient  Verified  Payor: [address] / Plan: [address] [facility] / Product Type: Managed Care Medicaid /    In time:143 Out time:255  Total Treatment Time (min): 72  Visit #:  8    Treatment Area: Cervicalgia [M54.2]    SUBJECTIVE  Pain Level (0-10 scale): 3  Any medication changes, allergies to medications, adverse drug reactions, diagnosis change, or new procedure performed?: [x] No    [] Yes (see summary sheet for update)  Subjective functional status/changes:   [] No changes reported  Pt reports that she is a little sore after spending the night in a treehouse with her grandson's. Otherwise she is doing ok  OBJECTIVE    Modality rationale: decrease pain and increase tissue extensibility to improve the patients ability to tolerate all activity   Min Type Additional Details       [] Estim: []Att   []Unatt    []TENS instruct                  []IFC  []Premod   []NMES                     []Other:  []w/US   []w/ice   []w/heat  Position:  Location:       []  Traction: [] Cervical       []Lumbar                       [] Prone          []Supine                       []Intermittent   []Continuous Lbs:  [] before manual  [] after manual  []w/heat    []  Ultrasound: []Continuous   [] Pulsed                       at: []1MHz   []3MHz Location:  W/cm2:    [] Paraffin         Location:   []w/heat   10 []  Ice     [x]  Heat  []  Ice massage Position:supine  Location:low back    []  Laser  []  Other: Position:  Location:      []  Vasopneumatic Device Pressure:       [] lo [] med [] hi   Temperature:      [x] Skin assessment post-treatment:  [x]intact [x]redness- no adverse reaction    []redness - adverse reaction:       15 min manual therapy: PROM with grade III-IV GH mobs inf and post with ST upward glides, STM to post axilla along the lats and teres group.   Rationale: Increase ROM, increase strength, posture, and mobility to improve patients ability to complete all activity. 47 min Therapeutic Exercise:  [x] See flow sheet :   Rationale: increase ROM, increase strength, improve coordination, improve balance and increase proprioception to improve the patients ability to tolerate activity and home care duties with decreased discomfort and pain. With   [x] TE   [] TA   [] neuro   [] other: Patient Education: [x] Review HEP    [x] Progressed/Changed HEP based on:   [x] positioning   [x] body mechanics   [] transfers   [] heat/ice application    [] other:      Other Objective/Functional Measures: None noted     Pain Level (0-10 scale) post treatment: 3    ASSESSMENT/Changes in Function:   Pt is progressing nicely but still needs cues to set her scapulae. She does still have some anterior impingement type of pain, but seems to be improved following manual treatment. She also has increased turgor in the lats and teres group. Patient will continue to benefit from skilled PT services to modify and progress therapeutic interventions, address functional mobility deficits, address ROM deficits, address strength deficits, analyze and address soft tissue restrictions, analyze and cue movement patterns, analyze and modify body mechanics/ergonomics, assess and modify postural abnormalities and address imbalance/dizziness to attain remaining goals. [x]  See Plan of Care  []  See progress note/recertification  []  See Discharge Summary         Progress towards goals / Updated goals:  Pt progressing towards goals and tolerating progression of exercises and increased home activity without increased shoulder/neck symptoms.     PLAN  [x]  Upgrade activities as tolerated     [x]  Continue plan of care  [x]  Update interventions per flow sheet       []  Discharge due to:_  []  Other:_      Arleen Roldan, PT 11/16/2020

## 2020-11-19 ENCOUNTER — APPOINTMENT (OUTPATIENT)
Dept: PHYSICAL THERAPY | Age: 65
End: 2020-11-19
Payer: MEDICAID

## 2020-11-23 ENCOUNTER — HOSPITAL ENCOUNTER (OUTPATIENT)
Dept: PHYSICAL THERAPY | Age: 65
Discharge: HOME | End: 2020-11-23
Payer: MEDICAID

## 2020-11-23 ENCOUNTER — APPOINTMENT (OUTPATIENT)
Dept: PHYSICAL THERAPY | Age: 65
End: 2020-11-23
Payer: MEDICAID

## 2020-11-23 PROCEDURE — 97110 THERAPEUTIC EXERCISES: CPT | Performed by: PHYSICAL THERAPIST

## 2020-11-23 PROCEDURE — 97140 MANUAL THERAPY 1/> REGIONS: CPT | Performed by: PHYSICAL THERAPIST

## 2020-11-23 NOTE — PROGRESS NOTES
PT DAILY TREATMENT NOTE 2-15    Patient Name: Víctor Oh  Date:2020  : 1955  [x]  Patient  Verified  Payor: [address] / Plan: Monica Slice / Product Type: Managed Care Medicaid /    In time:136 Out time:239  Total Treatment Time (min): 63  Visit #:  9    Treatment Area: Cervicalgia [M54.2]    SUBJECTIVE  Pain Level (0-10 scale): 0  Any medication changes, allergies to medications, adverse drug reactions, diagnosis change, or new procedure performed?: [x] No    [] Yes (see summary sheet for update)  Subjective functional status/changes:   [x] No changes reported  Pt reports that she is feeling good today  OBJECTIVE    Modality rationale: decrease pain and increase tissue extensibility to improve the patients ability to tolerate all activity   Min Type Additional Details       [] Estim: []Att   []Unatt    []TENS instruct                  []IFC  []Premod   []NMES                     []Other:  []w/US   []w/ice   []w/heat  Position:  Location:       []  Traction: [] Cervical       []Lumbar                       [] Prone          []Supine                       []Intermittent   []Continuous Lbs:  [] before manual  [] after manual  []w/heat    []  Ultrasound: []Continuous   [] Pulsed                       at: []1MHz   []3MHz Location:  W/cm2:    [] Paraffin         Location:   []w/heat    []  Ice     [x]  Heat  []  Ice massage Position:supine  Location:low back    []  Laser  []  Other: Position:  Location:      []  Vasopneumatic Device Pressure:       [] lo [] med [] hi   Temperature:      [x] Skin assessment post-treatment:  [x]intact [x]redness- no adverse reaction    []redness - adverse reaction:       12 min manual therapy: PROM with grade III-IV GH mobs inf and post with ST upward glides, STM to post axilla along the lats and teres group. Rationale: Increase ROM, increase strength, posture, and mobility to improve patients ability to complete all activity.     51 min Therapeutic Exercise:  [x] See flow sheet :   Rationale: increase ROM, increase strength, improve coordination, improve balance and increase proprioception to improve the patients ability to tolerate activity and home care duties with decreased discomfort and pain. With   [x] TE   [] TA   [] neuro   [] other: Patient Education: [x] Review HEP    [x] Progressed/Changed HEP based on:   [x] positioning   [x] body mechanics   [] transfers   [] heat/ice application    [] other:      Other Objective/Functional Measures: None noted     Pain Level (0-10 scale) post treatment: 0-1    ASSESSMENT/Changes in Function:   Pt is doing very well today and having no pain. She demonstrates better posture and is progressing her ability with all there-ex and is compliant with HEP. Will plan to continue with one more session this week before discussing a switch to 1x/wk for the remainder of her visits. Patient will continue to benefit from skilled PT services to modify and progress therapeutic interventions, address functional mobility deficits, address ROM deficits, address strength deficits, analyze and address soft tissue restrictions, analyze and cue movement patterns, analyze and modify body mechanics/ergonomics, assess and modify postural abnormalities and address imbalance/dizziness to attain remaining goals. [x]  See Plan of Care  []  See progress note/recertification  []  See Discharge Summary         Progress towards goals / Updated goals:  Pt progressing towards goals and tolerating progression of exercises and increased home activity without increased shoulder/neck symptoms.     PLAN  [x]  Upgrade activities as tolerated     [x]  Continue plan of care  [x]  Update interventions per flow sheet       []  Discharge due to:_  []  Other:_      Arpita Smiley, PT 11/23/2020

## 2020-11-25 ENCOUNTER — HOSPITAL ENCOUNTER (OUTPATIENT)
Dept: PHYSICAL THERAPY | Age: 65
Discharge: HOME | End: 2020-11-25
Payer: MEDICAID

## 2020-11-25 ENCOUNTER — APPOINTMENT (OUTPATIENT)
Dept: PHYSICAL THERAPY | Age: 65
End: 2020-11-25
Payer: MEDICAID

## 2020-11-25 PROCEDURE — 97110 THERAPEUTIC EXERCISES: CPT | Performed by: PHYSICAL THERAPIST

## 2020-11-25 PROCEDURE — 97140 MANUAL THERAPY 1/> REGIONS: CPT | Performed by: PHYSICAL THERAPIST

## 2020-11-25 NOTE — PROGRESS NOTES
PT DAILY TREATMENT NOTE 2-15    Patient Name: Fermin River  Date:2020  : 1955  [x]  Patient  Verified  Payor: [address] / Plan: [address] [place] [address] / Product Type: Managed Care Medicaid /    In time:1239 Out time:123  Total Treatment Time (min): 44  Visit #:  10    Treatment Area: Cervicalgia [M54.2]    SUBJECTIVE  Pain Level (0-10 scale): 0  Any medication changes, allergies to medications, adverse drug reactions, diagnosis change, or new procedure performed?: [x] No    [] Yes (see summary sheet for update)  Subjective functional status/changes:   [x] No changes reported  Pt states that she is doing well  OBJECTIVE    Modality rationale: decrease pain and increase tissue extensibility to improve the patients ability to tolerate all activity   Min Type Additional Details       [] Estim: []Att   []Unatt    []TENS instruct                  []IFC  []Premod   []NMES                     []Other:  []w/US   []w/ice   []w/heat  Position:  Location:       []  Traction: [] Cervical       []Lumbar                       [] Prone          []Supine                       []Intermittent   []Continuous Lbs:  [] before manual  [] after manual  []w/heat    []  Ultrasound: []Continuous   [] Pulsed                       at: []1MHz   []3MHz Location:  W/cm2:    [] Paraffin         Location:   []w/heat    []  Ice     [x]  Heat  []  Ice massage Position:supine  Location:low back    []  Laser  []  Other: Position:  Location:      []  Vasopneumatic Device Pressure:       [] lo [] med [] hi   Temperature:      [x] Skin assessment post-treatment:  [x]intact [x]redness- no adverse reaction    []redness - adverse reaction:       10 min manual therapy: PROM with grade III-IV GH mobs inf and post with ST upward glides, STM to post axilla along the lats and teres group. Rationale: Increase ROM, increase strength, posture, and mobility to improve patients ability to complete all activity.     34 min Therapeutic Exercise:  [x] See flow sheet :   Rationale: increase ROM, increase strength, improve coordination, improve balance and increase proprioception to improve the patients ability to tolerate activity and home care duties with decreased discomfort and pain. With   [x] TE   [] TA   [] neuro   [] other: Patient Education: [x] Review HEP    [x] Progressed/Changed HEP based on:   [x] positioning   [x] body mechanics   [] transfers   [] heat/ice application    [] other:      Other Objective/Functional Measures: None noted     Pain Level (0-10 scale) post treatment: 0    ASSESSMENT/Changes in Function:   Pt continues to progress well and demonstrates increased strength with all activity. She is returning to all household chores and is back to all activity. Will plan 1x/wk for 2-3 visits to ensure smooth transition to independence and make any modifications necessary. Patient will continue to benefit from skilled PT services to modify and progress therapeutic interventions, address functional mobility deficits, address ROM deficits, address strength deficits, analyze and address soft tissue restrictions, analyze and cue movement patterns, analyze and modify body mechanics/ergonomics, assess and modify postural abnormalities and address imbalance/dizziness to attain remaining goals. [x]  See Plan of Care  []  See progress note/recertification  []  See Discharge Summary         Progress towards goals / Updated goals:  Pt progressing towards goals and tolerating progression of exercises and increased home activity without increased shoulder/neck symptoms.     PLAN  [x]  Upgrade activities as tolerated     [x]  Continue plan of care  [x]  Update interventions per flow sheet       []  Discharge due to:_  []  Other:_      Signa Simmonds, PT 11/25/2020

## 2020-11-25 NOTE — PROGRESS NOTES
[facility]  [address] ([address]), [address] [place]  Phone: [phone] Fax: [phone]    Progress Note    Name: Dione Franklin   : 1955   MD: Hugo Brunner, *       Treatment Diagnosis: Cervicalgia [M54.2]  Start of Care: 10/14/20    Visits from Start of Care: 10  Missed Visits: 3    Summary of Care: Pt has completed 10 visits of therapy focused on restoring ROM and strength to improve function and stability. She has progressed very well and able to complete all there-ex. She is also demonstrating improved posture and scap positioning. She is no longer limited in completing her household chores or yardwork. [facility] and she is progressing her strength with overhead activity. Will plan to continue 1x/wk for 2-3 visits to transition to independence. Assessment / Recommendations:     Goal:  Short Term Goals: To be accomplished in 8-10 treatments:               Pt will be I with HEP MET  Pt will complain of pain 2-3/10 with all activity MET  Pt will increase ROM to complete all ADL's more efficiently MET  Pt will demonstrate better posture and be able to self correct at rest and during activity MET     Long Term Goals: To be accomplished in 16-18 treatments:               Pt will complain of pain 0-1/10 with all activity Met intermittently  Pt will increase strength to maintain proper posture with all activity  MET  Pt will increase FOTO score by 5 points to meet MDC and improve their function Progressing Toward  Pt will return to all activity without increased symptoms MET intermittently    Other: cont 1x wk x 2-3 visits      Nichelle Ellis PT 2020     ________________________________________________________________________  NOTE TO PHYSICIAN:  Please complete the following and fax to: [facility]: [phone]  . Retain this original for your records.   If you are unable to process this request in 24 hours, please contact our office.        ____ I have read the above report and request that my patient continue therapy with the following changes/special instructions:  ____ I have read the above report and request that my patient be discharged from therapy    Physician's Signature:_________________ Date:___________Time:__________

## 2020-11-30 ENCOUNTER — HOSPITAL ENCOUNTER (OUTPATIENT)
Dept: PHYSICAL THERAPY | Age: 65
Discharge: HOME | End: 2020-11-30
Payer: MEDICAID

## 2020-11-30 PROCEDURE — 97110 THERAPEUTIC EXERCISES: CPT | Performed by: PHYSICAL THERAPIST

## 2020-11-30 PROCEDURE — 97140 MANUAL THERAPY 1/> REGIONS: CPT | Performed by: PHYSICAL THERAPIST

## 2020-12-09 ENCOUNTER — APPOINTMENT (OUTPATIENT)
Dept: PHYSICAL THERAPY | Age: 65
End: 2020-12-09
Payer: MEDICAID

## 2020-12-16 ENCOUNTER — HOSPITAL ENCOUNTER (OUTPATIENT)
Dept: PHYSICAL THERAPY | Age: 65
Discharge: HOME | End: 2020-12-16
Payer: MEDICAID

## 2020-12-16 DIAGNOSIS — I10 ESSENTIAL HYPERTENSION WITH GOAL BLOOD PRESSURE LESS THAN 130/80: ICD-10-CM

## 2020-12-16 PROCEDURE — 97110 THERAPEUTIC EXERCISES: CPT | Performed by: PHYSICAL THERAPIST

## 2020-12-16 NOTE — PROGRESS NOTES
PT DAILY TREATMENT NOTE 2-15    Patient Name: King Brady  Date:2020  : 1955  [x]  Patient  Verified  Payor: [address] / Plan: [address] [facility] / Product Type: Managed Care Medicaid /    In time:1232 Out time:130  Total Treatment Time (min): 62  Visit #:  12    Treatment Area: Cervicalgia [M54.2]    SUBJECTIVE  Pain Level (0-10 scale): 4  Any medication changes, allergies to medications, adverse drug reactions, diagnosis change, or new procedure performed?: [x] No    [] Yes (see summary sheet for update)  Subjective functional status/changes:   [] No changes reported  Pt reports that she is doing well but a little more sore today than normal.  However, overall she states that she is so much better  OBJECTIVE    Modality rationale: decrease pain and increase tissue extensibility to improve the patients ability to tolerate all activity   Min Type Additional Details       [] Estim: []Att   []Unatt    []TENS instruct                  []IFC  []Premod   []NMES                     []Other:  []w/US   []w/ice   []w/heat  Position:  Location:       []  Traction: [] Cervical       []Lumbar                       [] Prone          []Supine                       []Intermittent   []Continuous Lbs:  [] before manual  [] after manual  []w/heat    []  Ultrasound: []Continuous   [] Pulsed                       at: []1MHz   []3MHz Location:  W/cm2:    [] Paraffin         Location:   []w/heat    []  Ice     [x]  Heat  []  Ice massage Position:supine  Location:low back    []  Laser  []  Other: Position:  Location:      []  Vasopneumatic Device Pressure:       [] lo [] med [] hi   Temperature:      [x] Skin assessment post-treatment:  [x]intact [x]redness- no adverse reaction    []redness - adverse reaction:     47 min Therapeutic Exercise:  [x] See flow sheet :   Rationale: increase ROM, increase strength, improve coordination, improve balance and increase proprioception to improve the patients ability to tolerate activity and home care duties with decreased discomfort and pain. With   [x] TE   [] TA   [] neuro   [] other: Patient Education: [x] Review HEP    [x] Progressed/Changed HEP based on:   [x] positioning   [x] body mechanics   [] transfers   [] heat/ice application    [] other:      Other Objective/Functional Measures: None noted     Pain Level (0-10 scale) post treatment: 0    ASSESSMENT/Changes in Function:   Pt will be DC'd today having met her treatment goals and transitioning to an independent HEP. Patient will continue to benefit from skilled PT services to modify and progress therapeutic interventions, address functional mobility deficits, address ROM deficits, address strength deficits, analyze and address soft tissue restrictions, analyze and cue movement patterns, analyze and modify body mechanics/ergonomics, assess and modify postural abnormalities and address imbalance/dizziness to attain remaining goals. []  See Plan of Care  []  See progress note/recertification  [x]  See Discharge Summary         Progress towards goals / Updated goals:  Pt progressing towards goals and tolerating progression of exercises and increased home activity without increased shoulder/neck symptoms.     PLAN  []  Upgrade activities as tolerated     []  Continue plan of care  []  Update interventions per flow sheet       [x]  Discharge due to:meeting goals  []  Other:_      Candice Tran, PT 12/16/2020

## 2020-12-16 NOTE — PROGRESS NOTES
[facility] Physical Therapy  [address] ([address]), [address] [place]  Phone: [phone] Fax: [phone]    Medicaid Discharge Summary  2-15    Patient name: Tyrone Dixon  : 1955  Provider#: [id number]  Referral source: James Costa, *      Medical/Treatment Diagnosis: Cervicalgia [M54.2]     Prior Hospitalization: see medical history     Comorbidities: See Plan of Care  Prior Level of Function:See Plan of Care  Medications: Verified on Patient Summary List    Start of Care: 10/14/20      Onset Date:2020   Visits from Start of Care: 12    Missed Visits: 4  Reporting Period : 10/14/20 to 20      ASSESSMENT/SUMMARY OF CARE: Pt has completed 12 visits of therapy that have focused on restoring ROM and strength to the left shoulder as well as improving scapular stability and postural awareness. She has progressed nicely reaching all treatment goals established. At this time she will be DC'd to an independent home program that she is encouraged to continue. She has returned to all household and yard work. She is unlimited in her ability to reach overhead and her strength is well WFL.      Goal:  Short Term Goals: To be accomplished in 8-10 treatments:               Pt will be I with HEP MET  Pt will complain of pain 2-3/10 with all activity MET  Pt will increase ROM to complete all ADL's more efficiently MET  Pt will demonstrate better posture and be able to self correct at rest and during activity MET     Long Term Goals: To be accomplished in 16-18 treatments:               Pt will complain of pain 0-1/10 with all activity Met intermittently  Pt will increase strength to maintain proper posture with all activity  MET  Pt will increase FOTO score by 5 points to meet MDC and improve their function MET  Pt will return to all activity without increased symptoms MET          RECOMMENDATIONS:  [x]Discontinue therapy: [x]Patient has reached or is progressing toward set goals      []Patient is non-compliant or has abdicated      []Due to lack of appreciable progress towards set goals      []Other    Arleen Roldan, PT 12/16/2020       ______________________________________________________________________  NOTE TO PHYSICIAN:  Please complete the following and fax to: [place] [id number]. Physical Therapy: Fax: [phone]. Retain this original for your records. If you are unable to process this request in 24 hours, please contact our office.      [address] [place] [address] Signature:____________________  Date:____________Time:_________

## 2020-12-22 NOTE — TELEPHONE ENCOUNTER
DR. Vega Zimmer,          Per Pt message and response from you on 07/07/2020 the patient is now taking Prinivil 20 mg twice daily. Please review and prescribe if appropriate. Thank you. Last visit 07/07/2020 Virtual visit MD Vega Zimmer   Next appointment Nothing scheduled   Previous refill encounter(s)   07/07/2020 Prinivil #90 with 1 refill,  06/18/2020 Norvasc #90 with 1 refill. Requested Prescriptions     Pending Prescriptions Disp Refills    amLODIPine (NORVASC) 10 mg tablet [Pharmacy Med Name: AMLODIPINE 10 MG TAB[*]] 90 Tab 0     Sig: Take 1 Tab by mouth daily.  lisinopriL (PRINIVIL, ZESTRIL) 40 mg tablet [Pharmacy Med Name: LISINOPRIL 40 MG TAB[*]] 90 Tab 0     Sig: Take 0.5 Tabs by mouth two (2) times a day.

## 2020-12-26 RX ORDER — AMLODIPINE BESYLATE 10 MG/1
10 TABLET ORAL DAILY
Qty: 90 TAB | Refills: 0 | Status: SHIPPED | OUTPATIENT
Start: 2020-12-26 | End: 2021-04-01

## 2020-12-26 RX ORDER — LISINOPRIL 40 MG/1
20 TABLET ORAL 2 TIMES DAILY
Qty: 90 TAB | Refills: 0 | Status: SHIPPED | OUTPATIENT
Start: 2020-12-26 | End: 2021-04-01

## 2020-12-26 NOTE — TELEPHONE ENCOUNTER
Refill request(s) approved--amlodipine, lisinopril--90-day supply with 0 refill(s). [employer] message to pt--to schedule follow-up appt.

## 2021-01-08 ENCOUNTER — VIRTUAL VISIT (OUTPATIENT)
Dept: INTERNAL MEDICINE CLINIC | Age: 66
End: 2021-01-08
Payer: MEDICAID

## 2021-01-08 DIAGNOSIS — J30.9 ALLERGIC SINUSITIS: Primary | ICD-10-CM

## 2021-01-08 DIAGNOSIS — K08.89 PAIN, DENTAL: ICD-10-CM

## 2021-01-08 DIAGNOSIS — F43.23 ADJUSTMENT DISORDER WITH MIXED ANXIETY AND DEPRESSED MOOD: ICD-10-CM

## 2021-01-08 DIAGNOSIS — I10 ESSENTIAL HYPERTENSION WITH GOAL BLOOD PRESSURE LESS THAN 130/80: ICD-10-CM

## 2021-01-08 DIAGNOSIS — E11.65 CONTROLLED TYPE 2 DIABETES MELLITUS WITH HYPERGLYCEMIA, WITHOUT LONG-TERM CURRENT USE OF INSULIN (HCC): ICD-10-CM

## 2021-01-08 DIAGNOSIS — M79.89 LEG SWELLING: ICD-10-CM

## 2021-01-08 PROCEDURE — 99214 OFFICE O/P EST MOD 30 MIN: CPT | Performed by: INTERNAL MEDICINE

## 2021-01-08 RX ORDER — LANCETS
EACH MISCELLANEOUS
Qty: 100 EACH | Refills: 5 | Status: SHIPPED | OUTPATIENT
Start: 2021-01-08

## 2021-01-08 RX ORDER — INSULIN PUMP SYRINGE, 3 ML
EACH MISCELLANEOUS
Qty: 1 KIT | Refills: 0 | Status: SHIPPED | OUTPATIENT
Start: 2021-01-08

## 2021-01-08 RX ORDER — FLUTICASONE PROPIONATE 50 MCG
2 SPRAY, SUSPENSION (ML) NASAL DAILY
Qty: 1 BOTTLE | Refills: 5 | Status: SHIPPED | OUTPATIENT
Start: 2021-01-08 | End: 2022-02-06
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
## 2021-01-08 NOTE — PROGRESS NOTES
Virtual Visit  Took blood pressure Thursday and it was high       Chief Complaint   Patient presents with    Headache     fluid base of neck swelling. started a few months ago. 1. Have you been to the ER, urgent care clinic since your last visit? Hospitalized since your last visit? No    2. Have you seen or consulted any other health care providers outside of the [address] [place] [address] since your last visit? Include any pap smears or colon screening. No    Health Maintenance Due   Topic Date Due    Hepatitis C Screening  1955    Foot Exam Q1  03/07/1967    Eye Exam Retinal or Dilated  03/07/1967    DTaP/Tdap/Td series (1 - Tdap) 03/07/1978    Shingrix Vaccine Age 50> (1 of 2) 03/07/2007    Breast Cancer Screen Mammogram  06/19/2014    PAP AKA CERVICAL CYTOLOGY  07/18/2019    Colorectal Cancer Screening Combo  12/28/2019    Flu Vaccine (1) 09/01/2020       3 most recent PHQ Screens 7/7/2020   Little interest or pleasure in doing things Not at all   Feeling down, depressed, irritable, or hopeless Not at all   Total Score PHQ 2 0     No flowsheet data found. Abuse Screening Questionnaire 7/7/2020   Do you ever feel afraid of your partner? N   Are you in a relationship with someone who physically or mentally threatens you? N   Is it safe for you to go home? Y     No flowsheet data found. Learning Assessment 6/18/2020   PRIMARY LEARNER Patient   HIGHEST LEVEL OF EDUCATION - PRIMARY LEARNER  -   BARRIERS PRIMARY LEARNER NONE   PRIMARY LANGUAGE ENGLISH   LEARNER PREFERENCE PRIMARY LISTENING     READING     VIDEOS     DEMONSTRATION   ANSWERED BY patient   RELATIONSHIP SELF           AVS  education, follow up, and recommendations provided and addressed with patient.  services used to advise patient n0 .

## 2021-01-15 ENCOUNTER — VIRTUAL VISIT (OUTPATIENT)
Dept: INTERNAL MEDICINE CLINIC | Age: 66
End: 2021-01-15
Payer: MEDICAID

## 2021-01-15 DIAGNOSIS — J30.9 ALLERGIC SINUSITIS: Primary | ICD-10-CM

## 2021-01-15 DIAGNOSIS — E11.65 CONTROLLED TYPE 2 DIABETES MELLITUS WITH HYPERGLYCEMIA, WITHOUT LONG-TERM CURRENT USE OF INSULIN (HCC): ICD-10-CM

## 2021-01-15 DIAGNOSIS — I10 ESSENTIAL HYPERTENSION WITH GOAL BLOOD PRESSURE LESS THAN 130/80: ICD-10-CM

## 2021-01-15 PROCEDURE — 99214 OFFICE O/P EST MOD 30 MIN: CPT | Performed by: INTERNAL MEDICINE

## 2021-01-15 NOTE — PROGRESS NOTES
Virtual Visit      Chief Complaint   Patient presents with    Headache      feeling better but has swelling on the left side of head. headache was on the left side.  Follow-up       1. Have you been to the ER, urgent care clinic since your last visit? Hospitalized since your last visit? No    2. Have you seen or consulted any other health care providers outside of the [address] [place] [address] since your last visit? Include any pap smears or colon screening. No    Health Maintenance Due   Topic Date Due    Hepatitis C Screening  1955    Pneumococcal 0-64 years (1 of 1 - PPSV23) 03/07/1963    Foot Exam Q1  03/07/1967    Eye Exam Retinal or Dilated  03/07/1967    DTaP/Tdap/Td series (1 - Tdap) 03/07/1978    Shingrix Vaccine Age 50> (1 of 2) 03/07/2007    Breast Cancer Screen Mammogram  06/19/2014    PAP AKA CERVICAL CYTOLOGY  07/18/2019    Colorectal Cancer Screening Combo  12/28/2019    Flu Vaccine (1) 09/01/2020       Learning Assessment 6/18/2020   PRIMARY LEARNER Patient   HIGHEST LEVEL OF EDUCATION - PRIMARY LEARNER  -   BARRIERS PRIMARY LEARNER NONE   PRIMARY LANGUAGE ENGLISH   LEARNER PREFERENCE PRIMARY LISTENING     READING     VIDEOS     DEMONSTRATION   ANSWERED BY patient   RELATIONSHIP SELF         AVS, education, follow up and recommendations provided and addressed with patient.   services used to advise patient no

## 2021-01-15 NOTE — PROGRESS NOTES
Iwona Tariq (: 1955) is a 63 y.o. female, established patient, here for evaluation of the following chief complaint(s):  Headache ( feeling better but has swelling on the left side of head. headache was on the left side. ) and Follow-up    Iwona Tariq is a 63 y.o. female who was seen by synchronous (real-time) audio-video technology on 1/15/2021. Consent: Iwona Tariq, who was seen by synchronous (real-time) audio-video technology, and/or her healthcare decision maker, is aware that this patient-initiated, Telehealth encounter on 1/15/2021 is a billable service, with coverage as determined by her insurance carrier. She is aware that she may receive a bill and has provided verbal consent to proceed: Yes. I was in the office while conducting this encounter. Subjective:   Iwona Tariq was seen for:  Chief Complaint   Patient presents with    Headache      feeling better but has swelling on the left side of head. headache was on the left side.  Follow-up     She notes Flonase has helped. She is continuing with other meds as reviewed. Swelling has improved, and less tender. BP has iproved also.  3PM 140/87  3:28PM  154/90   7AM 125/72  9:35PM  130/76  1/15 8:57AM  115/78 9:03AM  121/77    She thinks some of her higher BP's were related to not sitting prior to her BP checks. She is aware how to use current meter as recommended. Notes:  She did get glucometer, but learning how to use. She has not used yet. She didn't ask pharmacy to help her. Reviewed lancets, auto-lancet device, strips and meter with pt during visit. Nursing screenings reviewed by provider at visit. Allergies   Allergen Reactions    Naprosyn [Naproxen] Other (comments)     Hallucinations. Tolerates Ibuprofen without problems. Prior to Admission medications    Medication Sig Start Date End Date Taking?  Authorizing Provider   fluticasone propionate (FLONASE) 50 mcg/actuation nasal spray 2 Sprays by Both Nostrils route daily. 1/8/21  Yes Aleksandra Ragland MD   glucose blood VI test strips (ASCENSIA AUTODISC VI, ONE TOUCH ULTRA TEST VI) strip Test blood sugar 1-2 times daily. Dx:  DM type 2 with hyperglycemia. Dispense insurance-covered meter/strips. 1/8/21  Yes Aleksandra Ragland MD   lancets misc Test blood sugar 1-2 times daily. Dx:  DM type 2 with hyperglycemia. Dispense insurance-covered meter/strips. 1/8/21  Yes Aleksandra Ragland MD   Blood-Glucose Meter monitoring kit Test blood sugar 1-2 times daily. Dx:  DM type 2 with hyperglycemia. Dispense insurance-covered meter/strips. 1/8/21  Yes Aleksandra Ragland MD   amLODIPine (NORVASC) 10 mg tablet Take 1 Tab by mouth daily. 12/26/20  Yes Aleksandra Ragland MD   lisinopriL (PRINIVIL, ZESTRIL) 40 mg tablet Take 0.5 Tabs by mouth two (2) times a day. 12/26/20  Yes Aleksandra Ragland MD   cetirizine (ZYRTEC) 10 mg tablet Take 1 Tab by mouth nightly. 7/7/20  Yes Aleksandra Ragland MD   melatonin 5 mg cap capsule Take 1 Cap by mouth nightly. May increase to 10mg (2 capsules) in 5-7 days as needed for sleep. 7/7/20  Yes Aleksandra Ragland MD   multivitamin (ONE A DAY) tablet Take 1 Tab by mouth daily. Yes Provider, Historical   OTHER Dispense blood pressure monitoring kit to pt. Please ensure non-wrist cuff device, with appropriately sized cuff to allow for accurate home BP monitoring. 6/18/20  Yes Aleksandra Ragland MD         ROS    PHYSICAL EXAMINATION:    Vital Signs: There were no vitals taken for this visit. Patient-Reported Vitals 1/8/2021   Patient-Reported Temperature 97.6   BP's as above.     Constitutional: [x] Appears well-developed and well-nourished [x] No apparent distress      Mental status: [x] Alert and awake  [x] Oriented [x] Able to follow commands       Eyes:   EOM    [x]  Normal      Sclera  [x]  Normal              Discharge [x]  None visible       HENT: [x] Normocephalic, atraumatic    [x] Mouth/Throat: Mucous membranes are moist    External Ears [x] Normal      Neck: [x] No visualized mass     Pulmonary/Chest: [x] Respiratory effort normal   [x] No visualized signs of difficulty breathing or respiratory distress    Musculoskeletal:  [x] Normal range of motion of neck    Neurological:        [x] No Facial Asymmetry (Cranial nerve 7 motor function) (limited exam due to video visit)          [x] No gaze palsy     Skin:        [x] No significant exanthematous lesions or discoloration noted on facial skin             Psychiatric:       [x] Normal Affect       Other pertinent observable physical exam findings:  None. We discussed the expected course, resolution and complications of the diagnosis(es) in detail. Medication risks, benefits, costs, interactions, and alternatives were discussed as indicated. I advised her to contact the office if her condition worsens, changes or fails to improve as anticipated. She expressed understanding with the diagnosis(es) and plan. Cely Altman is a 63 y.o. female who was evaluated by a video visit encounter for concerns as     Assessment & Plan:   Diagnoses and all orders for this visit:      ICD-10-CM ICD-9-CM    1. Allergic sinusitis  J30.9 477.9    2. Essential hypertension with goal blood pressure less than 130/80  I10 401.9    3. Controlled type 2 diabetes mellitus with hyperglycemia, without long-term current use of insulin (HCC)  E11.65 250.80      790.29        1. Continue current mgt--improving. 2.  Improved BP's as above. Validate cuff with follow-up--she is planning to bring with her. 3.  HGM instruction as above. She will get more info by bringing meter and supplies to follow-up as below. Follow-up and Dispositions    · Return follow-up--as scheduled (BP, DM, sinus allergies).        lab results and schedule of future lab studies reviewed with patient  reviewed medications and side effects in detail    Plan and evaluation (above) reviewed with pt at visit  Patient voiced understanding of plan and provided with time to ask/review questions. After Visit Summary (AVS) provided to pt after visit with additional instructions as needed/reviewed. AVS:  [x]  Available to patient in MyChart after visit signed. []  Mailed to patient after visit. []  Not sent to patient after visit. Future Appointments   Date Time Provider Brittany Olivera   1/26/2021  2:00 PM Holden Weinstein MD [facility] JOHNIE Blanchardjasmine Sanabria is being evaluated by a Virtual Visit (video visit) encounter to address concerns as mentioned above. A caregiver was present when appropriate. Due to this being a TeleHealth encounter (During YRCYT-90 public health emergency), evaluation of the following organ systems was limited: Vitals/Constitutional/EENT/Resp/CV/GI//MS/Neuro/Skin/Heme-Lymph-Imm. Pursuant to the emergency declaration under the [address] [place] [address] authority and the [employer] Act, this Virtual Visit was conducted with patient's (and/or legal guardian's) consent, to reduce the patient's risk of exposure to COVID-19 and provide necessary medical care. The patient (and/or legal guardian) has also been advised to contact this office for worsening conditions or problems, and seek emergency medical treatment and/or call 911 if deemed necessary. Patient identification was verified at the start of the visit: YES. Services were provided through a video synchronous discussion virtually to substitute for in-person clinic visit. Patient was located at their individual home (or other location as per patient preference). Provider was located in medical office. An electronic signature was used to authenticate this note.   -- Tia Schreiber MD

## 2021-01-26 ENCOUNTER — OFFICE VISIT (OUTPATIENT)
Dept: INTERNAL MEDICINE CLINIC | Age: 66
End: 2021-01-26
Payer: MEDICAID

## 2021-01-26 VITALS
HEIGHT: 64 IN | TEMPERATURE: 97.2 F | HEART RATE: 80 BPM | SYSTOLIC BLOOD PRESSURE: 155 MMHG | RESPIRATION RATE: 18 BRPM | BODY MASS INDEX: 34.31 KG/M2 | OXYGEN SATURATION: 99 % | WEIGHT: 201 LBS | DIASTOLIC BLOOD PRESSURE: 77 MMHG

## 2021-01-26 DIAGNOSIS — Z01.419 ROUTINE GYNECOLOGICAL EXAMINATION: ICD-10-CM

## 2021-01-26 DIAGNOSIS — E11.65 CONTROLLED TYPE 2 DIABETES MELLITUS WITH HYPERGLYCEMIA, WITHOUT LONG-TERM CURRENT USE OF INSULIN (HCC): ICD-10-CM

## 2021-01-26 DIAGNOSIS — Z11.59 NEED FOR HEPATITIS C SCREENING TEST: ICD-10-CM

## 2021-01-26 DIAGNOSIS — E78.00 HYPERCHOLESTEREMIA: ICD-10-CM

## 2021-01-26 DIAGNOSIS — I10 ESSENTIAL HYPERTENSION WITH GOAL BLOOD PRESSURE LESS THAN 130/80: Primary | ICD-10-CM

## 2021-01-26 PROCEDURE — 99214 OFFICE O/P EST MOD 30 MIN: CPT | Performed by: INTERNAL MEDICINE

## 2021-01-26 RX ORDER — BLOOD-GLUCOSE METER
EACH MISCELLANEOUS
COMMUNITY
Start: 2021-01-08

## 2021-01-26 RX ORDER — CHLORTHALIDONE 25 MG/1
25 TABLET ORAL DAILY
Qty: 30 TAB | Refills: 2 | Status: SHIPPED | OUTPATIENT
Start: 2021-01-26 | End: 2021-04-10 | Stop reason: SDUPTHER

## 2021-01-26 NOTE — PROGRESS NOTES
RM # [id number]      Chief Complaint   Patient presents with    Follow-up     BP andDM       1. Have you been to the ER, urgent care clinic since your last visit? Hospitalized since your last visit? No    2. Have you seen or consulted any other health care providers outside of the [address] [place] [address] since your last visit? Include any pap smears or colon screening. No    Health Maintenance Due   Topic Date Due    Hepatitis C Screening  1955    Pneumococcal 0-64 years (1 of 1 - PPSV23) 03/07/1963    Foot Exam Q1  03/07/1967    Eye Exam Retinal or Dilated  03/07/1967    COVID-19 Vaccine (1 of 2) 03/07/1973    DTaP/Tdap/Td series (1 - Tdap) 03/07/1978    Shingrix Vaccine Age 50> (1 of 2) 03/07/2007    Breast Cancer Screen Mammogram  06/19/2014    PAP AKA CERVICAL CYTOLOGY  07/18/2019    Colorectal Cancer Screening Combo  12/28/2019    Flu Vaccine (1) 09/01/2020       Learning Assessment 6/18/2020   PRIMARY LEARNER Patient   HIGHEST LEVEL OF EDUCATION - PRIMARY LEARNER  -   BARRIERS PRIMARY LEARNER NONE   PRIMARY LANGUAGE ENGLISH   LEARNER PREFERENCE PRIMARY LISTENING     READING     VIDEOS     DEMONSTRATION   ANSWERED BY patient   RELATIONSHIP SELF           AVS, education, follow up and recommendations provided and addressed with patient.   services used to advise patient no

## 2021-01-26 NOTE — LETTER
2021 3:22 PM 
 
Ms. Eldon [phone] [id number] :  1955 To Whom It May Concern: The current BP monitor that the patient has, is not accurate. Please allow her to return for another monitor or a better/more accurate one. If you prefer to validate new monitor there: To make sure your home cuff is accurate: 
  
Check your BP and heart rate. Compare these values with a validated monitor (grocery store, pharmacy, manual cuff with medical provider/clinic), to be sure the home cuff readings are accurate. To be accurate, recommend that the SBP's (systolic BP's or top numbers) be within 10 points of each other, and the DBP's (diastolic BP's or bottom numbers) to be within 5 points of each other. If your home meter is accurate, keep a log as reviewed, and will review here at your BP follow-up visit. If you note values >135/85 (either number) consistently, please return sooner to review. Please let me know if you have other questions.  
 
Ezio Spain MD

## 2021-01-26 NOTE — PATIENT INSTRUCTIONS
Please have your pharmacy print out a copy of your vaccines for our records. If their record lists \"Zoster Vaccine, Live\"--please clarify if they gave you Zostavax (live) or Shingrix (not live) as this vaccine).

## 2021-01-26 NOTE — PROGRESS NOTES
Ulices Lowry (: 1955) is a 63 y.o. female, established patient, here for evaluation of the following chief complaint(s):  Chief Complaint   Patient presents with    Follow-up     BP andDM       Assessment and Plan:       ICD-10-CM ICD-9-CM    1. Essential hypertension with goal blood pressure less than 130/80  I10 401.9 chlorthalidone (HYGROTON) 25 mg tablet      METABOLIC PANEL, COMPREHENSIVE      CBC WITH AUTOMATED DIFF      LIPID PANEL   2. Controlled type 2 diabetes mellitus with hyperglycemia, without long-term current use of insulin (HCC)  A91.17 514.82 METABOLIC PANEL, COMPREHENSIVE     790.29 HEMOGLOBIN A1C WITH EAG      CBC WITH AUTOMATED DIFF      LIPID PANEL       DIABETES FOOT EXAM      VITAMIN D, 25 HYDROXY   3. Hypercholesteremia  M91.04 762.8 METABOLIC PANEL, COMPREHENSIVE      LIPID PANEL      CK   4. Need for hepatitis C screening test  Z11.59 V73.89 HEPATITIS C AB   5. Routine gynecological examination  Z01.419 V72.31 REFERRAL TO OBSTETRICS AND GYNECOLOGY       1. Addition diuretic and dosing reviewed with pt at visit. Medication(s), management and follow-up based on response reviewed at visit. 1-4:  Updated fasting labs reviewed. 5.  Referral(s) and referral coordination reviewed with patient at visit. Follow-up and Dispositions    · Return in about 4 weeks (around 2021) for Blood Pressure follow-up (with new monitor), lab review. lab results and schedule of future lab studies reviewed with patient  reviewed medications and side effects in detail    For additional documentation of information and/or recommendations discussed this visit, please see notes in instructions. Plan and evaluation (above) reviewed with pt at visit  Patient voiced understanding of plan and provided with time to ask/review questions. After Visit Summary (AVS) provided to pt after visit with additional instructions as needed/reviewed.         Future Appointments   Date Time Provider Brittany Joselin   2/23/2021  2:00 PM Henri Lopez MD [facility] BS AMB   --Updated future visits after patient check-out. History of Present Illness:     Notes (nursing/rooming note copied below in italics):  As above    BP here right arm sitting with her cuff was 161/94, HR 82. Reviewed home cuff not accurate. Letter printed to assist pt with getting new cuff. Reviewed questions with pt and nursing helped her with her lancets and autolancet device. The lancets that pharmacy provided do not work with auto-lancet device--too short. Nursing demonstrated ot pt to clarify with pharmacy. She will update labs as reviewed. BP mgt reviewed. Vaccine questions and updating records here reviewed. Nursing screenings reviewed by provider at visit. Prior to Admission medications    Medication Sig Start Date End Date Taking? Authorizing Provider   fluticasone propionate (FLONASE) 50 mcg/actuation nasal spray 2 Sprays by Both Nostrils route daily. 1/8/21  Yes Henri Lopez MD   glucose blood VI test strips (ASCENSIA AUTODISC VI, ONE TOUCH ULTRA TEST VI) strip Test blood sugar 1-2 times daily. Dx:  DM type 2 with hyperglycemia. Dispense insurance-covered meter/strips. 1/8/21  Yes Henri Lopez MD   lancets misc Test blood sugar 1-2 times daily. Dx:  DM type 2 with hyperglycemia. Dispense insurance-covered meter/strips. 1/8/21  Yes Henri Lopez MD   Blood-Glucose Meter monitoring kit Test blood sugar 1-2 times daily. Dx:  DM type 2 with hyperglycemia. Dispense insurance-covered meter/strips. 1/8/21  Yes Henri Lopez MD   amLODIPine (NORVASC) 10 mg tablet Take 1 Tab by mouth daily. 12/26/20  Yes Henri Lopez MD   lisinopriL (PRINIVIL, ZESTRIL) 40 mg tablet Take 0.5 Tabs by mouth two (2) times a day. 12/26/20  Yes Henri Lopez MD   cetirizine (ZYRTEC) 10 mg tablet Take 1 Tab by mouth nightly.  7/7/20  Yes Henri Lopez MD [place]   melatonin 5 mg cap capsule Take 1 Cap by mouth nightly. May increase to 10mg (2 capsules) in 5-7 days as needed for sleep. 7/7/20  Yes Yvette Ricketts MD   multivitamin (ONE A DAY) tablet Take 1 Tab by mouth daily. Yes Provider, Historical   OTHER Dispense blood pressure monitoring kit to pt. Please ensure non-wrist cuff device, with appropriately sized cuff to allow for accurate home BP monitoring. 6/18/20  Yes Yvette Ricketts MD   Contour Next EZ Meter misc  1/8/21   Provider, Historical        ROS    Vitals:    01/26/21 1437 01/26/21 1440 01/26/21 1455   BP: (!) 154/86 (!) 142/85 (!) 155/77   BP 1 Location: Left arm Left arm Right arm   BP Patient Position: Sitting Sitting Sitting   Pulse: 80     Resp: 18     Temp: 97.2 °F (36.2 °C)     TempSrc: Oral     SpO2: 99%     Weight: 201 lb (91.2 kg)     Height: 5' 4\" (1.626 m)           BP here right arm sitting (3rd value above) with her cuff was 161/94, HR 82. Reviewed with pt DBP not accurate on home cuff. Body mass index is 34.5 kg/m². Physical Exam:     Physical Exam  Vitals signs and nursing note reviewed. Constitutional:       General: She is not in acute distress. Appearance: Normal appearance. She is well-developed. She is not diaphoretic. HENT:      Head: Normocephalic and atraumatic. Eyes:      General: No scleral icterus. Right eye: No discharge. Left eye: No discharge. Conjunctiva/sclera: Conjunctivae normal.   Neck:      Musculoskeletal: No neck rigidity or muscular tenderness. Cardiovascular:      Rate and Rhythm: Normal rate and regular rhythm. Pulses: Normal pulses. Heart sounds: Normal heart sounds. No murmur. No friction rub. No gallop. Pulmonary:      Effort: Pulmonary effort is normal. No respiratory distress. Breath sounds: Normal breath sounds. No stridor. No wheezing or rhonchi. Abdominal:      General: Bowel sounds are normal.      Palpations: Abdomen is soft. Tenderness: There is no abdominal tenderness. Musculoskeletal:         General: No deformity or signs of injury. Lymphadenopathy:      Cervical: No cervical adenopathy. Skin:     General: Skin is warm. Coloration: Skin is not jaundiced or pale. Findings: No bruising, erythema or rash. Neurological:      General: No focal deficit present. Mental Status: She is alert. Motor: No abnormal muscle tone. Coordination: Coordination normal.      Gait: Gait normal.   Psychiatric:         Mood and Affect: Mood normal.         Behavior: Behavior normal.         Thought Content: Thought content normal.         Judgment: Judgment normal.         Diabetic foot exam:   Left Foot:   Visual Exam: normal    Pulse DP: 2+ (normal)   Filament test: normal sensation   Right Foot:   Visual Exam: normal    Pulse DP: 2+ (normal)   Filament test: normal sensation       An electronic signature was used to authenticate this note.   -- Andra Castillo MD

## 2021-01-30 LAB
25(OH)D3+25(OH)D2 SERPL-MCNC: 18.9 NG/ML (ref 30–100)
ALBUMIN SERPL-MCNC: 4.9 G/DL (ref 3.8–4.8)
ALBUMIN/GLOB SERPL: 1.8 {RATIO} (ref 1.2–2.2)
ALP SERPL-CCNC: 89 IU/L (ref 39–117)
ALT SERPL-CCNC: 20 IU/L (ref 0–32)
AST SERPL-CCNC: 22 IU/L (ref 0–40)
BASOPHILS # BLD AUTO: 0 X10E3/UL (ref 0–0.2)
BASOPHILS NFR BLD AUTO: 1 %
BILIRUB SERPL-MCNC: 0.4 MG/DL (ref 0–1.2)
BUN SERPL-MCNC: 19 MG/DL (ref 8–27)
BUN/CREAT SERPL: 23 (ref 12–28)
CALCIUM SERPL-MCNC: 10.1 MG/DL (ref 8.7–10.3)
CHLORIDE SERPL-SCNC: 97 MMOL/L (ref 96–106)
CHOLEST SERPL-MCNC: 223 MG/DL (ref 100–199)
CK SERPL-CCNC: 240 U/L (ref 32–182)
CO2 SERPL-SCNC: 29 MMOL/L (ref 20–29)
CREAT SERPL-MCNC: 0.81 MG/DL (ref 0.57–1)
EOSINOPHIL # BLD AUTO: 0.2 X10E3/UL (ref 0–0.4)
EOSINOPHIL NFR BLD AUTO: 3 %
ERYTHROCYTE [DISTWIDTH] IN BLOOD BY AUTOMATED COUNT: 12.2 % (ref 11.7–15.4)
EST. AVERAGE GLUCOSE BLD GHB EST-MCNC: 192 MG/DL
GLOBULIN SER CALC-MCNC: 2.8 G/DL (ref 1.5–4.5)
GLUCOSE SERPL-MCNC: 186 MG/DL (ref 65–99)
HBA1C MFR BLD: 8.3 % (ref 4.8–5.6)
HCT VFR BLD AUTO: 39.7 % (ref 34–46.6)
HCV AB S/CO SERPL IA: <0.1 S/CO RATIO (ref 0–0.9)
HDLC SERPL-MCNC: 49 MG/DL
HGB BLD-MCNC: 13.2 G/DL (ref 11.1–15.9)
IMM GRANULOCYTES # BLD AUTO: 0 X10E3/UL (ref 0–0.1)
IMM GRANULOCYTES NFR BLD AUTO: 0 %
LDLC SERPL CALC-MCNC: 145 MG/DL (ref 0–99)
LYMPHOCYTES # BLD AUTO: 2.4 X10E3/UL (ref 0.7–3.1)
LYMPHOCYTES NFR BLD AUTO: 35 %
MCH RBC QN AUTO: 30.2 PG (ref 26.6–33)
MCHC RBC AUTO-ENTMCNC: 33.2 G/DL (ref 31.5–35.7)
MCV RBC AUTO: 91 FL (ref 79–97)
MONOCYTES # BLD AUTO: 0.6 X10E3/UL (ref 0.1–0.9)
MONOCYTES NFR BLD AUTO: 8 %
NEUTROPHILS # BLD AUTO: 3.6 X10E3/UL (ref 1.4–7)
NEUTROPHILS NFR BLD AUTO: 53 %
PLATELET # BLD AUTO: 325 X10E3/UL (ref 150–450)
POTASSIUM SERPL-SCNC: 4.8 MMOL/L (ref 3.5–5.2)
PROT SERPL-MCNC: 7.7 G/DL (ref 6–8.5)
RBC # BLD AUTO: 4.37 X10E6/UL (ref 3.77–5.28)
SODIUM SERPL-SCNC: 139 MMOL/L (ref 134–144)
TRIGL SERPL-MCNC: 161 MG/DL (ref 0–149)
VLDLC SERPL CALC-MCNC: 29 MG/DL (ref 5–40)
WBC # BLD AUTO: 6.8 X10E3/UL (ref 3.4–10.8)

## 2021-02-23 ENCOUNTER — OFFICE VISIT (OUTPATIENT)
Dept: INTERNAL MEDICINE CLINIC | Age: 66
End: 2021-02-23
Payer: MEDICAID

## 2021-02-23 VITALS
HEIGHT: 64 IN | BODY MASS INDEX: 34.49 KG/M2 | TEMPERATURE: 98.1 F | SYSTOLIC BLOOD PRESSURE: 142 MMHG | OXYGEN SATURATION: 99 % | WEIGHT: 202 LBS | DIASTOLIC BLOOD PRESSURE: 77 MMHG | HEART RATE: 88 BPM | RESPIRATION RATE: 16 BRPM

## 2021-02-23 DIAGNOSIS — Z23 ENCOUNTER FOR IMMUNIZATION: ICD-10-CM

## 2021-02-23 DIAGNOSIS — E78.00 HYPERCHOLESTEREMIA: ICD-10-CM

## 2021-02-23 DIAGNOSIS — I10 ESSENTIAL HYPERTENSION WITH GOAL BLOOD PRESSURE LESS THAN 130/80: Primary | ICD-10-CM

## 2021-02-23 DIAGNOSIS — E55.9 HYPOVITAMINOSIS D: ICD-10-CM

## 2021-02-23 DIAGNOSIS — E11.65 CONTROLLED TYPE 2 DIABETES MELLITUS WITH HYPERGLYCEMIA, WITHOUT LONG-TERM CURRENT USE OF INSULIN (HCC): ICD-10-CM

## 2021-02-23 PROCEDURE — 90471 IMMUNIZATION ADMIN: CPT | Performed by: INTERNAL MEDICINE

## 2021-02-23 PROCEDURE — 3052F HG A1C>EQUAL 8.0%<EQUAL 9.0%: CPT | Performed by: INTERNAL MEDICINE

## 2021-02-23 PROCEDURE — 99215 OFFICE O/P EST HI 40 MIN: CPT | Performed by: INTERNAL MEDICINE

## 2021-02-23 PROCEDURE — 90732 PPSV23 VACC 2 YRS+ SUBQ/IM: CPT | Performed by: INTERNAL MEDICINE

## 2021-02-23 RX ORDER — METFORMIN HYDROCHLORIDE 500 MG/1
500 TABLET, EXTENDED RELEASE ORAL
Qty: 30 TAB | Refills: 2 | Status: SHIPPED | OUTPATIENT
Start: 2021-02-23 | End: 2021-10-23

## 2021-02-23 NOTE — PROGRESS NOTES
RM 17    Chief Complaint   Patient presents with    Blood Pressure Check     follow up        1. Have you been to the ER, urgent care clinic since your last visit? Hospitalized since your last visit? No    2. Have you seen or consulted any other health care providers outside of the [address] [place] [address] since your last visit? Include any pap smears or colon screening. No    Health Maintenance Due   Topic Date Due    Pneumococcal 0-64 years (1 of 1 - PPSV23) 03/07/1963    Eye Exam Retinal or Dilated  03/07/1967    COVID-19 Vaccine (1 of 2) 03/07/1973    DTaP/Tdap/Td series (1 - Tdap) 03/07/1978    Shingrix Vaccine Age 50> (1 of 2) 03/07/2007    Breast Cancer Screen Mammogram  06/19/2014    PAP AKA CERVICAL CYTOLOGY  07/18/2019    Colorectal Cancer Screening Combo  12/28/2019       Abuse Screening Questionnaire 7/7/2020   Do you ever feel afraid of your partner? N   Are you in a relationship with someone who physically or mentally threatens you? N   Is it safe for you to go home? Y       3 most recent PHQ Screens 1/26/2021   Little interest or pleasure in doing things Not at all   Feeling down, depressed, irritable, or hopeless Not at all   Total Score PHQ 2 0       Learning Assessment 6/18/2020   PRIMARY LEARNER Patient   HIGHEST LEVEL OF EDUCATION - PRIMARY LEARNER  -   BARRIERS PRIMARY LEARNER NONE   PRIMARY LANGUAGE ENGLISH   LEARNER PREFERENCE PRIMARY LISTENING     READING     VIDEOS     DEMONSTRATION   ANSWERED BY patient   RELATIONSHIP SELF     Immunization/s administered 2/23/2021 by Pineda Pinedo LPN with guardian's consent. Patient tolerated procedure well. No reactions noted. VIS provided.

## 2021-02-23 NOTE — PROGRESS NOTES
Cristina Siddiqui (: 1955) is a 63 y.o. female, established patient, here for evaluation of the following chief complaint(s):  Chief Complaint   Patient presents with    Blood Pressure Check     follow up        Assessment and Plan:       ICD-10-CM ICD-9-CM    1. Essential hypertension with goal blood pressure less than 130/80  I10 401.9    2. Encounter for immunization  Z23 V03.89 PNEUMOCOCCAL POLYSACCHARIDE VACCINE, 23-VALENT, ADULT OR IMMUNOSUPPRESSED PT DOSE,   3. Controlled type 2 diabetes mellitus with hyperglycemia, without long-term current use of insulin (HCC)  E11.65 250.80 metFORMIN ER (GLUCOPHAGE XR) 500 mg tablet     790.29    4. Hypercholesteremia  E78.00 272.0    5. Hypovitaminosis D  E55.9 268.9        1. Continue current medications as reviewed. 2.  Immunization(s) reviewed and updated at visit. 3.  Starting metformin reviewed. She is reluctant somewhat, but reviewed SE's and dosing and monitoring. Family member had problems with medication. 4,5:  Future labs reviewed. On this date 21 I have spent 55 minutes reviewing previous notes, test results and face to face with the patient discussing the diagnosis and importance of compliance with the treatment plan as well as documenting on the day of the visit. Follow-up and Dispositions    · Return in about 3 months (around 2021) for Blood Pressure follow-up, medication follow-up, non-fasting labs. lab results and schedule of future lab studies reviewed with patient  reviewed medications and side effects in detail    For additional documentation of information and/or recommendations discussed this visit, please see notes in instructions. Plan and evaluation (above) reviewed with pt at visit  Patient voiced understanding of plan and provided with time to ask/review questions. After Visit Summary (AVS) provided to pt after visit with additional instructions as needed/reviewed.         Future Appointments Date Time Provider Brittany Oneilli   3/23/2021 11:00 AM Carmen Avilez MD [facility] BS AMB   5/25/2021  1:30 PM Robert Stark MD [facility] BS AMB         History of Present Illness:     Notes (nursing/rooming note copied below in italics):  As above. Here for follow-up. Notes no problems with addition of chlorthalidone last visit. BP Readings from Last 3 Encounters:   02/23/21 (!) 142/77   01/26/21 (!) 155/77   06/18/20 172/82     Wt Readings from Last 3 Encounters:   02/23/21 202 lb (91.6 kg)   01/26/21 201 lb (91.2 kg)   06/18/20 200 lb 2 oz (90.8 kg)     She notes at home 198 lbs. Home monitoring reviewed. She had problems with dispensed auto-lancet device--notes she has been able to review with pharmacy who explained how to use the auto-lancet device. She has made appt for eye exam and gyn exam early March 2021. Nursing screenings reviewed by provider at visit. Prior to Admission medications    Medication Sig Start Date End Date Taking? Authorizing Provider   Contour Next EZ Meter misc  1/8/21  Yes Provider, Historical   chlorthalidone (HYGROTON) 25 mg tablet Take 1 Tab by mouth daily. 1/26/21  Yes Robert Stark MD   fluticasone propionate (FLONASE) 50 mcg/actuation nasal spray 2 Sprays by Both Nostrils route daily. 1/8/21  Yes Robert Stark MD   glucose blood VI test strips (ASCENSIA AUTODISC VI, ONE TOUCH ULTRA TEST VI) strip Test blood sugar 1-2 times daily. Dx:  DM type 2 with hyperglycemia. Dispense insurance-covered meter/strips. 1/8/21  Yes Robert Stark MD   lancets misc Test blood sugar 1-2 times daily. Dx:  DM type 2 with hyperglycemia. Dispense insurance-covered meter/strips. 1/8/21  Yes Robert Stark MD   amLODIPine (NORVASC) 10 mg tablet Take 1 Tab by mouth daily. 12/26/20  Yes Robert Stark MD   lisinopriL (PRINIVIL, ZESTRIL) 40 mg tablet Take 0.5 Tabs by mouth two (2) times a day.  12/26/20  Yes Jerry Valencia, Shona Carranza MD   cetirizine (ZYRTEC) 10 mg tablet Take 1 Tab by mouth nightly. 7/7/20  Yes Jan Clifton MD   melatonin 5 mg cap capsule Take 1 Cap by mouth nightly. May increase to 10mg (2 capsules) in 5-7 days as needed for sleep. 7/7/20  Yes Jan Clifton MD   multivitamin (ONE A DAY) tablet Take 1 Tab by mouth daily. Yes Provider, Historical   Blood-Glucose Meter monitoring kit Test blood sugar 1-2 times daily. Dx:  DM type 2 with hyperglycemia. Dispense insurance-covered meter/strips. 1/8/21   Jan Clifton MD   OTHER Dispense blood pressure monitoring kit to pt. Please ensure non-wrist cuff device, with appropriately sized cuff to allow for accurate home BP monitoring. 6/18/20   Jan Clifton MD        ROS    Vitals:    02/23/21 1408   BP: (!) 142/77   Pulse: 88   Resp: 16   Temp: 98.1 °F (36.7 °C)   TempSrc: Oral   SpO2: 99%   Weight: 202 lb (91.6 kg)   Height: 5' 4\" (1.626 m)   PainSc:   0 - No pain      Body mass index is 34.67 kg/m². Physical Exam:     Physical Exam  Vitals signs and nursing note reviewed. Constitutional:       General: She is not in acute distress. Appearance: Normal appearance. She is well-developed. She is not diaphoretic. HENT:      Head: Normocephalic and atraumatic. Eyes:      General: No scleral icterus. Right eye: No discharge. Left eye: No discharge. Conjunctiva/sclera: Conjunctivae normal.   Neck:      Musculoskeletal: Neck supple. No neck rigidity or muscular tenderness. Cardiovascular:      Rate and Rhythm: Normal rate and regular rhythm. Pulses: Normal pulses. Heart sounds: Normal heart sounds. No murmur. No friction rub. No gallop. Pulmonary:      Effort: Pulmonary effort is normal. No respiratory distress. Breath sounds: Normal breath sounds. No stridor. No wheezing or rhonchi. Abdominal:      General: Bowel sounds are normal.      Palpations: Abdomen is soft.       Tenderness: There is no abdominal tenderness. Musculoskeletal:         General: No deformity or signs of injury. Right lower leg: No edema. Left lower leg: No edema. Lymphadenopathy:      Cervical: No cervical adenopathy. Skin:     General: Skin is warm. Coloration: Skin is not jaundiced or pale. Findings: No bruising, erythema or rash. Neurological:      General: No focal deficit present. Mental Status: She is alert. Motor: No abnormal muscle tone. Coordination: Coordination normal.      Gait: Gait normal.   Psychiatric:         Mood and Affect: Mood normal.         Behavior: Behavior normal.         Thought Content: Thought content normal.         Judgment: Judgment normal.         An electronic signature was used to authenticate this note.   -- Shannon Calloway MD

## 2021-02-23 NOTE — PATIENT INSTRUCTIONS
1. COVID Vaccine Information as of February 2021:    Background: [address] [place] [address] is receiving limited supplies of COVID-19 vaccine and is scheduling vaccination appointments for individuals according to the phased recommendations that are currently in place. You can find the phased recommendations here on the [facility]) website:  [email or web address]     How to Know When Vaccine Available: The initial notification went out to MyChart patients on January 18, 2021. The most effective way to receive timely notifications in the future from our clinic and [address] [place] [address] will be through [facility] We can help you sign-up in clinic anytime or you can visit the [address] [place] [address] website and sign up via the Patient Portal tab. We are working on other ways to get you this information, if you do not have [facility]. Locations to Receive Vaccine with [address] [place] [address]: The vaccines are going to be given in locations that are separate from our clinic at [facility] at this time. The first site that has opened to administer the vaccines is: [address] [place] [address]. At this time, vaccines are also being administered at the:   [facility])  [address] [facility]    Scheduling an appointment:  If you would like to receive the vaccine, please call [phone] to make your appointment. There is limited availability and we will be scheduling patients in the order in which they call until our current vaccine supply is allocated. You must have an appointment in order to receive the vaccine. Clinics cannot accommodate walk-ins. Other ways to get COVID vaccines:  [facility]:    [email or web address]    Thank you for allowing [facility] to be your trusted health care partner! 2.   For low vitamin D, please take vitamin D supplement as cholecalciferol 2,000 units daily. Will repeat level with your next labs in 6mo. Learning About Vitamin D  Why is it important to get enough vitamin D? Your body needs vitamin D to absorb calcium. Calcium keeps your bones and muscles, including your heart, healthy and strong. If your muscles don't get enough calcium, they can cramp, hurt, or feel weak. You may have long-term (chronic) muscle aches and pains. If you don't get enough vitamin D throughout life, you have an increased chance of having thin and brittle bones (osteoporosis) in your later years. Children who don't get enough vitamin D may not grow as much as others their age. They also have a chance of getting a rare disease called rickets. It causes weak bones. Vitamin D and calcium are added to many foods. And your body uses sunshine to make its own vitamin D. How much vitamin D do you need? The [place] [employer] recommends that people ages 3 through 79 get 600 IU (international units) every day. Adults 71 and older need 800 IU every day. Blood tests for vitamin D can check your vitamin D level. But there is no standard normal range used by all laboratories. You're likely getting enough vitamin D if your levels are in the range of 20 to 50 ng/mL. How can you get more vitamin D? Foods that contain vitamin D include:  · [place], tuna, and mackerel. These are some of the best foods to eat when you need to get more vitamin D.  · Cheese, egg yolks, and beef liver. These foods have vitamin D in small amounts. · Milk, soy drinks, orange juice, yogurt, margarine, and some kinds of cereal have vitamin D added to them. Some people don't make vitamin D as well as others. They may have to take extra care in getting enough vitamin D. Things that reduce how much vitamin D your body makes include:  · Dark skin, such as many  Americans have. · Age, especially if you are older than 72.   · Digestive problems, such as Crohn's or celiac disease. · Liver and kidney disease. Some people who do not get enough vitamin D may need supplements. Are there any risks from taking vitamin D?  · Too much vitamin D:  ? Can damage your kidneys. ? Can cause nausea and vomiting, constipation, and weakness. ? Raises the amount of calcium in your blood. If this happens, you can get confused or have an irregular heart rhythm. · Vitamin D may interact with other medicines. Tell your doctor about all of the medicines you take, including over-the-counter drugs, herbs, and pills. Tell your doctor about all of your current medical problems. Where can you learn more? Go to http://www.vasquez.com/  Enter V530 in the search box to learn more about \"Learning About Vitamin D.\"  Current as of: August 22, 2019               Content Version: 12.6  © 3527-9198 [employer]. Care instructions adapted under license by [employer] (which disclaims liability or warranty for this information). If you have questions about a medical condition or this instruction, always ask your healthcare professional. [address] any warranty or liability for your use of this information.

## 2021-03-23 ENCOUNTER — OFFICE VISIT (OUTPATIENT)
Dept: OBGYN CLINIC | Age: 66
End: 2021-03-23
Payer: MEDICAID

## 2021-03-23 VITALS
HEIGHT: 64 IN | SYSTOLIC BLOOD PRESSURE: 126 MMHG | BODY MASS INDEX: 33.8 KG/M2 | DIASTOLIC BLOOD PRESSURE: 76 MMHG | WEIGHT: 198 LBS

## 2021-03-23 DIAGNOSIS — Z01.419 WELL WOMAN EXAM: ICD-10-CM

## 2021-03-23 DIAGNOSIS — Z12.31 ENCOUNTER FOR SCREENING MAMMOGRAM FOR MALIGNANT NEOPLASM OF BREAST: Primary | ICD-10-CM

## 2021-03-23 DIAGNOSIS — Z11.51 SPECIAL SCREENING EXAMINATION FOR HUMAN PAPILLOMAVIRUS (HPV): ICD-10-CM

## 2021-03-23 DIAGNOSIS — N81.9 FEMALE GENITAL PROLAPSE, UNSPECIFIED TYPE: ICD-10-CM

## 2021-03-23 PROCEDURE — 99386 PREV VISIT NEW AGE 40-64: CPT | Performed by: OBSTETRICS & GYNECOLOGY

## 2021-03-23 NOTE — PATIENT INSTRUCTIONS
Pelvic Exam: Care Instructions  Your Care Instructions     When your doctor examines all of your pelvic organs, it's called a pelvic exam. Two good reasons to have this kind of exam are to check for sexually transmitted infections (STIs) and to get a Pap test. A Pap test is also called a Pap smear. It checks for early changes that can lead to cancer of the cervix. Sometimes a pelvic exam is part of a regular checkup. Your doctor may ask you to avoid vaginal sex, tampons, vaginal medicines, vaginal sprays or powders, and douching for 1 to 2 days before the test.  Other times, women have this kind of exam at any time of the month. This is because they have pelvic pain, bleeding, or discharge. Or they may have another pelvic problem. Before your exam, it's important to share some information with your doctor. For example, if you are a survivor of rape or sexual abuse, you can talk about any concerns you may have. Your doctor will also want to know if you are pregnant or use birth control. And he or she will want to hear about any problems, surgeries, or procedures you have had in your pelvic area. You will also need to tell your doctor when your last period was. Follow-up care is a key part of your treatment and safety. Be sure to make and go to all appointments, and call your doctor if you are having problems. It's also a good idea to know your test results and keep a list of the medicines you take. How is a pelvic exam done? · During a pelvic exam, you will:  ? Take off your clothes below the waist. You will get a paper or cloth cover to put over the lower half of your body. If this is regular checkup, you may undress completely and put on a gown. ? Lie on your back on an exam table. Your feet will be raised above you. Stirrups will support your feet. · The doctor will:  ? Ask you to relax your knees. Your knees need to lean out, toward the walls. ?  Check the opening of your vagina for sores or swelling. ? Gently put a tool called a speculum into your vagina. It opens the vagina a little bit. You will feel some pressure. But if you are relaxed, it will not hurt. It lets your doctor see inside the vagina. ? Use a small brush, spatula, or swab to get a sample of cells, if you are having a Pap test or culture. The doctor then removes the speculum. ? Put on gloves and put one or two fingers of one hand into your vagina. The other hand goes on your lower belly. This lets your doctor feel your pelvic organs. You will probably feel some pressure. Try to stay relaxed. ? Put one gloved finger into your rectum and one into your vagina, if needed. This can also help check your pelvic organs. This exam takes about 10 minutes. At the end, you will get a washcloth or tissue to clean your vaginal area. You can then get dressed. Why is a pelvic exam done? A pelvic exam may be done:  · As part of a woman's regular physical checkup. The exam may include a Pap test.  · To check for vaginal infection. · To check for sexually transmitted infections, such as chlamydia or herpes. · To help find the cause of abnormal uterine bleeding. · To look for problems like uterine fibroids, ovarian cysts, or uterine prolapse. · To find the cause of pelvic or belly pain. · Before inserting an intrauterine device (IUD) for birth control. · To collect evidence if you've been sexually assaulted. What are the risks of a pelvic exam?  There is a small chance that the doctor will find something on a pelvic exam that would not have caused a problem. This is called overdiagnosis. It could lead to tests or treatment you don't need. When should you call for help? Watch closely for changes in your health, and be sure to contact your doctor if you have any problems. Where can you learn more? Go to http://www.gray.com/  Enter M421 in the search box to learn more about \"Pelvic Exam: Care Instructions. \"  Current as of: November 8, 2019               Content Version: 12.6  © 2780-9512 [employer]. Care instructions adapted under license by [employer] (which disclaims liability or warranty for this information). If you have questions about a medical condition or this instruction, always ask your healthcare professional. [address] any warranty or liability for your use of this information.

## 2021-03-23 NOTE — PROGRESS NOTES
Annual exam    Heladio Bianchi is a 61 y.o. presenting for annual exam. Doing well. Overdue for pap, mammo, and colonoscopy. Some prolapse symptoms (mild pelvic pressure), but minimally bothersome. No other concerns today. Pt with 4 children (including set of twins), 7 grandchildren, enjoys being outdoors. Retired. Ob/Gyn Hx:   A1 -4  (twins) 1 EAB  Menopause- age 50  ? PMB-denies  ? VMS-denies  ? Vag dryness-denies  ? HRT-denies  STI- denies  ? SA-not currently    Health maintenance:  Pap-16 NILM HPV Neg, no hx abnl  Mammo-overdue  Colonoscopy- overdue  Dexa-denies    Past Medical History:   Diagnosis Date    Hepatic steatosis     CT imaging.  Hypertension     Pap smear for cervical cancer screening 2016    Normal with negative HPV--repeat 2yr with gyn. History reviewed. No pertinent surgical history.     Family History   Problem Relation Age of Onset    Heart Disease Father     Hypertension Father     Glaucoma Father     Other Mother         ulcers       Social History     Socioeconomic History    Marital status: LEGALLY      Spouse name: Not on file    Number of children: Not on file    Years of education: Not on file    Highest education level: Not on file   Occupational History    Not on file   Social Needs    Financial resource strain: Not on file    Food insecurity     Worry: Not on file     Inability: Not on file   [email or web address] needs     Medical: Not on file     Non-medical: Not on file   Tobacco Use    Smoking status: Never Smoker    Smokeless tobacco: Never Used   Substance and Sexual Activity    Alcohol use: Not Currently     Alcohol/week: 0.0 standard drinks     Comment: occasionally    Drug use: No    Sexual activity: Not Currently   Lifestyle    Physical activity     Days per week: Not on file     Minutes per session: Not on file    Stress: Not on file   Relationships    Social connections     Talks on phone: Not on file     Gets together: Not on file     Attends Shinto service: Not on file     Active member of club or organization: Not on file     Attends meetings of clubs or organizations: Not on file     Relationship status: Not on file    Intimate partner violence     Fear of current or ex partner: Not on file     Emotionally abused: Not on file     Physically abused: Not on file     Forced sexual activity: Not on file   Other Topics Concern    Not on file   Social History Narrative    Not on file       Current Outpatient Medications   Medication Sig Dispense Refill    chlorthalidone (HYGROTON) 25 mg tablet Take 1 Tab by mouth daily. 30 Tab 2    fluticasone propionate (FLONASE) 50 mcg/actuation nasal spray 2 Sprays by Both Nostrils route daily. 1 Bottle 5    amLODIPine (NORVASC) 10 mg tablet Take 1 Tab by mouth daily. 90 Tab 0    lisinopriL (PRINIVIL, ZESTRIL) 40 mg tablet Take 0.5 Tabs by mouth two (2) times a day. 90 Tab 0    metFORMIN ER (GLUCOPHAGE XR) 500 mg tablet Take 1 Tab by mouth daily (with dinner). 30 Tab 2    Contour Next EZ Meter misc       glucose blood VI test strips (ASCENSIA AUTODISC VI, ONE TOUCH ULTRA TEST VI) strip Test blood sugar 1-2 times daily. Dx:  DM type 2 with hyperglycemia. Dispense insurance-covered meter/strips. 100 Strip 5    lancets misc Test blood sugar 1-2 times daily. Dx:  DM type 2 with hyperglycemia. Dispense insurance-covered meter/strips. 100 Each 5    Blood-Glucose Meter monitoring kit Test blood sugar 1-2 times daily. Dx:  DM type 2 with hyperglycemia. Dispense insurance-covered meter/strips. 1 Kit 0    cetirizine (ZYRTEC) 10 mg tablet Take 1 Tab by mouth nightly. 90 Tab 3    melatonin 5 mg cap capsule Take 1 Cap by mouth nightly. May increase to 10mg (2 capsules) in 5-7 days as needed for sleep. 50 Cap 1    multivitamin (ONE A DAY) tablet Take 1 Tab by mouth daily.  OTHER Dispense blood pressure monitoring kit to pt.   Please ensure non-wrist cuff device, with appropriately sized cuff to allow for accurate home BP monitoring. 1 Each 0       Allergies   Allergen Reactions    Naprosyn [Naproxen] Other (comments)     Hallucinations. Tolerates Ibuprofen without problems.        Review of Systems - History obtained from the patient  Constitutional: negative for weight loss, fever, night sweats  HEENT: negative for hearing loss, earache, congestion, snoring, sorethroat  CV: negative for chest pain, palpitations, edema  Resp: negative for cough, shortness of breath, wheezing  GI: negative for change in bowel habits, abdominal pain, black or bloody stools  : negative for frequency, dysuria, hematuria, vaginal discharge, +pelvic pressure/prolapse  MSK: negative for back pain, joint pain, muscle pain  Breast: negative for breast lumps, nipple discharge, galactorrhea  Skin :negative for itching, rash, hives  Neuro: negative for dizziness, headache, confusion, weakness  Psych: negative for anxiety, depression, change in mood  Heme/lymph: negative for bleeding, bruising, pallor    Physical Exam  Visit Vitals  /76   Ht 5' 4\" (1.626 m)   Wt 198 lb (89.8 kg)   BMI 33.99 kg/m²     Constitutional  · Appearance: well-nourished, well developed, alert, in no acute distress    HENT  · Head and Face: appears normal    Neck  · Inspection/Palpation: normal appearance, no masses or tenderness  · Lymph Nodes: no lymphadenopathy present  · Thyroid: gland size normal, nontender, no nodules or masses present on palpation    Chest  · Respiratory Effort: non-labored breathing  · Auscultation: CTAB, normal breath sounds    Cardiovascular  · Heart:  · Auscultation: regular rate and rhythm without murmur  · Extremities: no peripheral edema    Breasts  · Inspection of Breasts: breasts symmetrical, no skin changes, no discharge present, nipple appearance normal, no skin retraction present  · Palpation of Breasts and Axillae: no masses present on palpation, no breast tenderness  · Axillary Lymph Nodes: no lymphadenopathy present    Gastrointestinal  · Abdominal Examination: abdomen non-tender to palpation, normal bowel sounds, no masses present  · Liver and spleen: no hepatomegaly present, spleen not palpable  · Hernias: no hernias identified    Genitourinary  · External Genitalia: normal appearance for age, no discharge present, no tenderness present, no inflammatory lesions present, no masses present, +atrophy of UG mucosa  · Vagina: +stage 3 POP, with cervix distal to hymen and visible at introitus, no discharge present, no inflammatory lesions present, no masses present  · Bladder: non-tender to palpation  · Urethra: appears normal  · Cervix: normal   · Uterus: normal size, shape and consistency, small, mobile  · Adnexa: no adnexal tenderness present, no adnexal masses present  · Perineum: perineum within normal limits, no evidence of trauma, no rashes or skin lesions present    Skin  · General Inspection: no rash, no lesions identified    Neurologic/Psychiatric  · Mental Status:  · Orientation: grossly oriented to person, place and time  · Mood and Affect: mood normal, affect appropriate      Assessment/Plan:  61 y.o. postmenopausal female presenting for annual exam. Overall doing well. +stage 3 prolapse, minimally bothersome.     Health Maintenance:  -counseled re: diet, exercise, healthy lifestyle  -pap/HPV today  -declines STI testing  -refer for mammo  -refer for colonoscopy    Stage 3 POP:  -reviewed options for management of prolapse, if becomes more bothersome, pt would like to return for pessary fitting    RTC: 1 year for AE or sooner prjah Alvarenga MD  3/23/2021  1:03 PM

## 2021-03-27 LAB
CYTOLOGIST CVX/VAG CYTO: NORMAL
CYTOLOGY CVX/VAG DOC CYTO: NORMAL
CYTOLOGY CVX/VAG DOC THIN PREP: NORMAL
DX ICD CODE: NORMAL
HPV I/H RISK 4 DNA CVX QL PROBE+SIG AMP: NEGATIVE
Lab: NORMAL
OTHER STN SPEC: NORMAL
STAT OF ADQ CVX/VAG CYTO-IMP: NORMAL

## 2021-03-29 ENCOUNTER — HOSPITAL ENCOUNTER (EMERGENCY)
Age: 66
Discharge: HOME | End: 2021-03-29
Attending: EMERGENCY MEDICINE
Payer: MEDICAID

## 2021-03-29 VITALS
SYSTOLIC BLOOD PRESSURE: 165 MMHG | TEMPERATURE: 98.1 F | HEIGHT: 64 IN | BODY MASS INDEX: 34.1 KG/M2 | DIASTOLIC BLOOD PRESSURE: 82 MMHG | OXYGEN SATURATION: 100 % | WEIGHT: 199.74 LBS | HEART RATE: 83 BPM | RESPIRATION RATE: 16 BRPM

## 2021-03-29 DIAGNOSIS — L25.9 CONTACT DERMATITIS, UNSPECIFIED CONTACT DERMATITIS TYPE, UNSPECIFIED TRIGGER: Primary | ICD-10-CM

## 2021-03-29 PROCEDURE — 74011636637 HC RX REV CODE- 636/637: Performed by: PHYSICIAN ASSISTANT

## 2021-03-29 PROCEDURE — 99283 EMERGENCY DEPT VISIT LOW MDM: CPT

## 2021-03-29 RX ORDER — TRIAMCINOLONE ACETONIDE 1 MG/G
CREAM TOPICAL 2 TIMES DAILY
Qty: 15 G | Refills: 0 | Status: SHIPPED | OUTPATIENT
Start: 2021-03-29 | End: 2022-06-08 | Stop reason: CLARIF

## 2021-03-29 RX ORDER — PREDNISONE 20 MG/1
60 TABLET ORAL
Status: COMPLETED | OUTPATIENT
Start: 2021-03-29 | End: 2021-03-29

## 2021-03-29 RX ORDER — PREDNISONE 10 MG/1
TABLET ORAL
Qty: 21 TAB | Refills: 0 | Status: SHIPPED | OUTPATIENT
Start: 2021-03-29 | End: 2021-05-25

## 2021-03-29 RX ADMIN — PREDNISONE 60 MG: 20 TABLET ORAL at 21:41

## 2021-03-30 DIAGNOSIS — I10 ESSENTIAL HYPERTENSION WITH GOAL BLOOD PRESSURE LESS THAN 130/80: ICD-10-CM

## 2021-03-30 NOTE — ED PROVIDER NOTES
EMERGENCY DEPARTMENT HISTORY AND PHYSICAL EXAM      Date: 3/29/2021  Patient Name: Hank Leos    History of Presenting Illness     Chief Complaint   Patient presents with   Marlen Bee     left eye lid swelling onset saturday above and below the eye; started like a burning; now with noted rash on right side cheek bone. +itching       History Provided By: Patient    HPI: Hank Leos, 61 y.o. female with a history of hypertension presents ambulatory to the ED with cc of couple of days of mild but constant redness and itching of the skin around the left eye skin of the face for which she seen some but no lasting improvement with OTC Benadryl. She tells me she was cleaning out the shed and working outside over the weekend. It was Saturday when she noticed some swelling, redness and itching of the skin around her left eye and face. It was when she woke up on Sunday that the swelling was most pronounced and that is when she started taking Benadryl. Denies any new medications. She is fairly certain there was no insect bite. There has been no swelling of the lips, tongue or difficulty swallowing. She has been well lately without fever. She tells me there is no eye irritation, per se. She denies any vision changes. There has been no neck pain. Symptoms are limited to the skin of her face and are not elsewhere. There are no other complaints, changes, or physical findings at this time. PCP: Molly Torrez MD    Current Outpatient Medications   Medication Sig Dispense Refill    triamcinolone acetonide (KENALOG) 0.1 % topical cream Apply  to affected area two (2) times a day. use thin layer 15 g 0    predniSONE (STERAPRED DS) 10 mg dose pack Per Dose Pack instructions 21 Tab 0    metFORMIN ER (GLUCOPHAGE XR) 500 mg tablet Take 1 Tab by mouth daily (with dinner). 30 Tab 2    Contour Next EZ Meter misc       chlorthalidone (HYGROTON) 25 mg tablet Take 1 Tab by mouth daily.  30 Tab 2    fluticasone propionate (FLONASE) 50 mcg/actuation nasal spray 2 Sprays by Both Nostrils route daily. 1 Bottle 5    glucose blood VI test strips (ASCENSIA AUTODISC VI, ONE TOUCH ULTRA TEST VI) strip Test blood sugar 1-2 times daily. Dx:  DM type 2 with hyperglycemia. Dispense insurance-covered meter/strips. 100 Strip 5    lancets misc Test blood sugar 1-2 times daily. Dx:  DM type 2 with hyperglycemia. Dispense insurance-covered meter/strips. 100 Each 5    Blood-Glucose Meter monitoring kit Test blood sugar 1-2 times daily. Dx:  DM type 2 with hyperglycemia. Dispense insurance-covered meter/strips. 1 Kit 0    amLODIPine (NORVASC) 10 mg tablet Take 1 Tab by mouth daily. 90 Tab 0    lisinopriL (PRINIVIL, ZESTRIL) 40 mg tablet Take 0.5 Tabs by mouth two (2) times a day. 90 Tab 0    cetirizine (ZYRTEC) 10 mg tablet Take 1 Tab by mouth nightly. 90 Tab 3    melatonin 5 mg cap capsule Take 1 Cap by mouth nightly. May increase to 10mg (2 capsules) in 5-7 days as needed for sleep. 50 Cap 1    multivitamin (ONE A DAY) tablet Take 1 Tab by mouth daily.  OTHER Dispense blood pressure monitoring kit to pt. Please ensure non-wrist cuff device, with appropriately sized cuff to allow for accurate home BP monitoring. 1 Each 0     Past History     Past Medical History:  Past Medical History:   Diagnosis Date    Hepatic steatosis 2013    CT imaging.  Hypertension     Pap smear for cervical cancer screening 07/2016    Normal with negative HPV--repeat 2yr with gyn. Past Surgical History:  No past surgical history on file.     Family History:  Family History   Problem Relation Age of Onset    Heart Disease Father     Hypertension Father     Glaucoma Father     Other Mother         ulcers       Social History:  Social History     Tobacco Use    Smoking status: Never Smoker    Smokeless tobacco: Never Used   Substance Use Topics    Alcohol use: Not Currently     Alcohol/week: 0.0 standard drinks Comment: occasionally    Drug use: No       Allergies: Allergies   Allergen Reactions    Naprosyn [Naproxen] Other (comments)     Hallucinations. Tolerates Ibuprofen without problems. Review of Systems   Review of Systems   Constitutional: Negative for fatigue and fever. HENT: Negative for ear pain and sore throat. Eyes: Negative for pain, redness and visual disturbance. Itching and redness of the skin around the left eye   Respiratory: Negative for cough and shortness of breath. Cardiovascular: Negative for chest pain and palpitations. Gastrointestinal: Negative for abdominal pain, nausea and vomiting. Genitourinary: Negative for dysuria, frequency and urgency. Musculoskeletal: Negative for back pain, gait problem, neck pain and neck stiffness. Skin: Negative for rash and wound. Neurological: Negative for dizziness, weakness, light-headedness, numbness and headaches. Physical Exam   Physical Exam  Vitals signs and nursing note reviewed. Constitutional:       General: She is not in acute distress. Appearance: She is well-developed. She is not toxic-appearing. HENT:      Head: Normocephalic and atraumatic. Jaw: No trismus. Right Ear: External ear normal.      Left Ear: External ear normal.      Nose: Nose normal.      Mouth/Throat:      Pharynx: Uvula midline. Eyes:      General: No scleral icterus. Conjunctiva/sclera: Conjunctivae normal.      Pupils: Pupils are equal, round, and reactive to light. Comments:   Mild periorbital edema and lichenification of the skin about the left eye. There is no visible urticaria. Sclera are white and there is no conjunctival injection nor chemosis. PERRL. EOMI. Neck:      Musculoskeletal: Full passive range of motion without pain and normal range of motion. Cardiovascular:      Rate and Rhythm: Normal rate and regular rhythm.    Pulmonary:      Effort: Pulmonary effort is normal. No tachypnea, accessory muscle usage or respiratory distress. Breath sounds: No decreased breath sounds or wheezing. Abdominal:      Palpations: Abdomen is soft. Tenderness: There is no abdominal tenderness. Musculoskeletal: Normal range of motion. Skin:     Findings: No rash. Neurological:      Mental Status: She is alert and oriented to person, place, and time. She is not disoriented. GCS: GCS eye subscore is 4. GCS verbal subscore is 5. GCS motor subscore is 6. Cranial Nerves: No cranial nerve deficit. Psychiatric:         Speech: Speech normal.       Diagnostic Study Results     Labs -   No results found for this or any previous visit (from the past 12 hour(s)). Radiologic Studies -   No orders to display     CT Results  (Last 48 hours)    None        CXR Results  (Last 48 hours)    None        Medical Decision Making   I am the first provider for this patient. I reviewed the vital signs, available nursing notes, past medical history, past surgical history, family history and social history. Vital Signs-Reviewed the patient's vital signs. Patient Vitals for the past 12 hrs:   Temp Pulse Resp BP SpO2   03/29/21 1844 98.1 °F (36.7 °C) 83 16 (!) 165/82 100 %       Pulse Oximetry Analysis - 100% on RA    Records Reviewed: Nursing Notes, Old Medical Records, Previous Radiology Studies and Previous Laboratory Studies    Provider Notes (Medical Decision Making): Afebrile; well-appearing. Presentation most consistent with a contact or allergic dermatitis. There is no evidence of anaphylaxis or angioedema. There is no specific eye involvement, per se. Will offer oral and topical steroids and have patient continue to take oral antihistamines. Refer to primary care. Return precautions. ED Course:   Initial assessment performed. The patients presenting problems have been discussed, and they are in agreement with the care plan formulated and outlined with them.   I have encouraged them to ask questions as they arise throughout their visit. Disposition:  Discharge    PLAN:  1. Discharge Medication List as of 3/29/2021  9:36 PM      START taking these medications    Details   triamcinolone acetonide (KENALOG) 0.1 % topical cream Apply  to affected area two (2) times a day. use thin layer, Normal, Disp-15 g, R-0      predniSONE (STERAPRED DS) 10 mg dose pack Per Dose Pack instructions, Normal, Disp-21 Tab, R-0         CONTINUE these medications which have NOT CHANGED    Details   metFORMIN ER (GLUCOPHAGE XR) 500 mg tablet Take 1 Tab by mouth daily (with dinner). , Print, Disp-30 Tab, R-2      Contour Next EZ Meter misc Historical Med, JUANI      chlorthalidone (HYGROTON) 25 mg tablet Take 1 Tab by mouth daily. , Normal, Disp-30 Tab, R-2      fluticasone propionate (FLONASE) 50 mcg/actuation nasal spray 2 Sprays by Both Nostrils route daily. , Normal, Disp-1 Bottle, R-5      glucose blood VI test strips (ASCENSIA AUTODISC VI, ONE TOUCH ULTRA TEST VI) strip Test blood sugar 1-2 times daily. Dx:  DM type 2 with hyperglycemia. Dispense insurance-covered meter/strips., Normal, Disp-100 Strip, R-5      lancets misc Test blood sugar 1-2 times daily. Dx:  DM type 2 with hyperglycemia. Dispense insurance-covered meter/strips., Normal, Disp-100 Each, R-5      Blood-Glucose Meter monitoring kit Test blood sugar 1-2 times daily. Dx:  DM type 2 with hyperglycemia. Dispense insurance-covered meter/strips., Normal, Disp-1 Kit, R-0      amLODIPine (NORVASC) 10 mg tablet Take 1 Tab by mouth daily. , Normal, Disp-90 Tab, R-0Needs office visit      lisinopriL (PRINIVIL, ZESTRIL) 40 mg tablet Take 0.5 Tabs by mouth two (2) times a day., Normal, Disp-90 Tab, R-0Needs office visit      cetirizine (ZYRTEC) 10 mg tablet Take 1 Tab by mouth nightly., Normal, Disp-90 Tab, R-3      melatonin 5 mg cap capsule Take 1 Cap by mouth nightly.  May increase to 10mg (2 capsules) in 5-7 days as needed for sleep., Normal, Disp-50 Cap, R-1 multivitamin (ONE A DAY) tablet Take 1 Tab by mouth daily. , Historical Med      OTHER Dispense blood pressure monitoring kit to pt. Please ensure non-wrist cuff device, with appropriately sized cuff to allow for accurate home BP monitoring., Print, Disp-1 Each, R-0           2. Follow-up Information     Follow up With Specialties Details Why Contact Info    Joseph Sharma MD Infectious Disease Call in 2 days PRIMARY CARE: follow up in 2 days if no improvement or any worsening [address] [place] [address] [id number] [phone]          Return to ED if worse     Diagnosis     Clinical Impression:   1.  Contact dermatitis, unspecified contact dermatitis type, unspecified trigger

## 2021-03-30 NOTE — ED NOTES
Pt discharged by Rosemary. Pt provided with discharge instructions RX and instructions on follow up care. Pt ambulated out of ED with no apparent difficulty accompanied by RN.

## 2021-03-31 ENCOUNTER — HOSPITAL ENCOUNTER (OUTPATIENT)
Dept: MAMMOGRAPHY | Age: 66
Discharge: HOME | End: 2021-03-31
Attending: OBSTETRICS & GYNECOLOGY
Payer: MEDICAID

## 2021-03-31 DIAGNOSIS — Z12.31 ENCOUNTER FOR SCREENING MAMMOGRAM FOR MALIGNANT NEOPLASM OF BREAST: ICD-10-CM

## 2021-03-31 PROCEDURE — 77063 BREAST TOMOSYNTHESIS BI: CPT

## 2021-04-01 NOTE — TELEPHONE ENCOUNTER
Last visit 02/23/2021 MD Reeder Payment   Next appointment 05/25/2021 MD Reeder Payment   Previous refill encounter(s)   12/26/2020:   - Prinivil #90,   - Norvasc #90     Requested Prescriptions     Pending Prescriptions Disp Refills    amLODIPine (NORVASC) 10 mg tablet [Pharmacy Med Name: AMLODIPINE 10 MG TAB[*]] 90 Tab 1     Sig: Take 1 Tab by mouth daily.  lisinopriL (PRINIVIL, ZESTRIL) 40 mg tablet [Pharmacy Med Name: LISINOPRIL 40 MG TAB[*]] 90 Tab 1     Sig: Take 0.5 Tabs by mouth two (2) times a day.

## 2021-04-08 RX ORDER — AMLODIPINE BESYLATE 10 MG/1
10 TABLET ORAL DAILY
Qty: 90 TAB | Refills: 1 | Status: SHIPPED | OUTPATIENT
Start: 2021-04-08 | End: 2021-04-10 | Stop reason: SDUPTHER

## 2021-04-08 RX ORDER — LISINOPRIL 40 MG/1
20 TABLET ORAL 2 TIMES DAILY
Qty: 90 TAB | Refills: 1 | Status: SHIPPED | OUTPATIENT
Start: 2021-04-08 | End: 2021-04-10 | Stop reason: SDUPTHER

## 2021-04-09 NOTE — TELEPHONE ENCOUNTER
Refill request(s) approved--amlodipine, lisinopril. Refill protocol details (computer-generated) reviewed, as available.

## 2021-04-10 DIAGNOSIS — I10 ESSENTIAL HYPERTENSION WITH GOAL BLOOD PRESSURE LESS THAN 130/80: ICD-10-CM

## 2021-04-10 RX ORDER — AMLODIPINE BESYLATE 10 MG/1
10 TABLET ORAL DAILY
Qty: 90 TAB | Refills: 1 | Status: SHIPPED | OUTPATIENT
Start: 2021-04-10 | End: 2021-10-25 | Stop reason: SDUPTHER

## 2021-04-10 RX ORDER — LISINOPRIL 40 MG/1
20 TABLET ORAL 2 TIMES DAILY
Qty: 90 TAB | Refills: 1 | Status: SHIPPED | OUTPATIENT
Start: 2021-04-10 | End: 2021-10-25 | Stop reason: SDUPTHER

## 2021-04-10 RX ORDER — CHLORTHALIDONE 25 MG/1
25 TABLET ORAL DAILY
Qty: 90 TAB | Refills: 0 | Status: SHIPPED | OUTPATIENT
Start: 2021-04-10 | End: 2021-08-09 | Stop reason: SDUPTHER

## 2021-04-10 NOTE — PROGRESS NOTES
On-Call Note  Received call from patient. [facility] did not receive medication refill authorized on 8th. Called [facility]. They confirmed e-scribe not received. - though our system does show it was sent and confirmed. Re-escribed with 90 day fill of chlorthalidone. Confirmed receipt. Called patient and notified.      Clarissa Farris MD

## 2021-04-18 RX ORDER — LISINOPRIL 40 MG/1
TABLET ORAL
Qty: 90 TAB | Refills: 0 | OUTPATIENT
Start: 2021-04-18

## 2021-04-18 RX ORDER — AMLODIPINE BESYLATE 10 MG/1
TABLET ORAL
Qty: 90 TAB | Refills: 0 | OUTPATIENT
Start: 2021-04-18

## 2021-04-28 ENCOUNTER — TELEPHONE (OUTPATIENT)
Dept: OBGYN CLINIC | Age: 66
End: 2021-04-28

## 2021-04-28 NOTE — TELEPHONE ENCOUNTER
Advised patient of negative mammo results but indicative of dense breast tissue. If any concerns of any new founded areas, please call us immediately to investigate for her.

## 2021-05-25 ENCOUNTER — OFFICE VISIT (OUTPATIENT)
Dept: INTERNAL MEDICINE CLINIC | Age: 66
End: 2021-05-25
Payer: MEDICAID

## 2021-05-25 VITALS
HEART RATE: 81 BPM | TEMPERATURE: 98.3 F | SYSTOLIC BLOOD PRESSURE: 126 MMHG | BODY MASS INDEX: 33.8 KG/M2 | WEIGHT: 198 LBS | OXYGEN SATURATION: 98 % | DIASTOLIC BLOOD PRESSURE: 74 MMHG | RESPIRATION RATE: 15 BRPM | HEIGHT: 64 IN

## 2021-05-25 DIAGNOSIS — E11.65 CONTROLLED TYPE 2 DIABETES MELLITUS WITH HYPERGLYCEMIA, WITHOUT LONG-TERM CURRENT USE OF INSULIN (HCC): ICD-10-CM

## 2021-05-25 DIAGNOSIS — I10 ESSENTIAL HYPERTENSION WITH GOAL BLOOD PRESSURE LESS THAN 130/80: Primary | ICD-10-CM

## 2021-05-25 DIAGNOSIS — E55.9 HYPOVITAMINOSIS D: ICD-10-CM

## 2021-05-25 LAB — HBA1C MFR BLD HPLC: 9.2 % (ref 4.8–5.6)

## 2021-05-25 PROCEDURE — 99214 OFFICE O/P EST MOD 30 MIN: CPT | Performed by: INTERNAL MEDICINE

## 2021-05-25 PROCEDURE — 83036 HEMOGLOBIN GLYCOSYLATED A1C: CPT | Performed by: INTERNAL MEDICINE

## 2021-05-25 NOTE — PROGRESS NOTES
RM 16    Chief Complaint   Patient presents with    Blood Pressure Check    Medication Evaluation    Labs     Patient not fasting       1. Have you been to the ER, urgent care clinic since your last visit? Hospitalized since your last visit? No    2. Have you seen or consulted any other health care providers outside of the [address] [place] [address] since your last visit? Include any pap smears or colon screening. No    Results for orders placed or performed in visit on 05/25/21   AMB POC HEMOGLOBIN A1C   Result Value Ref Range    Hemoglobin A1c (POC) 9.2 (A) 4.8 - 5.6 %       Health Maintenance Due   Topic Date Due    Eye Exam Retinal or Dilated  Never done    COVID-19 Vaccine (1) Never done    Colorectal Cancer Screening Combo  12/28/2019    MICROALBUMIN Q1  06/18/2021       Abuse Screening Questionnaire 5/25/2021   Do you ever feel afraid of your partner? N   Are you in a relationship with someone who physically or mentally threatens you? N   Is it safe for you to go home? Y       3 most recent PHQ Screens 5/25/2021   Little interest or pleasure in doing things Not at all   Feeling down, depressed, irritable, or hopeless Not at all   Total Score PHQ 2 0     Fall Risk Assessment, last 12 mths 2/23/2021   Able to walk? Yes   Fall in past 12 months? 0   Do you feel unsteady?  0   Are you worried about falling 0       Learning Assessment 6/18/2020   PRIMARY LEARNER Patient   HIGHEST LEVEL OF EDUCATION - PRIMARY LEARNER  -   BARRIERS PRIMARY LEARNER NONE   PRIMARY LANGUAGE ENGLISH   LEARNER PREFERENCE PRIMARY LISTENING     READING     VIDEOS     DEMONSTRATION   ANSWERED BY patient   RELATIONSHIP SELF

## 2021-05-25 NOTE — PROGRESS NOTES
Viki Hardy (: 1955) is a 61 y.o. female, established patient, here for evaluation of the following chief complaint(s):  Chief Complaint   Patient presents with    Blood Pressure Check    Medication Evaluation    Labs     Patient not fasting       Assessment and Plan:       ICD-10-CM ICD-9-CM    1. Essential hypertension with goal blood pressure less than 130/80  I10 401.9    2. Controlled type 2 diabetes mellitus with hyperglycemia, without long-term current use of insulin (HCC)  E11.65 250.80 AMB POC HEMOGLOBIN A1C     790.29    3. Hypovitaminosis D  E55.9 268.9        1. Continued medications and follow-up reviewed. 2.  Monitoring A1c reviewed as below. She agreed to start metformin (has filled but not taken) as discussed at visit and prior visit. Follow-up and Dispositions    · Return in about 4 weeks (around 2021) for medication follow-up.       lab results and schedule of future lab studies reviewed with patient  reviewed medications and side effects in detail    For additional documentation of information and/or recommendations discussed this visit, please see notes in instructions. Plan and evaluation (above) reviewed with pt at visit  Patient voiced understanding of plan and provided with time to ask/review questions. After Visit Summary (AVS) provided to pt after visit with additional instructions as needed/reviewed. Future Appointments   Date Time Provider Brittany Olivera   2021  1:30 PM Rojas Collier MD [facility] AMB   --Updated future visits after patient check-out. History of Present Illness:     Notes (nursing/rooming note copied below in italics):  As above    LOV 2021.   Started metformin (as XR product with plan 500mg daily with dinner) then--she notes filled but didn't take    Lab Results   Component Value Date/Time    Hemoglobin A1c 8.3 (H) 2021 08:23 AM       Wt Readings from Last 3 Encounters:   21 198 lb (89.8 kg) 03/29/21 199 lb 11.8 oz (90.6 kg)   03/23/21 198 lb (89.8 kg)     She has tried to adjust diet for decreased carbs and starches. She has marielle exercising more also. BP Readings from Last 3 Encounters:   05/25/21 (!) 145/84   03/29/21 (!) 165/82   03/23/21 126/76     She has taken meds and took last night on schedule. She has photo from allergic reaction for which she rec'd prednisone, and topical eye moisturizing drop and TAC topically. Photo is of right face (cheek and periorbital area). Episode resolved with mgt above. ED [facility] was on 3-29-21. She notes dry mouth at night. Reviewed could humidify air in room, use chin strap to keep mouth closed/prevent mouth breathing, or address sinus congestion. She then noted she has dental pain and causing sinus pain also--plans to see dentist to address. She has eye exam records from Dr. Emiliana Bianchi from March 2021 and March 2020 for records here. Exam with Dr. Doyle Mendez 3-2-21. Nursing screenings reviewed by provider at visit. Prior to Admission medications    Medication Sig Start Date End Date Taking? Authorizing Provider   chlorthalidone (HYGROTON) 25 mg tablet Take 1 Tab by mouth daily. 4/10/21  Yes Alley Min MD   lisinopriL (PRINIVIL, ZESTRIL) 40 mg tablet Take 0.5 Tabs by mouth two (2) times a day. 4/10/21  Yes Alley Min MD   amLODIPine (NORVASC) 10 mg tablet Take 1 Tab by mouth daily. 4/10/21  Yes Alley Min MD   triamcinolone acetonide (KENALOG) 0.1 % topical cream Apply  to affected area two (2) times a day. use thin layer 3/29/21  Yes Genell Gilford, PA   Contour Next EZ Meter misc  1/8/21  Yes Provider, Historical   fluticasone propionate (FLONASE) 50 mcg/actuation nasal spray 2 Sprays by Both Nostrils route daily. 1/8/21  Yes Sahra Yap MD   glucose blood VI test strips (ASCENSIA AUTODISC VI, ONE TOUCH ULTRA TEST VI) strip Test blood sugar 1-2 times daily.   Dx:  DM type 2 with hyperglycemia. Dispense insurance-covered meter/strips. 1/8/21  Yes Hanh Brown MD   lancets misc Test blood sugar 1-2 times daily. Dx:  DM type 2 with hyperglycemia. Dispense insurance-covered meter/strips. 1/8/21  Yes Hanh Brown MD   Blood-Glucose Meter monitoring kit Test blood sugar 1-2 times daily. Dx:  DM type 2 with hyperglycemia. Dispense insurance-covered meter/strips. 1/8/21  Yes Hanh Brown MD   cetirizine (ZYRTEC) 10 mg tablet Take 1 Tab by mouth nightly. 7/7/20  Yes Hanh Brown MD   melatonin 5 mg cap capsule Take 1 Cap by mouth nightly. May increase to 10mg (2 capsules) in 5-7 days as needed for sleep. 7/7/20  Yes Hanh Brown MD   multivitamin (ONE A DAY) tablet Take 1 Tab by mouth daily. Yes Provider, Historical   metFORMIN ER (GLUCOPHAGE XR) 500 mg tablet Take 1 Tab by mouth daily (with dinner). Patient not taking: Reported on 5/25/2021 2/23/21   Hanh Brown MD   OTHER Dispense blood pressure monitoring kit to pt. Please ensure non-wrist cuff device, with appropriately sized cuff to allow for accurate home BP monitoring. Patient not taking: Reported on 5/25/2021 6/18/20   Hanh Brown MD        ROS    Vitals:    05/25/21 1356 05/25/21 1413   BP: (!) 145/84 126/74   Pulse: 81    Resp: 15    Temp: 98.3 °F (36.8 °C)    TempSrc: Oral    SpO2: 98%    Weight: 198 lb (89.8 kg)    Height: 5' 4\" (1.626 m)    PainSc:   0 - No pain       Body mass index is 33.99 kg/m². Physical Exam:     Physical Exam  Vitals and nursing note reviewed. Constitutional:       General: She is not in acute distress. Appearance: Normal appearance. She is well-developed. She is not diaphoretic. HENT:      Head: Normocephalic and atraumatic. Eyes:      General: No scleral icterus. Right eye: No discharge. Left eye: No discharge.       Conjunctiva/sclera: Conjunctivae normal.   Cardiovascular:      Rate and Rhythm: Normal rate and regular rhythm. Pulses: Normal pulses. Heart sounds: Normal heart sounds. No murmur heard. No friction rub. No gallop. Pulmonary:      Effort: Pulmonary effort is normal. No respiratory distress. Breath sounds: Normal breath sounds. No stridor. No wheezing, rhonchi or rales. Abdominal:      General: Bowel sounds are normal. There is no distension. Palpations: Abdomen is soft. Tenderness: There is no abdominal tenderness. There is no guarding. Musculoskeletal:         General: No swelling, tenderness, deformity or signs of injury. Right lower leg: No edema. Left lower leg: No edema. Skin:     General: Skin is warm. Coloration: Skin is not jaundiced or pale. Findings: No bruising, erythema or rash. Neurological:      General: No focal deficit present. Mental Status: She is alert. Motor: No abnormal muscle tone. Coordination: Coordination normal.      Gait: Gait normal.   Psychiatric:         Mood and Affect: Mood normal.         Behavior: Behavior normal.         Thought Content: Thought content normal.         Judgment: Judgment normal.         Results for orders placed or performed in visit on 05/25/21   AMB POC HEMOGLOBIN A1C   Result Value Ref Range    Hemoglobin A1c (POC) 9.2 (A) 4.8 - 5.6 %             A1c              eAg  9.1 214   9.2 217   9.3 220   9.4 223   9.5 226   9.6 229   9.7 232   9.8 235   9.9 237   10.0 240         An electronic signature was used to authenticate this note.   -- Gerald Peralta MD

## 2021-05-25 NOTE — PROGRESS NOTES
RM  
 
No chief complaint on file. 1. Have you been to the ER, urgent care clinic since your last visit? Hospitalized since your last visit? No 
 
2. Have you seen or consulted any other health care providers outside of the [address] [place] [address] since your last visit? Include any pap smears or colon screening. No 
 
Health Maintenance Due Topic Date Due  Eye Exam Retinal or Dilated  Never done  COVID-19 Vaccine (1) Never done  Colorectal Cancer Screening Combo  12/28/2019  MICROALBUMIN Q1  06/18/2021 Abuse Screening Questionnaire 7/7/2020 Do you ever feel afraid of your partner? Ladi Prayer Are you in a relationship with someone who physically or mentally threatens you? Ladi Prayer Is it safe for you to go home? Y  
 
 
3 most recent PHQ Screens 2/23/2021 Little interest or pleasure in doing things Not at all Feeling down, depressed, irritable, or hopeless Not at all Total Score PHQ 2 0 Learning Assessment 6/18/2020 PRIMARY LEARNER Patient HIGHEST LEVEL OF EDUCATION - PRIMARY LEARNER  -  
BARRIERS PRIMARY LEARNER NONE PRIMARY LANGUAGE ENGLISH  
LEARNER PREFERENCE PRIMARY LISTENING  
  READING  
  VIDEOS  
  DEMONSTRATION  
ANSWERED BY patient RELATIONSHIP SELF

## 2021-05-25 NOTE — PATIENT INSTRUCTIONS
1.    Results for orders placed or performed in visit on 05/25/21   AMB POC HEMOGLOBIN A1C   Result Value Ref Range    Hemoglobin A1c (POC) 9.2 (A) 4.8 - 5.6 %       The hemoglobin A1c value above correlates with an average blood sugar/glucose of 217. Please start the metformin as reviewed. 2.  You may call the [facility] directly to set up an appt at one of the numbers below:  --[facility] Location:  [phone]. --[facility] Location:  [address] [place] [address] Location:  [address] [place] [address] Location:  [phone].

## 2021-08-07 DIAGNOSIS — Z91.09 ENVIRONMENTAL ALLERGIES: ICD-10-CM

## 2021-08-07 RX ORDER — CETIRIZINE HCL 10 MG
TABLET ORAL
Qty: 90 TABLET | Refills: 3 | Status: SHIPPED | OUTPATIENT
Start: 2021-08-07 | End: 2022-04-06 | Stop reason: SDUPTHER

## 2021-08-09 DIAGNOSIS — I10 ESSENTIAL HYPERTENSION WITH GOAL BLOOD PRESSURE LESS THAN 130/80: ICD-10-CM

## 2021-08-09 NOTE — TELEPHONE ENCOUNTER
MD Fernando Galan for refill chlorthalidone. Thanks, Epifanio Antunez    Last Visit: 5/25/21 MD Norberta Cooks  Next Appointment: Patient cancelled 6/24/21- Not re-scheduled-medication f/u was due 6/22/21  Previous Refill Encounter(s): 4/10/21 90    Requested Prescriptions     Pending Prescriptions Disp Refills    chlorthalidone (HYGROTON) 25 mg tablet 90 Tablet 0     Sig: Take 1 Tablet by mouth daily.

## 2021-08-13 RX ORDER — CHLORTHALIDONE 25 MG/1
25 TABLET ORAL DAILY
Qty: 90 TABLET | Refills: 1 | Status: SHIPPED | OUTPATIENT
Start: 2021-08-13 | End: 2022-03-17

## 2021-08-14 NOTE — TELEPHONE ENCOUNTER
Refill request(s) approved--chlorthalidone. Refill protocol details (computer-generated) reviewed, as available.

## 2021-09-01 NOTE — PROGRESS NOTES
RM 15     Chief Complaint   Patient presents with    Complete Physical     Patient fasting. 1. Have you been to the ER, urgent care clinic since your last visit? Hospitalized since your last visit? No     2. Have you seen or consulted any other health care providers outside of the [address] [place] [address] since your last visit? Include any pap smears or colon screening. No    Health Maintenance Due   Topic Date Due    Hepatitis C Screening  1955    DTaP/Tdap/Td series (1 - Tdap) 03/07/1978    Shingrix Vaccine Age 50> (1 of 2) 03/07/2007    Breast Cancer Screen Mammogram  06/19/2014    PAP AKA CERVICAL CYTOLOGY  07/18/2019    Colonoscopy  12/28/2019     Abuse Screening Questionnaire 6/18/2020   Do you ever feel afraid of your partner? N   Are you in a relationship with someone who physically or mentally threatens you? N   Is it safe for you to go home?  Y     3 most recent PHQ Screens 6/18/2020   Little interest or pleasure in doing things Not at all   Feeling down, depressed, irritable, or hopeless Several days   Total Score PHQ 2 1     Learning Assessment 6/18/2020   PRIMARY LEARNER Patient   HIGHEST LEVEL OF EDUCATION - PRIMARY LEARNER  -   BARRIERS PRIMARY LEARNER NONE   PRIMARY LANGUAGE ENGLISH   LEARNER PREFERENCE PRIMARY LISTENING     READING     VIDEOS     DEMONSTRATION   ANSWERED BY patient   RELATIONSHIP SELF show

## 2021-10-23 ENCOUNTER — PATIENT MESSAGE (OUTPATIENT)
Dept: INTERNAL MEDICINE CLINIC | Age: 66
End: 2021-10-23

## 2021-10-23 DIAGNOSIS — I10 ESSENTIAL HYPERTENSION WITH GOAL BLOOD PRESSURE LESS THAN 130/80: ICD-10-CM

## 2021-10-23 DIAGNOSIS — E11.65 CONTROLLED TYPE 2 DIABETES MELLITUS WITH HYPERGLYCEMIA, WITHOUT LONG-TERM CURRENT USE OF INSULIN (HCC): ICD-10-CM

## 2021-10-23 RX ORDER — METFORMIN HYDROCHLORIDE 500 MG/1
TABLET, EXTENDED RELEASE ORAL
Qty: 30 TABLET | Refills: 2 | Status: SHIPPED | OUTPATIENT
Start: 2021-10-23 | End: 2022-03-31

## 2021-10-24 NOTE — TELEPHONE ENCOUNTER
At May 2021 visit, pt indicated not taking metformin. Lab Results   Component Value Date/Time    Hemoglobin A1c 8.3 (H) 01/29/2021 08:23 AM    Hemoglobin A1c (POC) 9.2 (A) 05/25/2021 02:23 PM     Refill request(s) approved--metformin. Refill protocol details (computer-generated) reviewed, as available. Requested Prescriptions     Signed Prescriptions Disp Refills    metFORMIN ER (GLUCOPHAGE XR) 500 mg tablet 30 Tablet 2     Sig: TAKE ONE TABLET BY MOUTH ONE TIME DAILY WITH DINNER     Authorizing Provider: Stefania Hathaway message to pt--to schedule follow-up appt.

## 2021-10-25 NOTE — TELEPHONE ENCOUNTER
Please contact patient for scheduling. Thanks      Last visit 05/25/2021 MD Petar Mckeon  Next appointment Canceled Appt 06/24/2021 - Nothing rescheduled   Previous refill encounter(s)   04/10/2021:  - Norvasc #90 with 1 refill,  - Prinivil #90 with 1 refill     Requested Prescriptions     Pending Prescriptions Disp Refills    amLODIPine (NORVASC) 10 mg tablet 30 Tablet 0     Sig: Take 1 Tablet by mouth daily.  lisinopriL (PRINIVIL, ZESTRIL) 40 mg tablet 30 Tablet 0     Sig: Take 0.5 Tablets by mouth two (2) times a day.

## 2021-10-27 NOTE — TELEPHONE ENCOUNTER
Writer spoke with pt and informed her that her medication had not been approved and that she needed to make an IO appt with . Pt is scheduled to see  on 11/8/21 @ 10:00 am pt would like if  could bridge her enough of her Norvasc 10 mg as well as her Lisinopril 40 mg until her appt?

## 2021-11-04 RX ORDER — AMLODIPINE BESYLATE 10 MG/1
10 TABLET ORAL DAILY
Qty: 30 TABLET | Refills: 0 | Status: SHIPPED | OUTPATIENT
Start: 2021-11-04 | End: 2021-12-12

## 2021-11-04 RX ORDER — LISINOPRIL 40 MG/1
20 TABLET ORAL 2 TIMES DAILY
Qty: 30 TABLET | Refills: 0 | Status: SHIPPED | OUTPATIENT
Start: 2021-11-04 | End: 2021-12-12

## 2021-11-04 NOTE — TELEPHONE ENCOUNTER
Refill request(s) approved--lisinopril, amlodipine--30-day supply with 0 refill(s). Refill protocol details (computer-generated) reviewed, as available.     Future Appointments   Date Time Provider Brittany Olivera   11/8/2021 10:00 AM Loly Sawant MD [facility] BS AMB

## 2021-11-24 ENCOUNTER — OFFICE VISIT (OUTPATIENT)
Dept: INTERNAL MEDICINE CLINIC | Age: 66
End: 2021-11-24

## 2021-11-24 NOTE — PROGRESS NOTES
RM    Chief Complaint   Patient presents with    Medication Evaluation       There were no vitals taken for this visit. 3 most recent PHQ Screens 5/25/2021   Little interest or pleasure in doing things Not at all   Feeling down, depressed, irritable, or hopeless Not at all   Total Score PHQ 2 0       Health Maintenance Due   Topic Date Due    COVID-19 Vaccine (1) Never done    Eye Exam Retinal or Dilated  Never done    Colorectal Cancer Screening Combo  12/28/2019    MICROALBUMIN Q1  06/18/2021    A1C test (Diabetic or Prediabetic)  08/25/2021    Flu Vaccine (1) 09/01/2021       Learning Assessment 6/18/2020   PRIMARY LEARNER Patient   HIGHEST LEVEL OF EDUCATION - PRIMARY LEARNER  -   BARRIERS PRIMARY LEARNER NONE   PRIMARY LANGUAGE ENGLISH   LEARNER PREFERENCE PRIMARY LISTENING     READING     VIDEOS     DEMONSTRATION   ANSWERED BY patient   RELATIONSHIP SELF       AVS  education, follow up, and recommendations provided and addressed with patient.

## 2022-01-14 ENCOUNTER — OFFICE VISIT (OUTPATIENT)
Dept: INTERNAL MEDICINE CLINIC | Age: 67
End: 2022-01-14
Payer: MEDICAID

## 2022-01-14 ENCOUNTER — TELEPHONE (OUTPATIENT)
Dept: INTERNAL MEDICINE CLINIC | Age: 67
End: 2022-01-14

## 2022-01-14 VITALS
HEART RATE: 82 BPM | WEIGHT: 187 LBS | TEMPERATURE: 98.5 F | BODY MASS INDEX: 31.92 KG/M2 | SYSTOLIC BLOOD PRESSURE: 137 MMHG | HEIGHT: 64 IN | OXYGEN SATURATION: 95 % | DIASTOLIC BLOOD PRESSURE: 74 MMHG

## 2022-01-14 DIAGNOSIS — G62.9 NEUROPATHY: ICD-10-CM

## 2022-01-14 DIAGNOSIS — E11.65 CONTROLLED TYPE 2 DIABETES MELLITUS WITH HYPERGLYCEMIA, WITHOUT LONG-TERM CURRENT USE OF INSULIN (HCC): Primary | ICD-10-CM

## 2022-01-14 DIAGNOSIS — I10 ESSENTIAL HYPERTENSION WITH GOAL BLOOD PRESSURE LESS THAN 130/80: ICD-10-CM

## 2022-01-14 DIAGNOSIS — Z23 NEEDS FLU SHOT: ICD-10-CM

## 2022-01-14 DIAGNOSIS — E55.9 HYPOVITAMINOSIS D: ICD-10-CM

## 2022-01-14 DIAGNOSIS — E78.00 HYPERCHOLESTEREMIA: ICD-10-CM

## 2022-01-14 LAB — HBA1C MFR BLD HPLC: 7.8 %

## 2022-01-14 PROCEDURE — 99215 OFFICE O/P EST HI 40 MIN: CPT | Performed by: INTERNAL MEDICINE

## 2022-01-14 PROCEDURE — 83036 HEMOGLOBIN GLYCOSYLATED A1C: CPT | Performed by: INTERNAL MEDICINE

## 2022-01-14 PROCEDURE — 3051F HG A1C>EQUAL 7.0%<8.0%: CPT | Performed by: INTERNAL MEDICINE

## 2022-01-14 PROCEDURE — 90686 IIV4 VACC NO PRSV 0.5 ML IM: CPT | Performed by: INTERNAL MEDICINE

## 2022-01-14 NOTE — TELEPHONE ENCOUNTER
----- Message from Lashay Weber sent at 12/28/2021  8:06 AM EST -----  Subject: Message to Provider    QUESTIONS  Information for Provider? Patent would like to know if you have already   had covid 19 months ago, should you still be tested as of today if you   have been exposed, please call and advise.  ---------------------------------------------------------------------------  --------------  CALL BACK INFO  What is the best way for the office to contact you? OK to leave message on   voicemail  Preferred Call Back Phone Number? [phone]  ---------------------------------------------------------------------------  --------------  SCRIPT ANSWERS  Relationship to Patient?  Self

## 2022-01-14 NOTE — PROGRESS NOTES
Valeria Vergara (: 1955) is a 61 y.o. female, established patient, here for evaluation of the following chief complaint(s):  Chief Complaint   Patient presents with    Diabetes       Assessment and Plan:       ICD-10-CM ICD-9-CM    1. Controlled type 2 diabetes mellitus with hyperglycemia, without long-term current use of insulin (HCC)  E11.65 250.80 AMB POC HEMOGLOBIN A1C     790.29 CBC WITH AUTOMATED DIFF      METABOLIC PANEL, COMPREHENSIVE      LIPID PANEL      MICROALBUMIN, UR, RAND W/ MICROALB/CREAT RATIO      HM DIABETES FOOT EXAM   2. Essential hypertension with goal blood pressure less than 130/80  I10 401.9 CBC WITH AUTOMATED DIFF      METABOLIC PANEL, COMPREHENSIVE      OTHER   3. Hypovitaminosis D  G96.4 822.2 METABOLIC PANEL, COMPREHENSIVE      VITAMIN D, 25 HYDROXY   4. Hypercholesteremia  E78.00 272.0 LIPID PANEL      CK   5. Neuropathy  G62.9 355.9 VITAMIN B12      FOLATE      TSH 3RD GENERATION      T4, FREE   6. Needs flu shot  Z23 V04.81 INFLUENZA VIRUS VAC QUAD,SPLIT,PRESV FREE SYRINGE IM       1-4:  Lab monitoring reviewed. Continue current medications pending lab results/review. 5.  Lab evaluation reviewed. 6.  Immunization(s) reviewed and updated at visit. Follow-up and Dispositions    · Return in about 3 months (around 2022) for Blood Pressure follow-up, Diabetes follow-up, non-fasting labs--3-4mo. lab results and schedule of future lab studies reviewed with patient  reviewed medications and side effects in detail    For additional documentation of information and/or recommendations discussed this visit, please see notes in instructions. Plan and evaluation (above) reviewed with pt at visit  Patient voiced understanding of plan and provided with time to ask/review questions. After Visit Summary (AVS) provided to pt after visit with additional instructions as needed/reviewed.       Future Appointments   Date Time Provider Brittany Olivera   2022 10:45 AM Kristian Mcneal MD [facility] AM[facility]   --Updated future visits after patient check-out. On this date 01/14/2022 I have spent 41 minutes reviewing previous notes, test results and face to face with the patient discussing the diagnosis and importance of compliance with the treatment plan as well as documenting on the day of the visit. History of Present Illness:     Notes (nursing/rooming note copied below in italics):  Pt is fasting    Here for follow-up and fasting labs. Notes has been working on her diet and exercise. She has resources she needs to work on and manage. Lab Results   Component Value Date/Time    Cholesterol, total 223 (H) 01/29/2021 08:23 AM    HDL Cholesterol 49 01/29/2021 08:23 AM    LDL, calculated 145 (H) 01/29/2021 08:23 AM    LDL, calculated 159 (H) 06/18/2020 11:28 AM    VLDL, calculated 29 01/29/2021 08:23 AM    VLDL, calculated 35 06/18/2020 11:28 AM    Triglyceride 161 (H) 01/29/2021 08:23 AM       Lab Results   Component Value Date/Time    Hemoglobin A1c 8.3 (H) 01/29/2021 08:23 AM    Hemoglobin A1c (POC) 9.2 (A) 05/25/2021 02:23 PM         Her grandson had COVID contact in Sept 2021, and she stopped going to gym then. Wt Readings from Last 3 Encounters:   01/14/22 187 lb (84.8 kg)   05/25/21 198 lb (89.8 kg)   03/29/21 199 lb 11.8 oz (90.6 kg)         Has some tingling in left great toe. Not related to shoes. No swelling or skin change. No problems with right foot. No prior/recent injury    Health Maintenance Due   Topic Date Due    Eye Exam Retinal or Dilated  Never done    Colorectal Cancer Screening Combo  12/28/2019    MICROALBUMIN Q1  06/18/2021    Flu Vaccine (1) 09/01/2021    COVID-19 Vaccine (3 - Booster for Moderna series) 11/04/2021    Foot Exam Q1  01/26/2022    Bone Densitometry (Dexa) Screening  03/07/2022       She will have eye exam with provider in March--done last March. She is planning [facility] booster.     Influenza updated today. Nursing screenings reviewed by provider at visit. Prior to Admission medications    Medication Sig Start Date End Date Taking? Authorizing Provider   lisinopriL (PRINIVIL, ZESTRIL) 40 mg tablet TAKE ONE-HALF TABLET BY MOUTH TWICE A DAY 12/12/21  Yes Elana Hussein MD   amLODIPine (NORVASC) 10 mg tablet TAKE ONE TABLET BY MOUTH ONE TIME DAILY 12/12/21  Yes Elana Hussein MD   metFORMIN ER (GLUCOPHAGE XR) 500 mg tablet TAKE ONE TABLET BY MOUTH ONE TIME DAILY WITH DINNER 10/23/21  Yes Elana Hussein MD   chlorthalidone (HYGROTON) 25 mg tablet Take 1 Tablet by mouth daily. 8/13/21  Yes Elana Hussein MD   cetirizine (ZYRTEC) 10 mg tablet TAKE ONE TABLET BY MOUTH NIGHTLY 8/7/21  Yes Elana Hussein MD   Contour Next EZ Meter misc  1/8/21  Yes Provider, Historical   fluticasone propionate (FLONASE) 50 mcg/actuation nasal spray 2 Sprays by Both Nostrils route daily. 1/8/21  Yes Elana Hussein MD   glucose blood VI test strips (ASCENSIA AUTODISC VI, ONE TOUCH ULTRA TEST VI) strip Test blood sugar 1-2 times daily. Dx:  DM type 2 with hyperglycemia. Dispense insurance-covered meter/strips. 1/8/21  Yes Elana Hussein MD   lancets misc Test blood sugar 1-2 times daily. Dx:  DM type 2 with hyperglycemia. Dispense insurance-covered meter/strips. 1/8/21  Yes Elana Hussein MD   Blood-Glucose Meter monitoring kit Test blood sugar 1-2 times daily. Dx:  DM type 2 with hyperglycemia. Dispense insurance-covered meter/strips. 1/8/21  Yes Elana Hussein MD   melatonin 5 mg cap capsule Take 1 Cap by mouth nightly. May increase to 10mg (2 capsules) in 5-7 days as needed for sleep. 7/7/20  Yes Elana Hussein MD   multivitamin (ONE A DAY) tablet Take 1 Tab by mouth daily. Yes Provider, Historical   OTHER Dispense blood pressure monitoring kit to pt.   Please ensure non-wrist cuff device, with appropriately sized cuff to allow for accurate home BP monitoring. 6/18/20  Yes Jai Craft MD   triamcinolone acetonide (KENALOG) 0.1 % topical cream Apply  to affected area two (2) times a day. use thin layer  Patient not taking: Reported on 1/14/2022 3/29/21   SUBHA Noriega    Vitals:    01/14/22 1012   BP: 137/74   Pulse: 82   Temp: 98.5 °F (36.9 °C)   SpO2: 95%   Weight: 187 lb (84.8 kg)   Height: 5' 4\" (1.626 m)   PainSc:   0 - No pain      Body mass index is 32.1 kg/m². Physical Exam:     Physical Exam  Vitals and nursing note reviewed. Constitutional:       General: She is not in acute distress. Appearance: Normal appearance. She is well-developed. She is not diaphoretic. HENT:      Head: Normocephalic and atraumatic. Eyes:      General: No scleral icterus. Right eye: No discharge. Left eye: No discharge. Conjunctiva/sclera: Conjunctivae normal.   Cardiovascular:      Rate and Rhythm: Normal rate and regular rhythm. Pulses: Normal pulses. Heart sounds: Normal heart sounds. No murmur heard. No friction rub. No gallop. Pulmonary:      Effort: Pulmonary effort is normal. No respiratory distress. Breath sounds: Normal breath sounds. No stridor. No wheezing, rhonchi or rales. Abdominal:      General: Bowel sounds are normal. There is no distension. Palpations: Abdomen is soft. Tenderness: There is no abdominal tenderness. There is no guarding. Musculoskeletal:         General: No swelling, tenderness, deformity or signs of injury. Cervical back: Neck supple. No rigidity. No muscular tenderness. Right lower leg: No edema. Left lower leg: No edema. Lymphadenopathy:      Cervical: No cervical adenopathy. Skin:     General: Skin is warm. Coloration: Skin is not jaundiced or pale. Findings: No bruising, erythema or rash. Neurological:      General: No focal deficit present. Mental Status: She is alert.       Motor: No abnormal muscle tone. Coordination: Coordination normal.      Gait: Gait normal.   Psychiatric:         Mood and Affect: Mood normal.         Behavior: Behavior normal.         Thought Content: Thought content normal.         Judgment: Judgment normal.     Diabetic foot exam:   Left Foot:   Visual Exam: normal.  No changes noted great toe. Pulse DP: 2+ (normal)   Filament test: normal sensation   Right Foot:   Visual Exam: normal    Pulse DP: 2+ (normal)   Filament test: normal sensation       Results for orders placed or performed in visit on 01/14/22   AMB POC HEMOGLOBIN A1C   Result Value Ref Range    Hemoglobin A1c (POC) 7.8 %             A1c              eAg  7.1 157   7.2 160   7.3 163   7.4 166   7.5 169   7.6 171   7.7 174   7.8 177   7.9 180   8.0 183         An electronic signature was used to authenticate this note.   -- Julio Subramanian MD

## 2022-01-14 NOTE — PATIENT INSTRUCTIONS
Results for orders placed or performed in visit on 01/14/22   AMB POC HEMOGLOBIN A1C   Result Value Ref Range    Hemoglobin A1c (POC) 7.8 %       1. The hemoglobin A1c value above correlates with an average blood sugar/glucose of 177.      2.  To get the new BP monitor:    [address] [place], [address]  Phone [phone]  Fax [phone]  Hours  9am - 6pm Monday - Friday  9am - 1pm Saturday      3. To make sure your home cuff is accurate:     Check your BP and heart rate. Compare these values with a validated monitor (grocery store, pharmacy, manual cuff with medical provider/clinic), to be sure the home cuff readings are accurate. To be accurate, recommend that the SBP's (systolic BP's or top numbers) be within 10 points of each other, and the DBP's (diastolic BP's or bottom numbers) to be within 5 points of each other. If your home meter is accurate, keep a log as reviewed, and will review here at your BP follow-up visit. If you note values >135/85 (either number) consistently, please return sooner to review. Recommend that you do this in the pharmacy or medical supply store prior to leaving, to make sure values at home will be accurate, as reviewed. 4.  To schedule follow-up/repeat colonoscopy:    --[facility] at [phone], [address] [place] [address] [facility] at [phone].

## 2022-01-14 NOTE — TELEPHONE ENCOUNTER
This message was received 2 weeks ago , unsure if anything is still needed or if patient was able to get the answer to her question. Left message for patient  advising her to call the office if she still has any questions or needs anything from us. If she calls back and would like to know guidelines for testing recommend testing 5 days after exposure or at the onset of symptoms.

## 2022-01-14 NOTE — PROGRESS NOTES
RM 18  Pt is fasting    Chief Complaint   Patient presents with    Diabetes       Visit Vitals  /74   Pulse 82   Temp 98.5 °F (36.9 °C)   Ht 5' 4\" (1.626 m)   Wt 187 lb (84.8 kg)   SpO2 95%   BMI 32.10 kg/m²       3 most recent PHQ Screens 1/14/2022   Little interest or pleasure in doing things Not at all   Feeling down, depressed, irritable, or hopeless Not at all   Total Score PHQ 2 0     After obtaining consent, and per orders of Dr. Blayne Pandey, injection of flu given by Dean Zapata. Patient instructed to remain in clinic for 20 minutes afterwards, and to report any adverse reaction to me immediately. 1. Have you been to the ER, urgent care clinic since your last visit? Hospitalized since your last visit? 9/29/21 to urgent care for covid test after exposure. Test negative,     2. Have you seen or consulted any other health care providers outside of the [address] [place] [address] since your last visit? Include any pap smears or colon screening. No    Health Maintenance Due   Topic Date Due    COVID-19 Vaccine (1) Never done    Eye Exam Retinal or Dilated  Never done    Colorectal Cancer Screening Combo  12/28/2019    MICROALBUMIN Q1  06/18/2021    A1C test (Diabetic or Prediabetic)  08/25/2021    Flu Vaccine (1) 09/01/2021    Foot Exam Q1  01/26/2022    Bone Densitometry (Dexa) Screening  03/07/2022       Learning Assessment 6/18/2020   PRIMARY LEARNER Patient   HIGHEST LEVEL OF EDUCATION - PRIMARY LEARNER  -   BARRIERS PRIMARY LEARNER NONE   PRIMARY LANGUAGE ENGLISH   LEARNER PREFERENCE PRIMARY LISTENING     READING     VIDEOS     DEMONSTRATION   ANSWERED BY patient   RELATIONSHIP SELF   . AVS  education, follow up, and recommendations provided and addressed with patient.   services used to advise patient -no

## 2022-01-15 LAB
25(OH)D3 SERPL-MCNC: 30.3 NG/ML (ref 30–100)
ALBUMIN SERPL-MCNC: 4.6 G/DL (ref 3.5–5)
ALBUMIN/GLOB SERPL: 1.5 {RATIO} (ref 1.1–2.2)
ALP SERPL-CCNC: 75 U/L (ref 45–117)
ALT SERPL-CCNC: 33 U/L (ref 12–78)
ANION GAP SERPL CALC-SCNC: 5 MMOL/L (ref 5–15)
AST SERPL-CCNC: 20 U/L (ref 15–37)
BASOPHILS # BLD: 0 K/UL (ref 0–0.1)
BASOPHILS NFR BLD: 0 % (ref 0–1)
BILIRUB SERPL-MCNC: 0.4 MG/DL (ref 0.2–1)
BUN SERPL-MCNC: 20 MG/DL (ref 6–20)
BUN/CREAT SERPL: 25 (ref 12–20)
CALCIUM SERPL-MCNC: 10 MG/DL (ref 8.5–10.1)
CHLORIDE SERPL-SCNC: 100 MMOL/L (ref 97–108)
CHOLEST SERPL-MCNC: 216 MG/DL
CK SERPL-CCNC: 309 U/L (ref 26–192)
CO2 SERPL-SCNC: 32 MMOL/L (ref 21–32)
CREAT SERPL-MCNC: 0.8 MG/DL (ref 0.55–1.02)
DIFFERENTIAL METHOD BLD: NORMAL
EOSINOPHIL # BLD: 0.1 K/UL (ref 0–0.4)
EOSINOPHIL NFR BLD: 1 % (ref 0–7)
ERYTHROCYTE [DISTWIDTH] IN BLOOD BY AUTOMATED COUNT: 12.9 % (ref 11.5–14.5)
FOLATE SERPL-MCNC: 14.6 NG/ML (ref 5–21)
GLOBULIN SER CALC-MCNC: 3 G/DL (ref 2–4)
GLUCOSE SERPL-MCNC: 147 MG/DL (ref 65–100)
HCT VFR BLD AUTO: 38.2 % (ref 35–47)
HDLC SERPL-MCNC: 47 MG/DL
HDLC SERPL: 4.6 {RATIO} (ref 0–5)
HGB BLD-MCNC: 12.6 G/DL (ref 11.5–16)
IMM GRANULOCYTES # BLD AUTO: 0 K/UL (ref 0–0.04)
IMM GRANULOCYTES NFR BLD AUTO: 0 % (ref 0–0.5)
LDLC SERPL CALC-MCNC: 136.8 MG/DL (ref 0–100)
LYMPHOCYTES # BLD: 2.6 K/UL (ref 0.8–3.5)
LYMPHOCYTES NFR BLD: 33 % (ref 12–49)
MCH RBC QN AUTO: 30.6 PG (ref 26–34)
MCHC RBC AUTO-ENTMCNC: 33 G/DL (ref 30–36.5)
MCV RBC AUTO: 92.7 FL (ref 80–99)
MONOCYTES # BLD: 0.6 K/UL (ref 0–1)
MONOCYTES NFR BLD: 7 % (ref 5–13)
NEUTS SEG # BLD: 4.5 K/UL (ref 1.8–8)
NEUTS SEG NFR BLD: 59 % (ref 32–75)
NRBC # BLD: 0 K/UL (ref 0–0.01)
NRBC BLD-RTO: 0 PER 100 WBC
PLATELET # BLD AUTO: 359 K/UL (ref 150–400)
PMV BLD AUTO: 9.8 FL (ref 8.9–12.9)
POTASSIUM SERPL-SCNC: 3.9 MMOL/L (ref 3.5–5.1)
PROT SERPL-MCNC: 7.6 G/DL (ref 6.4–8.2)
RBC # BLD AUTO: 4.12 M/UL (ref 3.8–5.2)
SODIUM SERPL-SCNC: 137 MMOL/L (ref 136–145)
T4 FREE SERPL-MCNC: 1.1 NG/DL (ref 0.8–1.5)
TRIGL SERPL-MCNC: 161 MG/DL (ref ?–150)
TSH SERPL DL<=0.05 MIU/L-ACNC: 0.63 UIU/ML (ref 0.36–3.74)
VIT B12 SERPL-MCNC: 478 PG/ML (ref 193–986)
VLDLC SERPL CALC-MCNC: 32.2 MG/DL
WBC # BLD AUTO: 7.9 K/UL (ref 3.6–11)

## 2022-02-02 DIAGNOSIS — J30.9 ALLERGIC SINUSITIS: ICD-10-CM

## 2022-02-06 RX ORDER — FLUTICASONE PROPIONATE 50 MCG
SPRAY, SUSPENSION (ML) NASAL
Qty: 16 G | Refills: 5 | Status: SHIPPED | OUTPATIENT
Start: 2022-02-06

## 2022-03-09 DIAGNOSIS — I10 ESSENTIAL HYPERTENSION WITH GOAL BLOOD PRESSURE LESS THAN 130/80: ICD-10-CM

## 2022-03-17 RX ORDER — CHLORTHALIDONE 25 MG/1
TABLET ORAL
Qty: 90 TABLET | Refills: 1 | Status: SHIPPED | OUTPATIENT
Start: 2022-03-17 | End: 2022-09-15 | Stop reason: SDUPTHER

## 2022-03-29 ENCOUNTER — TRANSCRIBE ORDER (OUTPATIENT)
Dept: SCHEDULING | Age: 67
End: 2022-03-29

## 2022-03-29 ENCOUNTER — TELEPHONE (OUTPATIENT)
Dept: INTERNAL MEDICINE CLINIC | Age: 67
End: 2022-03-29

## 2022-03-29 DIAGNOSIS — Z12.31 SCREENING MAMMOGRAM FOR HIGH-RISK PATIENT: Primary | ICD-10-CM

## 2022-03-29 NOTE — TELEPHONE ENCOUNTER
----- Message from Key Coles sent at 3/29/2022  9:44 AM EDT -----  Subject: Message to Provider    QUESTIONS  Information for Provider? PT is calling in to confirm appts   ---------------------------------------------------------------------------  --------------  CALL BACK INFO  What is the best way for the office to contact you? OK to leave message on   voicemail  Preferred Call Back Phone Number?  [phone]  ---------------------------------------------------------------------------  --------------  SCRIPT ANSWERS  undefined

## 2022-03-29 NOTE — TELEPHONE ENCOUNTER
Future Appointments:  Future Appointments   Date Time Provider Brittany Olivera   4/13/2022  8:30 AM [facility]   4/14/2022 10:45 AM Jai Craft MD [facility] AMB        Last Appointment With Me:  1/14/2022     Requested Prescriptions      No prescriptions requested or ordered in this encounter     Patient requested appt verification called patient no answer left VM with above appt date and time.

## 2022-03-30 DIAGNOSIS — I10 ESSENTIAL HYPERTENSION WITH GOAL BLOOD PRESSURE LESS THAN 130/80: ICD-10-CM

## 2022-03-30 DIAGNOSIS — E11.65 CONTROLLED TYPE 2 DIABETES MELLITUS WITH HYPERGLYCEMIA, WITHOUT LONG-TERM CURRENT USE OF INSULIN (HCC): ICD-10-CM

## 2022-03-30 NOTE — TELEPHONE ENCOUNTER
Pt left a voice message requesting refills. [phone]      Last visit 01/14/2022 MD Cassandra Bernstein   Next appointment 04/14/2022 MD Cassandra Bernstein   Previous refill encounter(s)   10/23/2021 Glucophage-XR #30 with 2 refills. 12/12/2021:  - Norvasc #30 with 2 refills,  - Prinivil #30 with 2 refills.      Requested Prescriptions     Pending Prescriptions Disp Refills    lisinopriL (PRINIVIL, ZESTRIL) 40 mg tablet [Pharmacy Med Name: LISINOPRIL 40 MG TAB[*]] 30 Tablet 2     Sig: TAKE ONE-HALF TABLET BY MOUTH TWICE A DAY    amLODIPine (NORVASC) 10 mg tablet [Pharmacy Med Name: AMLODIPINE 10 MG TAB[*]] 30 Tablet 2     Sig: TAKE ONE TABLET BY MOUTH ONE TIME DAILY    metFORMIN ER (GLUCOPHAGE XR) 500 mg tablet [Pharmacy Med Name: METFORMIN EXT  MG TAB(GLUCOPHAGE)] 30 Tablet 2     Sig: TAKE ONE TABLET BY MOUTH ONE TIME DAILY WITH DINNER

## 2022-03-31 RX ORDER — METFORMIN HYDROCHLORIDE 500 MG/1
TABLET, EXTENDED RELEASE ORAL
Qty: 30 TABLET | Refills: 5 | Status: SHIPPED | OUTPATIENT
Start: 2022-03-31 | End: 2022-09-15 | Stop reason: SDUPTHER

## 2022-03-31 RX ORDER — AMLODIPINE BESYLATE 10 MG/1
TABLET ORAL
Qty: 30 TABLET | Refills: 5 | Status: SHIPPED | OUTPATIENT
Start: 2022-03-31 | End: 2022-09-13 | Stop reason: SDUPTHER

## 2022-03-31 RX ORDER — LISINOPRIL 40 MG/1
TABLET ORAL
Qty: 30 TABLET | Refills: 5 | Status: SHIPPED | OUTPATIENT
Start: 2022-03-31 | End: 2022-09-13 | Stop reason: SDUPTHER

## 2022-04-06 DIAGNOSIS — Z91.09 ENVIRONMENTAL ALLERGIES: ICD-10-CM

## 2022-04-06 NOTE — TELEPHONE ENCOUNTER
Phil calling for new rx for cetirizine.  (previous rx [facility]). Thanks, Sherryle Crandall    Last Visit: 1/14/22 MD Jacquelyn Che  Next Appointment: 4/14/22 MD Jacquelyn Che  Previous Refill Encounter(s): 8/7/21 90 +3 ([facility])    Requested Prescriptions     Pending Prescriptions Disp Refills    cetirizine (ZYRTEC) 10 mg tablet 90 Tablet 3     Sig: Take 1 Tablet by mouth nightly.

## 2022-04-07 RX ORDER — CETIRIZINE HCL 10 MG
10 TABLET ORAL
Qty: 90 TABLET | Refills: 3 | Status: SHIPPED | OUTPATIENT
Start: 2022-04-07 | End: 2022-10-29

## 2022-04-13 ENCOUNTER — HOSPITAL ENCOUNTER (OUTPATIENT)
Dept: MAMMOGRAPHY | Age: 67
Discharge: HOME | End: 2022-04-13
Attending: OBSTETRICS & GYNECOLOGY
Payer: MEDICARE

## 2022-04-13 DIAGNOSIS — Z12.31 SCREENING MAMMOGRAM FOR HIGH-RISK PATIENT: ICD-10-CM

## 2022-04-13 PROCEDURE — 77063 BREAST TOMOSYNTHESIS BI: CPT

## 2022-04-14 ENCOUNTER — OFFICE VISIT (OUTPATIENT)
Dept: INTERNAL MEDICINE CLINIC | Age: 67
End: 2022-04-14
Payer: MEDICARE

## 2022-04-14 VITALS
HEART RATE: 76 BPM | HEIGHT: 64 IN | RESPIRATION RATE: 16 BRPM | TEMPERATURE: 97.9 F | DIASTOLIC BLOOD PRESSURE: 79 MMHG | OXYGEN SATURATION: 100 % | SYSTOLIC BLOOD PRESSURE: 132 MMHG | BODY MASS INDEX: 31.41 KG/M2 | WEIGHT: 184 LBS

## 2022-04-14 DIAGNOSIS — E78.00 HYPERCHOLESTEREMIA: ICD-10-CM

## 2022-04-14 DIAGNOSIS — E55.9 HYPOVITAMINOSIS D: ICD-10-CM

## 2022-04-14 DIAGNOSIS — M79.675 PAIN OF TOE OF LEFT FOOT: ICD-10-CM

## 2022-04-14 DIAGNOSIS — E11.65 CONTROLLED TYPE 2 DIABETES MELLITUS WITH HYPERGLYCEMIA, WITHOUT LONG-TERM CURRENT USE OF INSULIN (HCC): Primary | ICD-10-CM

## 2022-04-14 DIAGNOSIS — Z91.09 ENVIRONMENTAL ALLERGIES: ICD-10-CM

## 2022-04-14 PROBLEM — E11.9 TYPE 2 DIABETES MELLITUS (HCC): Status: ACTIVE | Noted: 2022-04-14

## 2022-04-14 PROCEDURE — 3017F COLORECTAL CA SCREEN DOC REV: CPT | Performed by: INTERNAL MEDICINE

## 2022-04-14 PROCEDURE — G8427 DOCREV CUR MEDS BY ELIG CLIN: HCPCS | Performed by: INTERNAL MEDICINE

## 2022-04-14 PROCEDURE — G8536 NO DOC ELDER MAL SCRN: HCPCS | Performed by: INTERNAL MEDICINE

## 2022-04-14 PROCEDURE — 1101F PT FALLS ASSESS-DOCD LE1/YR: CPT | Performed by: INTERNAL MEDICINE

## 2022-04-14 PROCEDURE — G8754 DIAS BP LESS 90: HCPCS | Performed by: INTERNAL MEDICINE

## 2022-04-14 PROCEDURE — 99215 OFFICE O/P EST HI 40 MIN: CPT | Performed by: INTERNAL MEDICINE

## 2022-04-14 PROCEDURE — 2022F DILAT RTA XM EVC RTNOPTHY: CPT | Performed by: INTERNAL MEDICINE

## 2022-04-14 PROCEDURE — G8417 CALC BMI ABV UP PARAM F/U: HCPCS | Performed by: INTERNAL MEDICINE

## 2022-04-14 PROCEDURE — G8752 SYS BP LESS 140: HCPCS | Performed by: INTERNAL MEDICINE

## 2022-04-14 PROCEDURE — G8400 PT W/DXA NO RESULTS DOC: HCPCS | Performed by: INTERNAL MEDICINE

## 2022-04-14 PROCEDURE — G9899 SCRN MAM PERF RSLTS DOC: HCPCS | Performed by: INTERNAL MEDICINE

## 2022-04-14 PROCEDURE — 3051F HG A1C>EQUAL 7.0%<8.0%: CPT | Performed by: INTERNAL MEDICINE

## 2022-04-14 PROCEDURE — G8510 SCR DEP NEG, NO PLAN REQD: HCPCS | Performed by: INTERNAL MEDICINE

## 2022-04-14 PROCEDURE — 1090F PRES/ABSN URINE INCON ASSESS: CPT | Performed by: INTERNAL MEDICINE

## 2022-04-14 RX ORDER — CEPHALEXIN 500 MG/1
CAPSULE ORAL
COMMUNITY
Start: 2022-04-11 | End: 2022-06-08 | Stop reason: CLARIF

## 2022-04-14 RX ORDER — MUPIROCIN 20 MG/G
OINTMENT TOPICAL
COMMUNITY
Start: 2022-04-11 | End: 2022-06-08 | Stop reason: CLARIF

## 2022-04-14 NOTE — PROGRESS NOTES
Theron Sykes (: 1955) is a 74 y.o. female, established patient, here for evaluation of the following chief complaint(s):  Chief Complaint   Patient presents with    Hypertension     f/u    Diabetes     f/u       Assessment and Plan:       ICD-10-CM ICD-9-CM    1. Controlled type 2 diabetes mellitus with hyperglycemia, without long-term current use of insulin (HCC)  E11.65 250.80 REFERRAL TO PODIATRY     790.29    2. Environmental allergies  Z91.09 V15.09    3. Pain of toe of left foot  M79.675 729.5 REFERRAL TO PODIATRY   4. Hypovitaminosis D  E55.9 268.9    5. Hypercholesteremia  E78.00 272.0        1,3:  Referral(s) and referral coordination reviewed with patient at visit.    2,4,5:  Future Lab monitoring reviewed. Continue current medications pending lab results/review. Follow-up and Dispositions    · Return in about 3 months (around 2022) for Welcome to Medicare exam/visit, medication follow-up, fasting labs. lab results and schedule of future lab studies reviewed with patient  reviewed medications and side effects in detail    For additional documentation of information and/or recommendations discussed this visit, please see notes in instructions. Plan and evaluation (above) reviewed with pt at visit  Patient voiced understanding of plan and provided with time to ask/review questions. After Visit Summary (AVS) provided to pt after visit with additional instructions as needed/reviewed. Future Appointments   Date Time Provider Brittany Olivera   2022  1:30 PM [id number] [facility] 2 [facility]   2022  2:00 PM [address]     --Updated future visits after patient check-out. Future Appointments   Date Time Provider Brittany Olivera   2022  9:00 AM [facility]    2022 10:45 AM Arcelia Gutierrez MD [facility] AMB       On this date 2022 I have spent 44 minutes reviewing previous notes, test results and face to face with the patient discussing the diagnosis and importance of compliance with the treatment plan as well as documenting on the day of the visit. History of Present Illness:     Notes (nursing/rooming note copied below in italics):  Seen by [facility] Monday for nose pain given muprocin and keflex  Questions about BP monitor she just ordered  Can she do 90 day med refills  Cetrizine not covered by her insurance/wants replacement med  Wants to know about vitamin D supplement and level    She has copy of last eye exam with No DM retinopathy. She wast treated for localized infection in her distal nose. She feels like improving on the antibiotic, with PRN use of ointment. Taking keflex 500mg TID x 10 days. Today is day #3 of antibiotic. She has not had problems with yeast infections on antibiotics, but no recent antibiotics. She has new BP cuff, but not with her. Just ordered it. She ordered a scale also. She noted this has changed with her Medicare enrollment, and had a $100 allotment. She will bring to follow-up to validate. Cetirizine is $12 for 30-day supply with her insurance coverage. Copays are $7 or more in catalog she has for covered products. She can get with discount in pharmacy for $6.    She has filled in [employer] prior for 30-day supply ($6).    [facility]. She had them ship it--she wasn't given a cost.  Told 90-day supply was free she thinks. She will call them today to confirm. Reviewed Vit D recommendations from prior [employer] message to pt. She had not reviewed. Printed and reviewed with her at visit. Enrolled in Medicare March-April this year. Welcome visit next visit reviewed. Nursing screenings reviewed by provider at visit. Prior to Admission medications    Medication Sig Start Date End Date Taking?  Authorizing Provider   cephALEXin (KEFLEX) 500 mg capsule  4/11/22  Yes Provider, Historical mupirocin (BACTROBAN) 2 % ointment  4/11/22  Yes Provider, Historical   cetirizine (ZYRTEC) 10 mg tablet Take 1 Tablet by mouth nightly. 4/7/22  Yes Rosalia Trujillo MD   lisinopriL (PRINIVIL, ZESTRIL) 40 mg tablet TAKE ONE-HALF TABLET BY MOUTH TWICE A DAY 3/31/22  Yes Rosalia Trujillo MD   amLODIPine (NORVASC) 10 mg tablet TAKE ONE TABLET BY MOUTH ONE TIME DAILY 3/31/22  Yes Rosalia Trujillo MD   metFORMIN ER (GLUCOPHAGE XR) 500 mg tablet TAKE ONE TABLET BY MOUTH ONE TIME DAILY WITH DINNER 3/31/22  Yes Rosalia Trujillo MD   chlorthalidone (HYGROTON) 25 mg tablet TAKE ONE TABLET BY MOUTH ONE TIME DAILY 3/17/22  Yes Rosalia Trujillo MD   fluticasone propionate (FLONASE) 50 mcg/actuation nasal spray USE TWO SPRAYS IN [facility] NOSTRIL ONE TIME DAILY 2/6/22  Yes Rosalia Trujillo MD   melatonin 5 mg cap capsule Take 1 Cap by mouth nightly. May increase to 10mg (2 capsules) in 5-7 days as needed for sleep. 7/7/20  Yes Rosalia Trujillo MD   multivitamin (ONE A DAY) tablet Take 1 Tab by mouth daily. Yes Provider, Historical   OTHER Dispense blood pressure monitoring kit to pt. Please ensure non-wrist cuff device, with appropriately sized cuff to allow for accurate home BP monitoring. Patient not taking: Reported on 4/14/2022 1/14/22   Rosalia Trujillo MD   triamcinolone acetonide (KENALOG) 0.1 % topical cream Apply  to affected area two (2) times a day. use thin layer  Patient not taking: Reported on 1/14/2022 3/29/21   SUBHA Arzola   Contour Next EZ Meter misc  1/8/21   Provider, Historical   glucose blood VI test strips (ASCENSIA AUTODISC VI, ONE TOUCH ULTRA TEST VI) strip Test blood sugar 1-2 times daily. Dx:  DM type 2 with hyperglycemia. Dispense insurance-covered meter/strips. Patient not taking: Reported on 4/14/2022 1/8/21   Rosalia Trujillo MD   lancets misc Test blood sugar 1-2 times daily. Dx:  DM type 2 with hyperglycemia.   Dispense insurance-covered meter/strips. Patient not taking: Reported on 4/14/2022 1/8/21   Santiago Zacarias MD   Blood-Glucose Meter monitoring kit Test blood sugar 1-2 times daily. Dx:  DM type 2 with hyperglycemia. Dispense insurance-covered meter/strips. Patient not taking: Reported on 4/14/2022 1/8/21   Santiago Zacarias MD   OTHER Dispense blood pressure monitoring kit to pt. Please ensure non-wrist cuff device, with appropriately sized cuff to allow for accurate home BP monitoring. Patient not taking: Reported on 4/14/2022 6/18/20   Santiago Zacarias MD        ROS    Vitals:    04/14/22 1106   BP: 132/79   Pulse: 76   Resp: 16   Temp: 97.9 °F (36.6 °C)   TempSrc: Oral   SpO2: 100%   Weight: 184 lb (83.5 kg)   Height: 5' 4\" (1.626 m)      Body mass index is 31.58 kg/m². Physical Exam:     Physical Exam  Vitals and nursing note reviewed. Constitutional:       General: She is not in acute distress. Appearance: Normal appearance. She is well-developed. She is not diaphoretic. HENT:      Head: Normocephalic and atraumatic. Nose: Nose normal.      Comments: No residual ulceration left distal superior inner nares. Minimal erythema distal nose--non-tender. Eyes:      General: No scleral icterus. Right eye: No discharge. Left eye: No discharge. Conjunctiva/sclera: Conjunctivae normal.   Cardiovascular:      Rate and Rhythm: Normal rate and regular rhythm. Pulses: Normal pulses. Heart sounds: Normal heart sounds. No murmur heard. No friction rub. No gallop. Pulmonary:      Effort: Pulmonary effort is normal. No respiratory distress. Breath sounds: Normal breath sounds. No stridor. No wheezing, rhonchi or rales. Abdominal:      General: Bowel sounds are normal. There is no distension. Palpations: Abdomen is soft. Tenderness: There is no abdominal tenderness. There is no guarding.    Musculoskeletal:         General: No swelling, tenderness, deformity or signs of injury. Right lower leg: No edema. Left lower leg: No edema. Skin:     General: Skin is warm. Coloration: Skin is not jaundiced or pale. Findings: No bruising, erythema or rash. Neurological:      General: No focal deficit present. Mental Status: She is alert. Motor: No abnormal muscle tone. Coordination: Coordination normal.      Gait: Gait normal.   Psychiatric:         Mood and Affect: Mood normal.         Behavior: Behavior normal.         Thought Content: Thought content normal.         Judgment: Judgment normal.         An electronic signature was used to authenticate this note.   -- Patricia Villafana MD

## 2022-04-14 NOTE — PROGRESS NOTES
rm 17    Seen by [facility] Monday for nose pain given muprocin and keflex  Questions about BP monitor she just ordered  Can she do 90 day med refills  Cetrizine not covered by her insurance/wants replacement med  Wants to know about vitamin D supplement and level    Chief Complaint   Patient presents with    Hypertension     f/u    Diabetes     f/u     1. Have you been to the ER, urgent care clinic since your last visit? Hospitalized since your last visit? yes [facility]    2. Have you seen or consulted any other health care providers outside of the [address] [place] [address] since your last visit? Include any pap smears or colon screening.  Yes Where: see above     Visit Vitals  /79 (BP 1 Location: Left arm, BP Patient Position: Sitting, BP Cuff Size: Adult)   Pulse 76   Temp 97.9 °F (36.6 °C) (Oral)   Resp 16   Ht 5' 4\" (1.626 m)   Wt 184 lb (83.5 kg)   SpO2 100%   BMI 31.58 kg/m²

## 2022-04-26 ENCOUNTER — HOSPITAL ENCOUNTER (OUTPATIENT)
Dept: MAMMOGRAPHY | Age: 67
Discharge: HOME | End: 2022-04-26
Attending: OBSTETRICS & GYNECOLOGY
Payer: MEDICARE

## 2022-04-26 DIAGNOSIS — R92.8 ABNORMAL MAMMOGRAM: ICD-10-CM

## 2022-04-26 PROCEDURE — 77061 BREAST TOMOSYNTHESIS UNI: CPT

## 2022-04-26 PROCEDURE — 76642 ULTRASOUND BREAST LIMITED: CPT

## 2022-05-18 ENCOUNTER — HOSPITAL ENCOUNTER (OUTPATIENT)
Dept: MAMMOGRAPHY | Age: 67
Discharge: HOME | End: 2022-05-18
Attending: OBSTETRICS & GYNECOLOGY
Payer: MEDICARE

## 2022-05-18 DIAGNOSIS — R92.8 ABNORMAL MAMMOGRAM: ICD-10-CM

## 2022-05-18 PROCEDURE — 19081 BX BREAST 1ST LESION STRTCTC: CPT

## 2022-05-18 PROCEDURE — 77065 DX MAMMO INCL CAD UNI: CPT

## 2022-05-18 PROCEDURE — 74011000250 HC RX REV CODE- 250: Performed by: RADIOLOGY

## 2022-05-18 PROCEDURE — 88305 TISSUE EXAM BY PATHOLOGIST: CPT

## 2022-05-18 RX ORDER — LIDOCAINE HYDROCHLORIDE 10 MG/ML
5 INJECTION INFILTRATION; PERINEURAL
Status: COMPLETED | OUTPATIENT
Start: 2022-05-18 | End: 2022-05-18

## 2022-05-18 RX ORDER — LIDOCAINE HYDROCHLORIDE AND EPINEPHRINE 10; 10 MG/ML; UG/ML
20 INJECTION, SOLUTION INFILTRATION; PERINEURAL ONCE
Status: COMPLETED | OUTPATIENT
Start: 2022-05-18 | End: 2022-05-18

## 2022-05-18 RX ADMIN — LIDOCAINE HYDROCHLORIDE,EPINEPHRINE BITARTRATE 200 MG: 10; .01 INJECTION, SOLUTION INFILTRATION; PERINEURAL at 10:45

## 2022-05-18 RX ADMIN — LIDOCAINE HYDROCHLORIDE 5 ML: 10 INJECTION, SOLUTION INFILTRATION; PERINEURAL at 10:45

## 2022-05-18 NOTE — PROGRESS NOTES
Patient tolerated left breast biopsy well with scant bleeding. Biopsy site was bandaged in the usual fashion and discharge instructions were reviewed with the patient. She was provided with a written copy as well. Advised patient that results should be available by Friday and that she will receive a phone call in the afternoon. Encouraged her to call with any questions or concerns.

## 2022-05-20 NOTE — PROGRESS NOTES
Pathology approved by Dr. Amado Diss. Called patient and relayed benign results. Advised patient that she should continue her annual mammogram schedule. She reports that her biopsy site is healing well with no concerns. Encouraged her to call with any question or concerns.

## 2022-06-09 ENCOUNTER — ANESTHESIA (OUTPATIENT)
Dept: ENDOSCOPY | Age: 67
End: 2022-06-09
Payer: MEDICARE

## 2022-06-09 ENCOUNTER — ANESTHESIA EVENT (OUTPATIENT)
Dept: ENDOSCOPY | Age: 67
End: 2022-06-09
Payer: MEDICARE

## 2022-06-09 ENCOUNTER — HOSPITAL ENCOUNTER (OUTPATIENT)
Age: 67
Setting detail: OUTPATIENT SURGERY
Discharge: HOME | End: 2022-06-09
Attending: INTERNAL MEDICINE | Admitting: INTERNAL MEDICINE
Payer: MEDICARE

## 2022-06-09 VITALS
HEART RATE: 67 BPM | SYSTOLIC BLOOD PRESSURE: 123 MMHG | DIASTOLIC BLOOD PRESSURE: 60 MMHG | HEIGHT: 64 IN | OXYGEN SATURATION: 97 % | WEIGHT: 182.7 LBS | TEMPERATURE: 97.3 F | BODY MASS INDEX: 31.19 KG/M2 | RESPIRATION RATE: 16 BRPM

## 2022-06-09 LAB
GLUCOSE BLD STRIP.AUTO-MCNC: 173 MG/DL (ref 65–117)
SERVICE CMNT-IMP: ABNORMAL

## 2022-06-09 PROCEDURE — 2709999900 HC NON-CHARGEABLE SUPPLY: Performed by: INTERNAL MEDICINE

## 2022-06-09 PROCEDURE — 82962 GLUCOSE BLOOD TEST: CPT

## 2022-06-09 PROCEDURE — 74011250636 HC RX REV CODE- 250/636: Performed by: ANESTHESIOLOGY

## 2022-06-09 PROCEDURE — 74011000250 HC RX REV CODE- 250: Performed by: ANESTHESIOLOGY

## 2022-06-09 PROCEDURE — 74011250636 HC RX REV CODE- 250/636: Performed by: INTERNAL MEDICINE

## 2022-06-09 PROCEDURE — 88305 TISSUE EXAM BY PATHOLOGIST: CPT

## 2022-06-09 PROCEDURE — 77030013992 HC SNR POLYP ENDOSC BSC -B: Performed by: INTERNAL MEDICINE

## 2022-06-09 PROCEDURE — 76060000031 HC ANESTHESIA FIRST 0.5 HR: Performed by: INTERNAL MEDICINE

## 2022-06-09 PROCEDURE — 76040000019: Performed by: INTERNAL MEDICINE

## 2022-06-09 RX ORDER — FENTANYL CITRATE 50 UG/ML
25 INJECTION, SOLUTION INTRAMUSCULAR; INTRAVENOUS
Status: DISCONTINUED | OUTPATIENT
Start: 2022-06-09 | End: 2022-06-09 | Stop reason: HOSPADM

## 2022-06-09 RX ORDER — PROPOFOL 10 MG/ML
INJECTION, EMULSION INTRAVENOUS AS NEEDED
Status: DISCONTINUED | OUTPATIENT
Start: 2022-06-09 | End: 2022-06-09 | Stop reason: HOSPADM

## 2022-06-09 RX ORDER — SODIUM CHLORIDE 0.9 % (FLUSH) 0.9 %
5-40 SYRINGE (ML) INJECTION AS NEEDED
Status: DISCONTINUED | OUTPATIENT
Start: 2022-06-09 | End: 2022-06-09 | Stop reason: HOSPADM

## 2022-06-09 RX ORDER — SODIUM CHLORIDE 0.9 % (FLUSH) 0.9 %
5-40 SYRINGE (ML) INJECTION EVERY 8 HOURS
Status: DISCONTINUED | OUTPATIENT
Start: 2022-06-09 | End: 2022-06-09 | Stop reason: HOSPADM

## 2022-06-09 RX ORDER — LIDOCAINE HYDROCHLORIDE 20 MG/ML
INJECTION, SOLUTION EPIDURAL; INFILTRATION; INTRACAUDAL; PERINEURAL AS NEEDED
Status: DISCONTINUED | OUTPATIENT
Start: 2022-06-09 | End: 2022-06-09 | Stop reason: HOSPADM

## 2022-06-09 RX ORDER — MIDAZOLAM HYDROCHLORIDE 1 MG/ML
.25-5 INJECTION, SOLUTION INTRAMUSCULAR; INTRAVENOUS
Status: DISCONTINUED | OUTPATIENT
Start: 2022-06-09 | End: 2022-06-09 | Stop reason: HOSPADM

## 2022-06-09 RX ORDER — FLUMAZENIL 0.1 MG/ML
0.2 INJECTION INTRAVENOUS
Status: DISCONTINUED | OUTPATIENT
Start: 2022-06-09 | End: 2022-06-09 | Stop reason: HOSPADM

## 2022-06-09 RX ORDER — DEXTROMETHORPHAN/PSEUDOEPHED 2.5-7.5/.8
1.2 DROPS ORAL
Status: DISCONTINUED | OUTPATIENT
Start: 2022-06-09 | End: 2022-06-09 | Stop reason: HOSPADM

## 2022-06-09 RX ORDER — ATROPINE SULFATE 0.1 MG/ML
0.5 INJECTION INTRAVENOUS
Status: DISCONTINUED | OUTPATIENT
Start: 2022-06-09 | End: 2022-06-09 | Stop reason: HOSPADM

## 2022-06-09 RX ORDER — NALOXONE HYDROCHLORIDE 0.4 MG/ML
0.4 INJECTION, SOLUTION INTRAMUSCULAR; INTRAVENOUS; SUBCUTANEOUS
Status: DISCONTINUED | OUTPATIENT
Start: 2022-06-09 | End: 2022-06-09 | Stop reason: HOSPADM

## 2022-06-09 RX ORDER — SODIUM CHLORIDE 9 MG/ML
75 INJECTION, SOLUTION INTRAVENOUS CONTINUOUS
Status: DISCONTINUED | OUTPATIENT
Start: 2022-06-09 | End: 2022-06-09 | Stop reason: HOSPADM

## 2022-06-09 RX ORDER — EPINEPHRINE 0.1 MG/ML
1 INJECTION INTRACARDIAC; INTRAVENOUS
Status: DISCONTINUED | OUTPATIENT
Start: 2022-06-09 | End: 2022-06-09 | Stop reason: HOSPADM

## 2022-06-09 RX ADMIN — LIDOCAINE HYDROCHLORIDE 40 MG: 20 INJECTION, SOLUTION EPIDURAL; INFILTRATION; INTRACAUDAL; PERINEURAL at 09:43

## 2022-06-09 RX ADMIN — PROPOFOL 140 MG: 10 INJECTION, EMULSION INTRAVENOUS at 09:57

## 2022-06-09 RX ADMIN — SODIUM CHLORIDE 75 ML/HR: 0.9 INJECTION, SOLUTION INTRAVENOUS at 08:58

## 2022-06-09 NOTE — ANESTHESIA PREPROCEDURE EVALUATION
Relevant Problems   CARDIOVASCULAR   (+) Essential hypertension      ENDOCRINE   (+) Type 2 diabetes mellitus       Anesthetic History   No history of anesthetic complications            Review of Systems / Medical History  Patient summary reviewed, nursing notes reviewed and pertinent labs reviewed    Pulmonary  Within defined limits                 Neuro/Psych   Within defined limits           Cardiovascular    Hypertension              Exercise tolerance: >4 METS     GI/Hepatic/Renal           Liver disease     Endo/Other    Diabetes: type 2         Other Findings              Physical Exam    Airway  Mallampati: II  TM Distance: 4 - 6 cm  Neck ROM: normal range of motion   Mouth opening: Normal     Cardiovascular  Regular rate and rhythm,  S1 and S2 normal,  no murmur, click, rub, or gallop  Rhythm: regular  Rate: normal         Dental  No notable dental hx       Pulmonary  Breath sounds clear to auscultation               Abdominal  GI exam deferred       Other Findings            Anesthetic Plan    ASA: 2  Anesthesia type: MAC          Induction: Intravenous  Anesthetic plan and risks discussed with: Patient

## 2022-06-09 NOTE — PROCEDURES
NAME:  Andreas Macario   :   1955   MRN:   [id number]     Date/Time:  2022 10:00 AM    Colonoscopy Operative Report    Procedure Type:   Colonoscopy with polypectomy (cold snare)     Indications:     Screening colonoscopy  Pre-operative Diagnosis: see indication above  Post-operative Diagnosis:  See findings below  :  Kelley Chicas MD  Referring Provider: Rosalia Trujillo MD    Exam:  Airway: clear, no airway problems anticipated  Heart: RRR, without gallops or rubs  Lungs: clear bilaterally without wheezes, crackles, or rhonchi  Abdomen: soft, nontender, nondistended, bowel sounds present  Mental Status: awake, alert and oriented to person, place and time    Sedation:  MAC anesthesia Propofol 140mg IV  Procedure Details:  After informed consent was obtained with all risks and benefits of procedure explained and preoperative exam completed, the patient was taken to the endoscopy suite and placed in the left lateral decubitus position. Upon sequential sedation as per above, a digital rectal exam was performed demonstrating no hemorrhoids. The Olympus videocolonoscope  was inserted in the rectum and carefully advanced to the cecum, which was identified by the ileocecal valve and appendiceal orifice. The quality of preparation was adequate. The colonoscope was slowly withdrawn with careful evaluation between folds. Retroflexion in the rectum was completed demonstrating no hemorrhoids. Findings:     -Single diminutive benign-appearing 2mm sessile transverse colon polyp at 78cm; removed with cold snare     Specimen Removed:  #1 transverse colon polyp  Complications: None. EBL:  None. Impression:    -Single diminutive benign-appearing 2mm sessile transverse colon polyp at 78cm; removed with cold snare     Recommendations: --Await pathology. , -If adenoma is present, repeat colonoscopy in 5 years; however, if polyp is only hyperplastic, repeat colonoscopy in 10yrs as continued colon cancer screening. -High fiber diet. Resume normal medication(s). You will receive a letter about the biopsy results in about 10 days. You may be asked to call your doctor's office for the results. Discharge Disposition:  Home in the company of a  when able to ambulate.     Isabel Jasso MD

## 2022-06-09 NOTE — DISCHARGE INSTRUCTIONS
Yisel Lay  [id number]  1955    COLON DISCHARGE INSTRUCTIONS  Discomfort:  Redness at IV site- apply warm compress to area; if redness or soreness persist- contact your physician  There may be a slight amount of blood passed from the rectum  Gaseous discomfort- walking, belching will help relieve any discomfort  You may not operate a vehicle for 12 hours  You may not engage in an occupation involving machinery or appliances for rest of today  You may not drink alcoholic beverages for at least 12 hours  Avoid making any critical decisions for at least 24 hour  DIET:   High fiber diet. - however -  remember your colon is empty and a heavy meal will produce gas. Avoid these foods:  vegetables, fried / greasy foods, carbonated drinks for today  MEDICATION:         ACTIVITY:  You may not resume your normal daily activities until tomorrow AM; it is recommended that you spend the remainder of the day resting -  avoid any strenuous activity. CALL M.D. ANY SIGN OF:   Increasing pain, nausea, vomiting  Abdominal distension (swelling)  New increased bleeding (oral or rectal)  Fever (chills)  Pain in chest area  Bloody discharge from nose or mouth  Shortness of breath    IMPRESSION:  -Single diminutive benign-appearing 2mm sessile transverse colon polyp at 78cm; removed with cold snare     Follow-up Instructions:  -Call Dr. Andreas Mcknight for the results of procedure / biopsy in 7-10 days  -Telephone # [phone]  -Await pathology. ,   -If adenoma is present, repeat colonoscopy in 5 years; however, if polyp is only hyperplastic, repeat colonoscopy in 10yrs as continued colon cancer screening.     Torsten Painting MD

## 2022-06-09 NOTE — H&P
Gastroenterology Outpatient History and Physical    Patient: Leyla Foster    Physician: Andre Boogie MD    Chief Complaint: CRC screening. History of Present Illness: 74yo F with need for CRC screening. Last colonoscopy 12/2009. NO GI s/sx. No fam hx CRC or polyps    History:  Past Medical History:   Diagnosis Date    Diabetes ([address]     type 2    H/O seasonal allergies     Hepatic steatosis 2013    CT imaging.  Hypertension     Menopause     Pap smear for cervical cancer screening 07/2016    Normal with negative HPV--repeat 2yr with gyn. Past Surgical History:   Procedure Laterality Date    HX BREAST BIOPSY Left 04/2022      Social History     Socioeconomic History    Marital status: LEGALLY    Tobacco Use    Smoking status: Never Smoker    Smokeless tobacco: Never Used   Substance and Sexual Activity    Alcohol use: Not Currently     Alcohol/week: 0.0 standard drinks     Comment: occasionally    Drug use: No    Sexual activity: Not Currently      Family History   Problem Relation Age of Onset    Heart Disease Father     Hypertension Father     Glaucoma Father     Other Mother         ulcers      Patient Active Problem List   Diagnosis Code    Essential hypertension I10    Type 2 diabetes mellitus E11.9       Allergies: Allergies   Allergen Reactions    Naprosyn [Naproxen] Other (comments)     Hallucinations. Tolerates Ibuprofen without problems. Medications:   Prior to Admission medications    Medication Sig Start Date End Date Taking? Authorizing Provider   lisinopriL (PRINIVIL, ZESTRIL) 40 mg tablet TAKE ONE-HALF TABLET BY MOUTH TWICE A DAY 3/31/22  Yes Stephany Stein MD   amLODIPine (NORVASC) 10 mg tablet TAKE ONE TABLET BY MOUTH ONE TIME DAILY 3/31/22  Yes Stephany Stein MD   cetirizine (ZYRTEC) 10 mg tablet Take 1 Tablet by mouth nightly.  4/7/22   Stephany Stein MD   metFORMIN ER (GLUCOPHAGE XR) 500 mg tablet TAKE ONE TABLET BY MOUTH ONE TIME DAILY WITH DINNER 3/31/22   Derrell Rubio MD   chlorthalidone (HYGROTON) 25 mg tablet TAKE ONE TABLET BY MOUTH ONE TIME DAILY 3/17/22   Derrell Rubio MD   fluticasone propionate [facility]) 50 mcg/actuation nasal spray USE TWO SPRAYS IN [facility] NOSTRIL ONE TIME DAILY 2/6/22   Derrell Rubio MD   OTHER Dispense blood pressure monitoring kit to pt. Please ensure non-wrist cuff device, with appropriately sized cuff to allow for accurate home BP monitoring. Patient not taking: Reported on 4/14/2022 1/14/22   Derrell Rubio MD   Contour Next EZ Meter misc  1/8/21   Provider, Historical   glucose blood VI test strips (ASCENSIA AUTODISC VI, ONE TOUCH ULTRA TEST VI) strip Test blood sugar 1-2 times daily. Dx:  DM type 2 with hyperglycemia. Dispense insurance-covered meter/strips. Patient not taking: Reported on 4/14/2022 1/8/21   Derrell Rubio MD   lancets misc Test blood sugar 1-2 times daily. Dx:  DM type 2 with hyperglycemia. Dispense insurance-covered meter/strips. Patient not taking: Reported on 4/14/2022 1/8/21   Derrell Rubio MD   Blood-Glucose Meter monitoring kit Test blood sugar 1-2 times daily. Dx:  DM type 2 with hyperglycemia. Dispense insurance-covered meter/strips. Patient not taking: Reported on 4/14/2022 1/8/21   Derrell Rubio MD   melatonin 5 mg cap capsule Take 1 Cap by mouth nightly. May increase to 10mg (2 capsules) in 5-7 days as needed for sleep. 7/7/20   Derrell Rubio MD   multivitamin (ONE A DAY) tablet Take 1 Tab by mouth daily. Provider, Historical   OTHER Dispense blood pressure monitoring kit to pt. Please ensure non-wrist cuff device, with appropriately sized cuff to allow for accurate home BP monitoring. Patient not taking: Reported on 4/14/2022 6/18/20   Derrell Rubio MD     Physical Exam:   Vital Signs: Blood pressure (!) 121/59, pulse 81, temperature 98.3 °F (36.8 °C), resp.  rate 17, height 5' 4\" (1.626 m), weight 82.9 kg (182 lb 11.2 oz), SpO2 98 %, not currently breastfeeding.   General: well developed, well nourished   HEENT: unremarkable   Heart: regular rhythm no mumur    Lungs: clear   Abdominal:  benign   Neurological: unremarkable   Extremities: no edema     Findings/Diagnosis: CRC screening  Plan of Care/Planned Procedure: Colonoscopy with conscious/deep sedation    Signed:  Stephen Leventhal, MD 6/9/2022

## 2022-06-09 NOTE — ANESTHESIA POSTPROCEDURE EVALUATION
Procedure(s):  COLONOSCOPY  ENDOSCOPIC POLYPECTOMY. total IV anesthesia    Anesthesia Post Evaluation        Patient location during evaluation: PACU  Note status: Adequate. Level of consciousness: responsive to verbal stimuli and sleepy but conscious  Pain management: satisfactory to patient  Airway patency: patent  Anesthetic complications: no  Cardiovascular status: acceptable  Respiratory status: acceptable  Hydration status: acceptable  Comments: +Post-Anesthesia Evaluation and Assessment    Patient: Keith Anderson MRN: [id number]  SSN: [id number]   YOB: 1955  Age: 74 y.o. Sex: female      Cardiovascular Function/Vital Signs    /60   Pulse 67   Temp 36.3 °C (97.3 °F)   Resp 16   Ht 5' 4\" (1.626 m)   Wt 82.9 kg (182 lb 11.2 oz)   SpO2 97%   Breastfeeding No   BMI 31.36 kg/m²     Patient is status post Procedure(s):  COLONOSCOPY  ENDOSCOPIC POLYPECTOMY. Nausea/Vomiting: Controlled. Postoperative hydration reviewed and adequate. Pain:  Pain Scale 1: Numeric (0 - 10) (06/09/22 1018)  Pain Intensity 1: 0 (06/09/22 1018)   Managed. Neurological Status: At baseline. Mental Status and Level of Consciousness: Arousable. Pulmonary Status:   O2 Device: None (Room air) (06/09/22 1018)   Adequate oxygenation and airway patent. Complications related to anesthesia: None    Post-anesthesia assessment completed. No concerns. Signed By: Angelica Gary DO    6/9/2022  Post anesthesia nausea and vomiting:  controlled      INITIAL Post-op Vital signs:   Vitals Value Taken Time   /60 06/09/22 1021   Temp     Pulse 66 06/09/22 1021   Resp 16 06/09/22 1021   SpO2 100 % 06/09/22 1021   Vitals shown include unvalidated device data.

## 2022-06-09 NOTE — PROGRESS NOTES
Endoscope was pre-cleaned at the bedside immediately following procedure by [facility] GRETEL. For medications administered by anesthesia, see anesthesia chart.

## 2022-06-09 NOTE — ROUTINE PROCESS
Ciro Palafox  1955  [id number]    Situation:  Verbal report received from: Jesus Alberto Torres  Procedure: Procedure(s):  COLONOSCOPY  ENDOSCOPIC POLYPECTOMY    Background:    Preoperative diagnosis: SCREENING  Postoperative diagnosis: Polyp    :  Dr. Fabi Dick  Assistant(s): Endoscopy Technician-1: Marivel Hendricks  Endoscopy RN-1: Ciro Ramirez    Specimens:   ID Type Source Tests Collected by Time Destination   1 : Transverse Polyp Preservative Colon, Transverse  Tory Chase MD 6/9/2022 0458 Pathology     H. Pylori  no    Assessment:  Intra-procedure medications       Anesthesia gave intra-procedure sedation and medications, see anesthesia flow sheet yes    Intravenous fluids: NS@ KVO     Vital signs stable yes    Abdominal assessment: round and soft yes    Recommendation:

## 2022-07-14 ENCOUNTER — OFFICE VISIT (OUTPATIENT)
Dept: INTERNAL MEDICINE CLINIC | Age: 67
End: 2022-07-14
Payer: MEDICARE

## 2022-07-14 VITALS
HEIGHT: 64 IN | SYSTOLIC BLOOD PRESSURE: 118 MMHG | RESPIRATION RATE: 16 BRPM | WEIGHT: 184 LBS | DIASTOLIC BLOOD PRESSURE: 75 MMHG | TEMPERATURE: 98.4 F | OXYGEN SATURATION: 98 % | HEART RATE: 78 BPM | BODY MASS INDEX: 31.41 KG/M2

## 2022-07-14 DIAGNOSIS — E11.65 CONTROLLED TYPE 2 DIABETES MELLITUS WITH HYPERGLYCEMIA, WITHOUT LONG-TERM CURRENT USE OF INSULIN (HCC): ICD-10-CM

## 2022-07-14 DIAGNOSIS — E78.00 HYPERCHOLESTEREMIA: ICD-10-CM

## 2022-07-14 DIAGNOSIS — Z00.00 WELCOME TO MEDICARE PREVENTIVE VISIT: Primary | ICD-10-CM

## 2022-07-14 DIAGNOSIS — Z13.820 SCREENING FOR OSTEOPOROSIS: ICD-10-CM

## 2022-07-14 DIAGNOSIS — Z71.89 ACP (ADVANCE CARE PLANNING): ICD-10-CM

## 2022-07-14 DIAGNOSIS — I10 ESSENTIAL HYPERTENSION WITH GOAL BLOOD PRESSURE LESS THAN 130/80: ICD-10-CM

## 2022-07-14 DIAGNOSIS — E55.9 HYPOVITAMINOSIS D: ICD-10-CM

## 2022-07-14 DIAGNOSIS — Z13.31 SCREENING FOR DEPRESSION: ICD-10-CM

## 2022-07-14 PROCEDURE — G0403 EKG FOR INITIAL PREVENT EXAM: HCPCS | Performed by: INTERNAL MEDICINE

## 2022-07-14 PROCEDURE — 3051F HG A1C>EQUAL 7.0%<8.0%: CPT | Performed by: INTERNAL MEDICINE

## 2022-07-14 PROCEDURE — 99214 OFFICE O/P EST MOD 30 MIN: CPT | Performed by: INTERNAL MEDICINE

## 2022-07-14 PROCEDURE — G0402 INITIAL PREVENTIVE EXAM: HCPCS | Performed by: INTERNAL MEDICINE

## 2022-07-14 PROCEDURE — 1123F ACP DISCUSS/DSCN MKR DOCD: CPT | Performed by: INTERNAL MEDICINE

## 2022-07-14 RX ORDER — SOD SULF/POT CHLORIDE/MAG SULF 1.479 G
TABLET ORAL
COMMUNITY
Start: 2022-05-24

## 2022-07-14 NOTE — PROGRESS NOTES
This is a \"Welcome to [employer]"  Initial Preventive Physical Examination (IPPE) providing Personalized Prevention Plan Services (Performed in the first 12 months of enrollment)    I have reviewed the patient's medical history in detail and updated the computerized patient record. Assessment/Plan   Education and counseling provided:  Are appropriate based on today's review and evaluation  End-of-Life planning (with patient's consent)  Pneumococcal Vaccine  Screening Mammography  Colorectal cancer screening tests  Bone mass measurement (DEXA)  Diabetes outpatient self-management training services        ICD-10-CM ICD-9-CM    1. Welcome to Medicare preventive visit  Z00.00 V70.0 AMB POC EKG ROUTINE W/ 12 LEADS, SCREEN ()      CBC WITH AUTOMATED DIFF      METABOLIC PANEL, COMPREHENSIVE      LIPID PANEL      HEMOGLOBIN A1C WITH EAG      REFERRAL TO NUTRITION      DEXA BONE DENSITY STUDY AXIAL   2. Screening for depression  Z13.31 V79.0    3. Screening for osteoporosis  Z13.820 V82.81 DEXA BONE DENSITY STUDY AXIAL   4. ACP (advance care planning)  Z71.89 V65.49    5. Controlled type 2 diabetes mellitus with hyperglycemia, without long-term current use of insulin (HCC)  D03.20 869.56 METABOLIC PANEL, COMPREHENSIVE     790.29 HEMOGLOBIN A1C WITH EAG      REFERRAL TO NUTRITION      MICROALBUMIN, UR, RAND W/ MICROALB/CREAT RATIO   6. Essential hypertension with goal blood pressure less than 130/80  I10 401.9 AMB POC EKG ROUTINE W/ 12 LEADS, SCREEN ()      METABOLIC PANEL, COMPREHENSIVE      LIPID PANEL      REFERRAL TO NUTRITION   7. Hypercholesteremia  E78.00 272.0 AMB POC EKG ROUTINE W/ 12 LEADS, SCREEN ()      METABOLIC PANEL, COMPREHENSIVE      LIPID PANEL      CK      REFERRAL TO NUTRITION   8. Hypovitaminosis D  E55.9 268.9 VITAMIN D, 25 HYDROXY       Diagnoses #1-4 for Medicare Wellness/Preventive visit.     Due to significant separate problems unrelated to [facility] Wellness Visit, also addressed following diagnoses/problems at visit as below (diagnoses #5-8 above). 1-4:  Screenings reviewed at visit. 5-8:  Lab monitoring reviewed. Continue current medications pending lab results/review. Follow-up and Dispositions    Return in about 3 months (around 10/14/2022) for medication follow-up.       lab results and schedule of future lab studies reviewed with patient  reviewed medications and side effects in detail  radiology results and schedule of future radiology studies reviewed with patient    For additional documentation of information and/or recommendations discussed this visit, please see notes in instructions. Plan and evaluation (above) reviewed with pt at visit  Patient voiced understanding of plan and provided with time to ask/review questions. After Visit Summary (AVS) provided to pt after visit with additional instructions as needed/reviewed. HPI:    Notes (nursing/rooming note): Had mammo  Had colo  Is fasting  Clinic /72  Home /76      Depression Risk Screen     3 most recent PHQ Screens 7/14/2022   Little interest or pleasure in doing things Not at all   Feeling down, depressed, irritable, or hopeless Not at all   Total Score PHQ 2 0       Alcohol & Drug Abuse Risk Screen    Do you average more than 1 drink per night or more than 7 drinks a week:  No  On any one occasion in the past three months have you have had more than 3 drinks containing alcohol:  No          Functional Ability and Level of Safety    Diet: The patient is prescribed and follows a special diet. Trying to follow DM and low cholesterol diet, but interested in nutrition referral for assistance. Hearing: Hearing is good. Vision Screening:  Vision is good. Seen recently and new glasses, and no DM eye problems. Activities of Daily Living: The home contains: no safety equipment.   Patient does total self care     ADL Assessment 7/14/2022   Feeding yourself No Help Needed Getting from bed to chair No Help Needed   Getting dressed No Help Needed   Bathing or showering No Help Needed   Walk across the room (includes cane/walker) No Help Needed   Using the telphone No Help Needed   Taking your medications No Help Needed   Preparing meals No Help Needed   Managing money (expenses/bills) No Help Needed   Moderately strenuous housework (laundry) No Help Needed   Shopping for personal items (toiletries/medicines) No Help Needed   Shopping for groceries No Help Needed   Driving No Help Needed   Climbing a flight of stairs No Help Needed   Getting to places beyond walking distances No Help Needed        Ambulation: with no difficulty      Exercise level: moderately active     Fall Risk Screen:  Fall Risk Assessment, last 12 mths 7/14/2022   Able to walk? Yes   Fall in past 12 months? 0   Do you feel unsteady? 0   Are you worried about falling 0      Abuse Screen:  Patient is not abused       Screening EKG   EKG order placed: Yes    End of Life Planning   Advanced care planning directives were discussed with the patient and /or family/caregiver. She does not have an ACPlan at this time. Prefers to discuss with her daughter and schedule with ACPlanning consult in future. Health Maintenance Due     Health Maintenance Due   Topic Date Due    Eye Exam Retinal or Dilated  Never done    MICROALBUMIN Q1  06/18/2021    COVID-19 Vaccine (3 - Booster for Moderna series) 10/04/2021    Pneumococcal 65+ years (2 - PCV) 02/23/2022    Bone Densitometry (Dexa) Screening  Never done     --Release for eye exam completed at visit. --Recommended COVID booster. --Reviewed PCV-20 in pharmacy--unable to give in office at this time. --Bone density ordered and imaging reviewed. --Reviewed prior breast imaging/mammogram and biopsy--normal.    --Had 6-9-22 colonoscopy with tubular adenoma/polyp and repeat recommended 5yrs. Pt had received letter already from GI.       Patient Care Team   Patient Care Team:  Esther Dela Cruz MD as PCP - General (Infectious Disease Physician)  Esther Dela Cruz MD as PCP - [facility]  Semaj Macario NP as Nurse Practitioner (Obstetrics & Gynecology)    History     Past Medical History:   Diagnosis Date    Diabetes [facility])     type 2    H/O seasonal allergies     Hepatic steatosis 2013    CT imaging. Hypertension     Menopause     Pap smear for cervical cancer screening 07/2016    Normal with negative HPV--repeat 2yr with gyn. Past Surgical History:   Procedure Laterality Date    COLONOSCOPY N/A 6/9/2022    COLONOSCOPY performed by Grant Valencia MD at [facility] ENDOSCOPY    COLONOSCOPY,REMV MYRTLEN,SNARE  6/9/2022         HX BREAST BIOPSY Left 04/2022     Current Outpatient Medications   Medication Sig Dispense Refill    cetirizine (ZYRTEC) 10 mg tablet Take 1 Tablet by mouth nightly. 90 Tablet 3    lisinopriL (PRINIVIL, ZESTRIL) 40 mg tablet TAKE ONE-HALF TABLET BY MOUTH TWICE A DAY 30 Tablet 5    amLODIPine (NORVASC) 10 mg tablet TAKE ONE TABLET BY MOUTH ONE TIME DAILY 30 Tablet 5    metFORMIN ER (GLUCOPHAGE XR) 500 mg tablet TAKE ONE TABLET BY MOUTH ONE TIME DAILY WITH DINNER 30 Tablet 5    chlorthalidone (HYGROTON) 25 mg tablet TAKE ONE TABLET BY MOUTH ONE TIME DAILY 90 Tablet 1    fluticasone propionate (FLONASE) 50 mcg/actuation nasal spray USE TWO SPRAYS IN EACH NOSTRIL ONE TIME DAILY 16 g 5    melatonin 5 mg cap capsule Take 1 Cap by mouth nightly. May increase to 10mg (2 capsules) in 5-7 days as needed for sleep. 50 Cap 1    multivitamin (ONE A DAY) tablet Take 1 Tab by mouth daily. Sutab 1.479-0.188- 0.225 gram tab  (Patient not taking: Reported on 7/14/2022)      OTHER Dispense blood pressure monitoring kit to pt. Please ensure non-wrist cuff device, with appropriately sized cuff to allow for accurate home BP monitoring.  (Patient not taking: Reported on 4/14/2022) 1 Each 0    Contour Next EZ Meter misc  (Patient not taking: Reported on 4/14/2022)      glucose blood VI test strips (ASCENSIA AUTODISC VI, ONE TOUCH ULTRA TEST VI) strip Test blood sugar 1-2 times daily. Dx:  DM type 2 with hyperglycemia. Dispense insurance-covered meter/strips. (Patient not taking: Reported on 4/14/2022) 100 Strip 5    lancets misc Test blood sugar 1-2 times daily. Dx:  DM type 2 with hyperglycemia. Dispense insurance-covered meter/strips. (Patient not taking: Reported on 4/14/2022) 100 Each 5    Blood-Glucose Meter monitoring kit Test blood sugar 1-2 times daily. Dx:  DM type 2 with hyperglycemia. Dispense insurance-covered meter/strips. (Patient not taking: Reported on 4/14/2022) 1 Kit 0    OTHER Dispense blood pressure monitoring kit to pt. Please ensure non-wrist cuff device, with appropriately sized cuff to allow for accurate home BP monitoring. (Patient not taking: Reported on 4/14/2022) 1 Each 0     Allergies   Allergen Reactions    Naprosyn [Naproxen] Other (comments)     Hallucinations. Tolerates Ibuprofen without problems. Family History   Problem Relation Age of Onset    Heart Disease Father     Hypertension Father     Glaucoma Father     Other Mother         ulcers     Social History     Tobacco Use    Smoking status: Never Smoker    Smokeless tobacco: Never Used   Substance Use Topics    Alcohol use: Not Currently     Alcohol/week: 0.0 standard drinks     Comment: occasionally       Physical Exam[de-identified]     Blood pressure 118/75, pulse 78, temperature 98.4 °F (36.9 °C), temperature source Oral, resp. rate 16, height 5' 4\" (1.626 m), weight 184 lb (83.5 kg), SpO2 98 %.     Vitals:    07/14/22 1051   BP: 118/75   BP 1 Location: Left arm   BP Patient Position: Sitting   BP Cuff Size: Adult   Pulse: 78   Temp: 98.4 °F (36.9 °C)   TempSrc: Oral   Resp: 16   Height: 5' 4\" (1.626 m)   Weight: 184 lb (83.5 kg)   SpO2: 98%     BP with pt home cuff (with 2nd BP above):    Clinic /72  Home /76    General appearance: alert, cooperative, no distress, appears stated age  Head: Normocephalic, without obvious abnormality, atraumatic  Eyes: conjunctivae/corneas clear. Ears: normal TM's and external ear canals AU. No wax bilat. Neck: supple, symmetrical, trachea midline, no adenopathy, thyroid: not enlarged, symmetric, no tenderness/mass/nodules  Back: symmetric, no curvature. ROM normal  Lungs: clear to auscultation bilaterally  Heart: regular rate and rhythm, S1, S2 normal, no murmur, click, rub or gallop  Abdomen: soft, non-tender. Bowel sounds normal. No masses,  no organomegaly  Extremities: extremities normal, atraumatic, no cyanosis or edema  Pulses: 2+ and symmetric radial.  Skin: Skin color, texture, turgor normal. No rashes or lesions  Lymph nodes: Cervical nodes normal.  Neurologic: Grossly normal    EKG:  Normal EKG. NSR at 75bpm.  Normal intervals. Non-specific T flattening. An electronic signature was used to authenticate this note.   -- Alvaro Pacheco MD

## 2022-07-14 NOTE — PATIENT INSTRUCTIONS
Medicare Wellness Visit, Female     The best way to live healthy is to have a lifestyle where you eat a well-balanced diet, exercise regularly, limit alcohol use, and quit all forms of tobacco/nicotine, if applicable. Regular preventive services are another way to keep healthy. Preventive services (vaccines, screening tests, monitoring & exams) can help personalize your care plan, which helps you manage your own care. Screening tests can find health problems at the earliest stages, when they are easiest to treat. [facility] follows the current, evidence-based guidelines published by the [facility] ([facility]) when recommending preventive services for our patients. Because we follow these guidelines, sometimes recommendations change over time as research supports it. (For example, mammograms used to be recommended annually. Even though Medicare will still pay for an annual mammogram, the newer guidelines recommend a mammogram every two years for women of average risk). Of course, you and your doctor may decide to screen more often for some diseases, based on your risk and your co-morbidities (chronic disease you are already diagnosed with). Preventive services for you include:  - Medicare offers their members a free annual wellness visit, which is time for you and your primary care provider to discuss and plan for your preventive service needs. Take advantage of this benefit every year!  -All adults over the age of 72 should receive the recommended pneumonia vaccines. Current USPSTF guidelines recommend a series of two vaccines for the best pneumonia protection.   -All adults should have a flu vaccine yearly and a tetanus vaccine every 10 years.   -All adults age 48 and older should receive the shingles vaccines (series of two vaccines).       -All adults age 38-68 who are overweight should have a diabetes screening test once every three years.   -All adults born between [birth year] and 1963 should be screened once for Hepatitis C.  -Other screening tests and preventive services for persons with diabetes include: an eye exam to screen for diabetic retinopathy, a kidney function test, a foot exam, and stricter control over your cholesterol.   -Cardiovascular screening for adults with routine risk involves an electrocardiogram (ECG) at intervals determined by your doctor.   -Colorectal cancer screenings should be done for adults age 54-65 with no increased risk factors for colorectal cancer. There are a number of acceptable methods of screening for this type of cancer. Each test has its own benefits and drawbacks. Discuss with your doctor what is most appropriate for you during your annual wellness visit. The different tests include: colonoscopy (considered the best screening method), a fecal occult blood test, a fecal DNA test, and sigmoidoscopy.    -A bone mass density test is recommended when a woman turns 65 to screen for osteoporosis. This test is only recommended one time, as a screening. Some providers will use this same test as a disease monitoring tool if you already have osteoporosis. -Breast cancer screenings are recommended every other year for women of normal risk, age 54-69.  -Cervical cancer screenings for women over age 72 are only recommended with certain risk factors. Here is a list of your current Health Maintenance items (your personalized list of preventive services) with a due date:  Health Maintenance Due   Topic Date Due    Eye Exam  Never done    Albumin Urine Test  06/18/2021    COVID-19 Vaccine (3 - Booster for Moderna series) 10/04/2021    Pneumococcal Vaccine (2 - PCV) 02/23/2022    Bone Mineral Density   Never done       --Please ask/get the pneumonoccal vaccine as PCV-20 (Pneumococcal Conjugate Vaccine-20) as reviewed.

## 2022-07-15 LAB
25(OH)D3 SERPL-MCNC: 33.5 NG/ML (ref 30–100)
ALBUMIN SERPL-MCNC: 4.4 G/DL (ref 3.5–5)
ALBUMIN/GLOB SERPL: 1.3 {RATIO} (ref 1.1–2.2)
ALP SERPL-CCNC: 74 U/L (ref 45–117)
ALT SERPL-CCNC: 27 U/L (ref 12–78)
ANION GAP SERPL CALC-SCNC: 4 MMOL/L (ref 5–15)
AST SERPL-CCNC: 20 U/L (ref 15–37)
BASOPHILS # BLD: 0 K/UL (ref 0–0.1)
BASOPHILS NFR BLD: 1 % (ref 0–1)
BILIRUB SERPL-MCNC: 0.5 MG/DL (ref 0.2–1)
BUN SERPL-MCNC: 21 MG/DL (ref 6–20)
BUN/CREAT SERPL: 28 (ref 12–20)
CALCIUM SERPL-MCNC: 10.3 MG/DL (ref 8.5–10.1)
CHLORIDE SERPL-SCNC: 104 MMOL/L (ref 97–108)
CHOLEST SERPL-MCNC: 233 MG/DL
CK SERPL-CCNC: 324 U/L (ref 26–192)
CO2 SERPL-SCNC: 33 MMOL/L (ref 21–32)
CREAT SERPL-MCNC: 0.76 MG/DL (ref 0.55–1.02)
CREAT UR-MCNC: 141 MG/DL
DIFFERENTIAL METHOD BLD: ABNORMAL
EOSINOPHIL # BLD: 0.1 K/UL (ref 0–0.4)
EOSINOPHIL NFR BLD: 2 % (ref 0–7)
ERYTHROCYTE [DISTWIDTH] IN BLOOD BY AUTOMATED COUNT: 12.9 % (ref 11.5–14.5)
EST. AVERAGE GLUCOSE BLD GHB EST-MCNC: 171 MG/DL
GLOBULIN SER CALC-MCNC: 3.5 G/DL (ref 2–4)
GLUCOSE SERPL-MCNC: 142 MG/DL (ref 65–100)
HBA1C MFR BLD: 7.6 % (ref 4–5.6)
HCT VFR BLD AUTO: 38.1 % (ref 35–47)
HDLC SERPL-MCNC: 56 MG/DL
HDLC SERPL: 4.2 {RATIO} (ref 0–5)
HGB BLD-MCNC: 12.3 G/DL (ref 11.5–16)
IMM GRANULOCYTES # BLD AUTO: 0 K/UL (ref 0–0.04)
IMM GRANULOCYTES NFR BLD AUTO: 1 % (ref 0–0.5)
LDLC SERPL CALC-MCNC: 145.6 MG/DL (ref 0–100)
LYMPHOCYTES # BLD: 2.8 K/UL (ref 0.8–3.5)
LYMPHOCYTES NFR BLD: 36 % (ref 12–49)
MCH RBC QN AUTO: 30.6 PG (ref 26–34)
MCHC RBC AUTO-ENTMCNC: 32.3 G/DL (ref 30–36.5)
MCV RBC AUTO: 94.8 FL (ref 80–99)
MICROALBUMIN UR-MCNC: 1.72 MG/DL
MICROALBUMIN/CREAT UR-RTO: 12 MG/G (ref 0–30)
MONOCYTES # BLD: 0.6 K/UL (ref 0–1)
MONOCYTES NFR BLD: 8 % (ref 5–13)
NEUTS SEG # BLD: 4.2 K/UL (ref 1.8–8)
NEUTS SEG NFR BLD: 52 % (ref 32–75)
NRBC # BLD: 0 K/UL (ref 0–0.01)
NRBC BLD-RTO: 0 PER 100 WBC
PLATELET # BLD AUTO: 336 K/UL (ref 150–400)
PMV BLD AUTO: 9.6 FL (ref 8.9–12.9)
POTASSIUM SERPL-SCNC: 3.9 MMOL/L (ref 3.5–5.1)
PROT SERPL-MCNC: 7.9 G/DL (ref 6.4–8.2)
RBC # BLD AUTO: 4.02 M/UL (ref 3.8–5.2)
SODIUM SERPL-SCNC: 141 MMOL/L (ref 136–145)
TRIGL SERPL-MCNC: 157 MG/DL (ref ?–150)
VLDLC SERPL CALC-MCNC: 31.4 MG/DL
WBC # BLD AUTO: 7.8 K/UL (ref 3.6–11)

## 2022-09-12 DIAGNOSIS — E11.65 CONTROLLED TYPE 2 DIABETES MELLITUS WITH HYPERGLYCEMIA, WITHOUT LONG-TERM CURRENT USE OF INSULIN (HCC): ICD-10-CM

## 2022-09-12 DIAGNOSIS — I10 ESSENTIAL HYPERTENSION WITH GOAL BLOOD PRESSURE LESS THAN 130/80: ICD-10-CM

## 2022-09-12 RX ORDER — METFORMIN HYDROCHLORIDE 500 MG/1
TABLET, EXTENDED RELEASE ORAL
Qty: 90 TABLET | Refills: 0 | Status: CANCELLED | OUTPATIENT
Start: 2022-09-12

## 2022-09-12 RX ORDER — CHLORTHALIDONE 25 MG/1
25 TABLET ORAL DAILY
Qty: 90 TABLET | Refills: 0 | Status: CANCELLED | OUTPATIENT
Start: 2022-09-12

## 2022-09-12 RX ORDER — LISINOPRIL 40 MG/1
TABLET ORAL
Qty: 90 TABLET | Refills: 0 | Status: CANCELLED | OUTPATIENT
Start: 2022-09-12

## 2022-09-12 RX ORDER — AMLODIPINE BESYLATE 10 MG/1
10 TABLET ORAL DAILY
Qty: 90 TABLET | Refills: 0 | Status: CANCELLED | OUTPATIENT
Start: 2022-09-12

## 2022-09-12 NOTE — TELEPHONE ENCOUNTER
Last visit 07/14/2022 MD Rosaline Frank  Next appointment 10/14/2022 MD Rosaline Frank  Previous refill encounter(s)   03/17/2022 Hygroton #90 with 1 refill,   03/31/2022:  - João Lavender #30 with 5 refills,   - Norvasc #30 with 5 refills,   - Prinivil #30 with 5 refills. For Pharmacy Admin Tracking Only    Intervention Detail: New Rx: 4, reason: Patient Preference  Time Spent (min): 5      Requested Prescriptions     Pending Prescriptions Disp Refills    amLODIPine (NORVASC) 10 mg tablet 90 Tablet 0     Sig: Take 1 Tablet by mouth daily. metFORMIN ER (GLUCOPHAGE XR) 500 mg tablet 90 Tablet 0     Sig: TAKE ONE TABLET BY MOUTH ONE TIME DAILY WITH DINNER    lisinopriL (PRINIVIL, ZESTRIL) 40 mg tablet 90 Tablet 0     Sig: TAKE ONE-HALF TABLET BY MOUTH TWICE A DAY    chlorthalidone (HYGROTON) 25 mg tablet 90 Tablet 0     Sig: Take 1 Tablet by mouth daily.               ----- Message from Geni Velásquez sent at 9/12/2022 11:16 AM EDT -----  Subject: Refill Request    QUESTIONS  Name of Medication? amLODIPine (NORVASC) 10 mg tablet  Patient-reported dosage and instructions? 1 time daily  How many days do you have left? 0  Preferred Pharmacy? [facility] phone number (if available)? [phone]  ---------------------------------------------------------------------------  --------------,  Name of Medication? metFORMIN ER (GLUCOPHAGE XR) 500 mg tablet  Patient-reported dosage and instructions? 1 TIME DAILY  How many days do you have left? 10  Preferred Pharmacy? [facility] phone number (if available)? [phone]  ---------------------------------------------------------------------------  --------------,  Name of Medication? lisinopriL (PRINIVIL, ZESTRIL) 40 mg tablet  Patient-reported dosage and instructions? 1 time daily  How many days do you have left? 0  Preferred Pharmacy? [facility] phone number (if available)? [phone]  ---------------------------------------------------------------------------  --------------,  Name of Medication? chlorthalidone (HYGROTON) 25 mg tablet  Patient-reported dosage and instructions? 1 time daily  How many days do you have left? 10  Preferred Pharmacy? [address] phone number (if available)? [phone]  ---------------------------------------------------------------------------  --------------  CALL BACK INFO  What is the best way for the office to contact you? OK to leave message on   voicemail  Preferred Call Back Phone Number? [phone]  ---------------------------------------------------------------------------  --------------  SCRIPT ANSWERS  Relationship to Patient?  Self

## 2022-09-13 DIAGNOSIS — E11.65 CONTROLLED TYPE 2 DIABETES MELLITUS WITH HYPERGLYCEMIA, WITHOUT LONG-TERM CURRENT USE OF INSULIN (HCC): ICD-10-CM

## 2022-09-13 DIAGNOSIS — I10 ESSENTIAL HYPERTENSION WITH GOAL BLOOD PRESSURE LESS THAN 130/80: ICD-10-CM

## 2022-09-13 RX ORDER — LISINOPRIL 40 MG/1
TABLET ORAL
Qty: 30 TABLET | Refills: 5 | Status: SHIPPED | OUTPATIENT
Start: 2022-09-13

## 2022-09-13 RX ORDER — AMLODIPINE BESYLATE 10 MG/1
10 TABLET ORAL DAILY
Qty: 30 TABLET | Refills: 5 | Status: SHIPPED | OUTPATIENT
Start: 2022-09-13

## 2022-09-15 RX ORDER — METFORMIN HYDROCHLORIDE 500 MG/1
TABLET, EXTENDED RELEASE ORAL
Qty: 90 TABLET | Refills: 1 | Status: SHIPPED | OUTPATIENT
Start: 2022-09-15

## 2022-09-15 RX ORDER — CHLORTHALIDONE 25 MG/1
25 TABLET ORAL DAILY
Qty: 90 TABLET | Refills: 1 | Status: SHIPPED | OUTPATIENT
Start: 2022-09-15

## 2022-09-16 NOTE — TELEPHONE ENCOUNTER
Lisinopril and amlodipine refilled by LILA Yanez on 9-13-22 for #30-day supply with 5 refills. Refill request(s) approved--metformin, chlorthalidone. Refill protocol details (computer-generated) reviewed, as available. Requested Prescriptions     Signed Prescriptions Disp Refills    chlorthalidone (HYGROTON) 25 mg tablet 90 Tablet 1     Sig: Take 1 Tablet by mouth daily.      Authorizing Provider: Humberto Mason    metFORMIN ER (GLUCOPHAGE XR) 500 mg tablet 90 Tablet 1     Sig: TAKE ONE TABLET BY MOUTH ONE TIME DAILY WITH DINNER     Authorizing Provider: Humberto Mason

## 2022-10-14 ENCOUNTER — APPOINTMENT (OUTPATIENT)
Dept: GENERAL RADIOLOGY | Age: 67
End: 2022-10-14
Attending: PHYSICIAN ASSISTANT
Payer: MEDICARE

## 2022-10-14 ENCOUNTER — HOSPITAL ENCOUNTER (EMERGENCY)
Age: 67
Discharge: HOME | End: 2022-10-15
Attending: EMERGENCY MEDICINE
Payer: MEDICARE

## 2022-10-14 VITALS
BODY MASS INDEX: 32.59 KG/M2 | HEART RATE: 75 BPM | WEIGHT: 190.92 LBS | HEIGHT: 64 IN | SYSTOLIC BLOOD PRESSURE: 163 MMHG | OXYGEN SATURATION: 98 % | DIASTOLIC BLOOD PRESSURE: 76 MMHG | RESPIRATION RATE: 14 BRPM | TEMPERATURE: 98.3 F

## 2022-10-14 DIAGNOSIS — M79.672 ACUTE PAIN OF LEFT FOOT: Primary | ICD-10-CM

## 2022-10-14 PROCEDURE — 99283 EMERGENCY DEPT VISIT LOW MDM: CPT

## 2022-10-14 PROCEDURE — 73630 X-RAY EXAM OF FOOT: CPT

## 2022-10-14 RX ORDER — PREDNISONE 10 MG/1
TABLET ORAL
Qty: 21 TABLET | Refills: 0 | Status: SHIPPED | OUTPATIENT
Start: 2022-10-14

## 2022-10-14 RX ORDER — IBUPROFEN 600 MG/1
600 TABLET ORAL
Qty: 20 TABLET | Refills: 0 | Status: SHIPPED | OUTPATIENT
Start: 2022-10-14

## 2022-10-17 ENCOUNTER — PATIENT OUTREACH (OUTPATIENT)
Dept: CASE MANAGEMENT | Age: 67
End: 2022-10-17

## 2022-10-17 NOTE — PROGRESS NOTES
Ambulatory Care Management Note    Date/Time:  10/17/2022 3:25 PM    This patient was received as a referral from Daily assignment. Ambulatory Care Manager outreached to patient today to offer care management services. Introduction to self and role of care manager provided. Patient accepted care management services at this time. Follow up call scheduled at this time. Patient has Ambulatory Care Manager's contact number for any questions or concerns. Ambulatory Care Management Note    Date/Time:  10/17/2022 3:29 PM    This Ambulatory Care Manager ([facility]) reviewed and updated the following screenings during this call; general assessment, self management assessment, and SDOH assessments    Patient's challenges to self-management identified:   lack of knowledge about disease, level of motivation, and polypharmacy      Medication Management:  good adherence and good understanding    Advance Care Planning:   Does patient have an Advance Directive:   dtr is listed as HCDM, no ACP docs    [employer], Referrals, and Durable Medical Equipment: none      Health Maintenance Due   Topic Date Due    Eye Exam Retinal or Dilated  Never done    COVID-19 Vaccine (3 - Booster for [place] Beatriz series) 10/04/2021    Bone Densitometry (Dexa) Screening  Never done    Flu Vaccine (1) 08/01/2022     Health Maintenance Reviewed: will review on next call    Ambulatory Care Management Note      Date/Time:  10/17/2022 4:10 PM    This patient was received as a referral from Daily assignment. Top Challenges reviewed with the provider   Left ankle pain         Ambulatory  contacted patient for discussion and case management of ankle pain/follow up, diabetic diet & monitoring BG   Summary of patients top problems:   Left lateral Ankle pain- Been going on for 2 months now. Active all day, some swelling, redness; able to wear a shoe and bear weight. No open areas, no injury. Pain comes at night time. Discussed ice/elevation. ER  rx Prednisone & Ibuprofen on 10/14/22. She does have several family members with gout. DM2 - On Metformin once daily monitored by PCP. A1C was 7.6 on 7/14/22. She does not monitor her BG at home or follow diabetic diet. She reports being active/non-sedentary. 3. Hyperlipidemia - On 7/14/22 total chol 233, triglyc 157, .6. On no meds at this time. Discussed following low sat fat/low chol/low sugar/low carb diet, reading food labels. PCP/Specialist follow up: No future appointments. Goals        Coordinate pain management plan for patient. 10/17/22  Pt has had left ankle pain (outer aspect of foot) for the past 2 months. She is active during the day and it tends to give her more pain at night. She has not used ice or elevation. Went to [facility] on 10/14 & was rx Ibuprofen & Prednisone pack; she started this on 10/16. Discussed elevation foot and using ice several times a day, she said she will try this though admits she has a hard time sitting down due to staying so active/busy. She reports several family members do have gout, though she was never told she does; discussed foods that may trigger gout such as sugary drinks/high fructose corn syrup, red meats, some seafood, sauces. Pt will: Take Prednisone until complete, discussed se  Take Ibuprofen as needed  Rest, elevate, ice 3 times a day  Follow up with Ortho as needed  Use Voltaren gel at bedtime  This ACM will follow up with pt in 1 week. -MLL       Patient verbalizes understanding of self -management goals of living with Diabetes. 10/17/22  Pt has DM2 on Metformin once daily, managed by her PCP. She does not monitor her BG at home. Her A1C on 7/14/22 was 7.6. She admits she does like sodas and sweets. Discussed with her reading food labels, portion size, and decreasing sodas/increasing water. She had a lot of questions regarding what is healthy.  Sent My Chart message with Diabetes Placemat, ADA Smart Snacks, and Planning Healthy Meals (contains info about how to read food labels). Discussed eating frozen veggies vs canned, whole grains vs simple carbs, and lower glycemic fruits (berries, apples, pears); and lower carb breads. Pt verbalized her appreciation and understanding. She does have a glucometer, discussed checking fasting BG to see how her BG responds to foods; discussed normal BG levels; discussed complications of diabetes. Pt will: Follow Diabetes Placemat  Be mindful of carb intake, read food labels, portion size  Decrease/eliminate soda intake  Regarding sweets - eat one serving size & desert should not be everyday  Monitor fasting BG 3 times a week  This ACM will follow up with pt in 1 week. -MLL               Patient verbalized understanding of all information discussed. Patient has this Ambulatory Care Manager's contact information for any further questions, concerns, or needs.

## 2022-10-25 DIAGNOSIS — Z91.09 ENVIRONMENTAL ALLERGIES: ICD-10-CM

## 2022-10-29 RX ORDER — CETIRIZINE HYDROCHLORIDE 10 MG/1
TABLET ORAL
Qty: 90 TABLET | Refills: 3 | Status: SHIPPED | OUTPATIENT
Start: 2022-10-29

## 2022-11-13 DIAGNOSIS — J30.9 ALLERGIC SINUSITIS: ICD-10-CM

## 2022-11-13 RX ORDER — FLUTICASONE PROPIONATE 50 MCG
SPRAY, SUSPENSION (ML) NASAL
Qty: 16 G | Refills: 5 | Status: SHIPPED | OUTPATIENT
Start: 2022-11-13

## 2022-11-15 ENCOUNTER — PATIENT OUTREACH (OUTPATIENT)
Dept: CASE MANAGEMENT | Age: 67
End: 2022-11-15

## 2022-12-21 ENCOUNTER — PATIENT OUTREACH (OUTPATIENT)
Dept: CASE MANAGEMENT | Age: 67
End: 2022-12-21

## 2023-01-16 ENCOUNTER — PATIENT OUTREACH (OUTPATIENT)
Dept: CASE MANAGEMENT | Age: 68
End: 2023-01-16

## 2023-01-17 NOTE — PROGRESS NOTES
Patient has graduated from the Complex Case Management  program on 01/17/23. Unable to reach pt. Patient/family has the ability to self-manage at this time. Care management goals have been completed. No further Ambulatory Care Manager follow up scheduled. Goals Addressed                   This Visit's Progress     COMPLETED: Coordinate pain management plan for patient. 12/21/22  Pt has been seeing Dr Timmy Soto and has tried various things for her ankle/foot - boot, brace, will be starting laser therapy for 6 weeks  No swelling  Unable to wear brace with shoes, too tight  Tries to elevate when home, on the go a lot  Plan to follow up in 2-3 weeks & discuss graduation. Zoltan Johnson MSN, RN - Ambulatory Care Manager - [phone]     10/17/22  Pt has had left ankle pain (outer aspect of foot) for the past 2 months. She is active during the day and it tends to give her more pain at night. She has not used ice or elevation. Went to [facility] on 10/14 & was rx Ibuprofen & Prednisone pack; she started this on 10/16. Discussed elevation foot and using ice several times a day, she said she will try this though admits she has a hard time sitting down due to staying so active/busy. She reports several family members do have gout, though she was never told she does; discussed foods that may trigger gout such as sugary drinks/high fructose corn syrup, red meats, some seafood, sauces. Pt will: Take Prednisone until complete, discussed se  Take Ibuprofen as needed  Rest, elevate, ice 3 times a day  Follow up with Ortho as needed  Use Voltaren gel at bedtime  This ACM will follow up with pt in 1 week. -MLL       Patient verbalizes understanding of self -management goals of living with Diabetes. 01/16/23  Attempted to outreach with pt for CM follow up. LM for return call. Zoltan Johnson MSN, RN - Ambulatory Care Manager - [phone]     12/21/22  Takes meds as directed  Continues to drink sodas. Admits to not drinking enough water, discussed flavoring her water  She is not monitoring her bG at home  Discussed diabetes placemat, smart snacks. Being mindful of food choices. Eating more fruits, veggies. Consider portion size/no seconds. Discussed before attending holiday gathering, have a snack or light meal to avoid over indulging. Hydrate with water before going. -She thought these were good ideas and said she will try. Discussed good control of Diabetes for healing and to prevent complications  Pt has chosen new Dr with Sheree Sanz - Dr Gab Durham - first appoint 12/22  Plan to follow up with pt in 2-3 weeks & discuss graduation. Juana Crowe, MSN, RN - Ambulatory Care Manager - [phone]     11/15/22  Attempted to outreach with pt, lm to return this call. Juana Crowe MSN, RN - Ambulatory Care Manager - [phone]     10/17/22  Pt has DM2 on Metformin once daily, managed by her PCP. She does not monitor her BG at home. Her A1C on 7/14/22 was 7.6. She admits she does like sodas and sweets. Discussed with her reading food labels, portion size, and decreasing sodas/increasing water. She had a lot of questions regarding what is healthy. Sent My Chart message with Diabetes Placemat, ADA Smart Snacks, and Planning Healthy Meals (contains info about how to read food labels). Discussed eating frozen veggies vs canned, whole grains vs simple carbs, and lower glycemic fruits (berries, apples, pears); and lower carb breads. Pt verbalized her appreciation and understanding. She does have a glucometer, discussed checking fasting BG to see how her BG responds to foods; discussed normal BG levels; discussed complications of diabetes. Pt will:   Follow Diabetes Placemat  Be mindful of carb intake, read food labels, portion size  Decrease/eliminate soda intake  Regarding sweets - eat one serving size & desert should not be everyday  Monitor fasting BG 3 times a week  This ACM will follow up with pt in 1 week. -MLL              Patient has Ambulatory Care Manager's contact information for any further questions, concerns, or needs. Patients upcoming visits:  No future appointments.

## 2023-02-22 ENCOUNTER — TRANSCRIBE ORDER (OUTPATIENT)
Dept: SCHEDULING | Age: 68
End: 2023-02-22

## 2023-02-22 DIAGNOSIS — Z12.31 VISIT FOR SCREENING MAMMOGRAM: Primary | ICD-10-CM

## 2023-04-22 DIAGNOSIS — Z12.31 VISIT FOR SCREENING MAMMOGRAM: Primary | ICD-10-CM

## 2023-04-27 ENCOUNTER — HOSPITAL ENCOUNTER (OUTPATIENT)
Dept: MAMMOGRAPHY | Age: 68
Discharge: HOME | End: 2023-04-27
Attending: NURSE PRACTITIONER
Payer: MEDICARE

## 2023-04-27 DIAGNOSIS — Z12.31 VISIT FOR SCREENING MAMMOGRAM: ICD-10-CM

## 2023-04-27 PROCEDURE — 77063 BREAST TOMOSYNTHESIS BI: CPT

## 2023-06-05 NOTE — TELEPHONE ENCOUNTER
Future Appointments:  No future appointments.      Last Appointment With Me:  7/14/2022     Requested Prescriptions     Pending Prescriptions Disp Refills    chlorthalidone (HYGROTON) 25 MG tablet [Pharmacy Med Name: CHLORTHALIDONE 25 MG TAB] 90 tablet 1     Sig: TAKE ONE TABLET BY MOUTH ONE TIME DAILY

## 2023-06-06 NOTE — TELEPHONE ENCOUNTER
Future Appointments:  No future appointments.      Last Appointment With Me:  7/14/2022     Requested Prescriptions     Pending Prescriptions Disp Refills    metFORMIN (GLUCOPHAGE-XR) 500 MG extended release tablet [Pharmacy Med Name: METFORMIN EXT  MG TAB(GLUCOPHAGE)] 90 tablet 1     Sig: TAKE ONE TABLET BY MOUTH ONE TIME DAILY WITH DINNER

## 2023-06-09 NOTE — TELEPHONE ENCOUNTER
Duplicate request: Request was created in the 06/04/2023 encounter. --------------------------------------  Pt is a pt of Dr. Ke Bates. Last appointment: 07/14/2022 MD Perri Murcia   Next appointment: Nothing scheduled   Previous refill encounter(s):   09/15/2022 Hygroton #90 with 1 refill.      For Pharmacy Admin Tracking Only    Program: Medication Refill  Intervention Detail: New Rx: 1, reason: Patient Preference  Time Spent (min): 5      Requested Prescriptions     Pending Prescriptions Disp Refills    chlorthalidone (HYGROTON) 25 MG tablet 90 tablet 0     Sig: Take 1 tablet by mouth daily

## 2023-06-09 NOTE — TELEPHONE ENCOUNTER
Duplicate requests: Requests where created in the 06/06/2023 encounter. --------------------------------------  Pt is a pt of Dr. Seb Fritz. Last appointment: 07/14/2022 MD Ike Vaughn   Next appointment: Nothing scheduled   Previous refill encounter(s):   09/15/2022 Glucophage-XR #90 with 1 refill.      For Pharmacy Admin Tracking Only    Program: Medication Refill  Intervention Detail: New Rx: 1, reason: Patient Preference  Time Spent (min): 5      Requested Prescriptions     Pending Prescriptions Disp Refills    metFORMIN (GLUCOPHAGE-XR) 500 MG extended release tablet 90 tablet 0     Sig: TAKE ONE TABLET BY MOUTH ONE TIME DAILY WITH DINNER

## 2023-06-11 RX ORDER — METFORMIN HYDROCHLORIDE 500 MG/1
TABLET, EXTENDED RELEASE ORAL
Qty: 90 TABLET | Refills: 1 | OUTPATIENT
Start: 2023-06-11

## 2023-06-11 RX ORDER — CHLORTHALIDONE 25 MG/1
25 TABLET ORAL DAILY
Qty: 30 TABLET | Refills: 0 | Status: SHIPPED | OUTPATIENT
Start: 2023-06-11

## 2023-06-11 RX ORDER — METFORMIN HYDROCHLORIDE 500 MG/1
TABLET, EXTENDED RELEASE ORAL
Qty: 30 TABLET | Refills: 0 | Status: SHIPPED | OUTPATIENT
Start: 2023-06-11

## 2023-06-11 RX ORDER — CHLORTHALIDONE 25 MG/1
TABLET ORAL
Qty: 90 TABLET | Refills: 1 | OUTPATIENT
Start: 2023-06-11

## 2023-06-11 NOTE — TELEPHONE ENCOUNTER
Refill request(s) approved--metformin--30-day supply with 0 refill(s). Refill protocol details (computer-generated) reviewed, as available. Please contact patient to schedule a follow-up appt. Included in sig:  \"needs follow-up appt. \"

## 2023-12-12 RX ORDER — FLUTICASONE PROPIONATE 50 MCG
SPRAY, SUSPENSION (ML) NASAL
Refills: 5 | OUTPATIENT
Start: 2023-12-12

## 2023-12-12 NOTE — TELEPHONE ENCOUNTER
Pt needs an office visit. LOV 2022 with Dr. CHRISTY Ureña    For Pharmacy Admin Tracking Only    Program: Medication Refill  Intervention Detail: New Rx: 1, reason: Patient Preference  Time Spent (min): 5      Requested Prescriptions     Pending Prescriptions Disp Refills    fluticasone (FLONASE) 50 MCG/ACT nasal spray [Pharmacy Med Name: FLUTICASONE 50 MCG SPRAY]  5     Sig: USE TWO SPRAYS IN EACH NOSTRIL ONE TIME DAILY

## 2024-02-07 ENCOUNTER — TRANSCRIBE ORDERS (OUTPATIENT)
Facility: HOSPITAL | Age: 69
End: 2024-02-07

## 2024-02-07 DIAGNOSIS — Z12.31 VISIT FOR SCREENING MAMMOGRAM: Primary | ICD-10-CM

## 2024-03-14 ENCOUNTER — OFFICE VISIT (OUTPATIENT)
Age: 69
End: 2024-03-14
Payer: MEDICARE

## 2024-03-14 VITALS — BODY MASS INDEX: 30.76 KG/M2 | SYSTOLIC BLOOD PRESSURE: 146 MMHG | WEIGHT: 179.2 LBS | DIASTOLIC BLOOD PRESSURE: 84 MMHG

## 2024-03-14 DIAGNOSIS — Z01.411 ENCOUNTER FOR GYNECOLOGICAL EXAMINATION WITH ABNORMAL FINDING: ICD-10-CM

## 2024-03-14 DIAGNOSIS — M25.561 ACUTE PAIN OF RIGHT KNEE: Primary | ICD-10-CM

## 2024-03-14 DIAGNOSIS — Z12.31 ENCOUNTER FOR SCREENING MAMMOGRAM FOR MALIGNANT NEOPLASM OF BREAST: ICD-10-CM

## 2024-03-14 DIAGNOSIS — Z78.0 ASYMPTOMATIC MENOPAUSAL STATE: ICD-10-CM

## 2024-03-14 DIAGNOSIS — Z13.820 OSTEOPOROSIS SCREENING: ICD-10-CM

## 2024-03-14 DIAGNOSIS — N81.9 FEMALE GENITAL PROLAPSE, UNSPECIFIED TYPE: ICD-10-CM

## 2024-03-14 PROCEDURE — 3079F DIAST BP 80-89 MM HG: CPT | Performed by: OBSTETRICS & GYNECOLOGY

## 2024-03-14 PROCEDURE — 3077F SYST BP >= 140 MM HG: CPT | Performed by: OBSTETRICS & GYNECOLOGY

## 2024-03-14 PROCEDURE — G8484 FLU IMMUNIZE NO ADMIN: HCPCS | Performed by: OBSTETRICS & GYNECOLOGY

## 2024-03-14 PROCEDURE — 99397 PER PM REEVAL EST PAT 65+ YR: CPT | Performed by: OBSTETRICS & GYNECOLOGY

## 2024-03-14 RX ORDER — ATORVASTATIN CALCIUM 20 MG/1
TABLET, FILM COATED ORAL
COMMUNITY
Start: 2024-01-22

## 2024-03-14 RX ORDER — LORATADINE 10 MG/1
10 CAPSULE, LIQUID FILLED ORAL DAILY
COMMUNITY

## 2024-03-14 NOTE — PROGRESS NOTES
sounds    Cardiovascular  Heart:  Auscultation: regular rate and rhythm without murmur  Extremities: no peripheral edema    Breasts  Inspection of Breasts: breasts symmetrical, no skin changes, no discharge present, nipple appearance normal, no skin retraction present  Palpation of Breasts and Axillae: no masses present on palpation, no breast tenderness  Axillary Lymph Nodes: no lymphadenopathy present    Gastrointestinal  Abdominal Examination: abdomen non-tender to palpation, normal bowel sounds, no masses present  Liver and spleen: no hepatomegaly present, spleen not palpable  Hernias: no hernias identified    Genitourinary  External Genitalia: normal appearance for age, no discharge present, no tenderness present, no inflammatory lesions present, no masses present, +atrophy of UG mucosa  Vagina: normal vaginal vault, no discharge present, no inflammatory lesions present, no masses present  Bladder: non-tender to palpation  Urethra: appears normal  Cervix: normal   Uterus: normal size, shape and consistency, small, mobile  Adnexa: no adnexal tenderness present, no adnexal masses present  Perineum: perineum within normal limits, no evidence of trauma, no rashes or skin lesions present    Skin  General Inspection: no rash, no lesions identified    Neurologic/Psychiatric  Mental Status:  Orientation: grossly oriented to person, place and time  Mood and Affect: mood normal, affect appropriate      Assessment/Plan:  69 y.o. postmenopausal female presenting for annual exam. +right knee pain.    Health Maintenance:  -diet, exercise, healthy lifestyle  -pap/HPV no longer indicated  -mammo scheduled  -colonoscopy utd  -dexa referral placed  -Rx for cane provided, fu with PCP re: knee pain as scheduled later today, may need ortho referral    RTC: for WWE or sooner bhavik Parra MD  3/14/2024  11:16 AM

## 2024-04-29 ENCOUNTER — HOSPITAL ENCOUNTER (OUTPATIENT)
Facility: HOSPITAL | Age: 69
Discharge: HOME | End: 2024-05-02
Payer: MEDICARE

## 2024-04-29 VITALS — BODY MASS INDEX: 29.19 KG/M2 | HEIGHT: 64 IN | WEIGHT: 171 LBS

## 2024-04-29 DIAGNOSIS — Z12.31 VISIT FOR SCREENING MAMMOGRAM: ICD-10-CM

## 2024-04-29 PROCEDURE — 77067 SCR MAMMO BI INCL CAD: CPT

## 2024-06-29 RX ORDER — CHLORTHALIDONE 25 MG/1
25 TABLET ORAL DAILY
Qty: 30 TABLET | Refills: 0 | OUTPATIENT
Start: 2024-06-29

## 2024-09-09 ENCOUNTER — HOSPITAL ENCOUNTER (OUTPATIENT)
Facility: HOSPITAL | Age: 69
Discharge: HOME | End: 2024-09-12
Payer: MEDICARE
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
## 2024-09-09 DIAGNOSIS — S69.92XA: ICD-10-CM

## 2024-09-09 PROCEDURE — 73140 X-RAY EXAM OF FINGER(S): CPT

## 2025-03-15 ENCOUNTER — HOSPITAL ENCOUNTER (EMERGENCY)
Facility: HOSPITAL | Age: 70
Discharge: HOME | End: 2025-03-15
Attending: EMERGENCY MEDICINE
Payer: MEDICARE

## 2025-03-15 ENCOUNTER — APPOINTMENT (OUTPATIENT)
Facility: HOSPITAL | Age: 70
End: 2025-03-15
Payer: MEDICARE

## 2025-03-15 VITALS
DIASTOLIC BLOOD PRESSURE: 65 MMHG | HEIGHT: 64 IN | BODY MASS INDEX: 32.07 KG/M2 | HEART RATE: 68 BPM | RESPIRATION RATE: 20 BRPM | SYSTOLIC BLOOD PRESSURE: 150 MMHG | OXYGEN SATURATION: 95 % | WEIGHT: 187.83 LBS | TEMPERATURE: 98.4 F

## 2025-03-15 DIAGNOSIS — I16.0 HYPERTENSIVE URGENCY: Primary | ICD-10-CM

## 2025-03-15 DIAGNOSIS — M54.2 NECK PAIN: ICD-10-CM

## 2025-03-15 DIAGNOSIS — R06.02 SHORTNESS OF BREATH: ICD-10-CM

## 2025-03-15 LAB
ALBUMIN SERPL-MCNC: 3.8 G/DL (ref 3.5–5)
ALBUMIN/GLOB SERPL: 1 (ref 1.1–2.2)
ALP SERPL-CCNC: 83 U/L (ref 45–117)
ALT SERPL-CCNC: 27 U/L (ref 12–78)
ANION GAP SERPL CALC-SCNC: 5 MMOL/L (ref 2–12)
AST SERPL-CCNC: 24 U/L (ref 15–37)
BASOPHILS # BLD: 0.05 K/UL (ref 0–0.1)
BASOPHILS NFR BLD: 0.6 % (ref 0–1)
BILIRUB SERPL-MCNC: 0.5 MG/DL (ref 0.2–1)
BUN SERPL-MCNC: 9 MG/DL (ref 6–20)
BUN/CREAT SERPL: 11 (ref 12–20)
CALCIUM SERPL-MCNC: 9.5 MG/DL (ref 8.5–10.1)
CHLORIDE SERPL-SCNC: 105 MMOL/L (ref 97–108)
CO2 SERPL-SCNC: 29 MMOL/L (ref 21–32)
CREAT SERPL-MCNC: 0.82 MG/DL (ref 0.55–1.02)
DIFFERENTIAL METHOD BLD: NORMAL
EOSINOPHIL # BLD: 0.03 K/UL (ref 0–0.4)
EOSINOPHIL NFR BLD: 0.4 % (ref 0–7)
ERYTHROCYTE [DISTWIDTH] IN BLOOD BY AUTOMATED COUNT: 13.2 % (ref 11.5–14.5)
GLOBULIN SER CALC-MCNC: 3.7 G/DL (ref 2–4)
GLUCOSE SERPL-MCNC: 196 MG/DL (ref 65–100)
HCT VFR BLD AUTO: 37.6 % (ref 35–47)
HGB BLD-MCNC: 12.5 G/DL (ref 11.5–16)
IMM GRANULOCYTES # BLD AUTO: 0.03 K/UL (ref 0–0.04)
IMM GRANULOCYTES NFR BLD AUTO: 0.4 % (ref 0–0.5)
LYMPHOCYTES # BLD: 1.94 K/UL (ref 0.8–3.5)
LYMPHOCYTES NFR BLD: 23.8 % (ref 12–49)
MCH RBC QN AUTO: 30.8 PG (ref 26–34)
MCHC RBC AUTO-ENTMCNC: 33.2 G/DL (ref 30–36.5)
MCV RBC AUTO: 92.6 FL (ref 80–99)
MONOCYTES # BLD: 0.51 K/UL (ref 0–1)
MONOCYTES NFR BLD: 6.3 % (ref 5–13)
NEUTS SEG # BLD: 5.58 K/UL (ref 1.8–8)
NEUTS SEG NFR BLD: 68.5 % (ref 32–75)
NRBC # BLD: 0 K/UL (ref 0–0.01)
NRBC BLD-RTO: 0 PER 100 WBC
PLATELET # BLD AUTO: 299 K/UL (ref 150–400)
PMV BLD AUTO: 9.7 FL (ref 8.9–12.9)
POTASSIUM SERPL-SCNC: 3.6 MMOL/L (ref 3.5–5.1)
PROT SERPL-MCNC: 7.5 G/DL (ref 6.4–8.2)
RBC # BLD AUTO: 4.06 M/UL (ref 3.8–5.2)
SODIUM SERPL-SCNC: 139 MMOL/L (ref 136–145)
TROPONIN I SERPL HS-MCNC: 13 NG/L (ref 0–51)
TROPONIN I SERPL HS-MCNC: 14 NG/L (ref 0–51)
WBC # BLD AUTO: 8.1 K/UL (ref 3.6–11)

## 2025-03-15 PROCEDURE — 36415 COLL VENOUS BLD VENIPUNCTURE: CPT

## 2025-03-15 PROCEDURE — 85025 COMPLETE CBC W/AUTO DIFF WBC: CPT

## 2025-03-15 PROCEDURE — 96374 THER/PROPH/DIAG INJ IV PUSH: CPT

## 2025-03-15 PROCEDURE — 80053 COMPREHEN METABOLIC PANEL: CPT

## 2025-03-15 PROCEDURE — 6370000000 HC RX 637 (ALT 250 FOR IP): Performed by: EMERGENCY MEDICINE

## 2025-03-15 PROCEDURE — 99285 EMERGENCY DEPT VISIT HI MDM: CPT

## 2025-03-15 PROCEDURE — 71045 X-RAY EXAM CHEST 1 VIEW: CPT

## 2025-03-15 PROCEDURE — 6360000002 HC RX W HCPCS: Performed by: EMERGENCY MEDICINE

## 2025-03-15 PROCEDURE — 93005 ELECTROCARDIOGRAM TRACING: CPT | Performed by: EMERGENCY MEDICINE

## 2025-03-15 PROCEDURE — 84484 ASSAY OF TROPONIN QUANT: CPT

## 2025-03-15 RX ORDER — LABETALOL HYDROCHLORIDE 5 MG/ML
10 INJECTION, SOLUTION INTRAVENOUS
Status: COMPLETED | OUTPATIENT
Start: 2025-03-15 | End: 2025-03-15

## 2025-03-15 RX ORDER — AMLODIPINE BESYLATE 5 MG/1
5 TABLET ORAL
Status: COMPLETED | OUTPATIENT
Start: 2025-03-15 | End: 2025-03-15

## 2025-03-15 RX ORDER — LISINOPRIL 5 MG/1
10 TABLET ORAL
Status: COMPLETED | OUTPATIENT
Start: 2025-03-15 | End: 2025-03-15

## 2025-03-15 RX ADMIN — LISINOPRIL 10 MG: 5 TABLET ORAL at 12:18

## 2025-03-15 RX ADMIN — LABETALOL HYDROCHLORIDE 10 MG: 5 INJECTION, SOLUTION INTRAVENOUS at 12:18

## 2025-03-15 RX ADMIN — AMLODIPINE BESYLATE 5 MG: 5 TABLET ORAL at 12:18

## 2025-03-15 ASSESSMENT — PAIN DESCRIPTION - DESCRIPTORS: DESCRIPTORS: TIGHTNESS

## 2025-03-15 ASSESSMENT — PAIN DESCRIPTION - LOCATION: LOCATION: NECK

## 2025-03-15 ASSESSMENT — PAIN SCALES - GENERAL: PAINLEVEL_OUTOF10: 7

## 2025-03-15 ASSESSMENT — LIFESTYLE VARIABLES
HOW OFTEN DO YOU HAVE A DRINK CONTAINING ALCOHOL: NEVER
HOW MANY STANDARD DRINKS CONTAINING ALCOHOL DO YOU HAVE ON A TYPICAL DAY: PATIENT DOES NOT DRINK

## 2025-03-15 NOTE — ED PROVIDER NOTES
BUN/Creatinine Ratio 11 (*)     Albumin/Globulin Ratio 1.0 (*)     All other components within normal limits   CBC WITH AUTO DIFFERENTIAL   TROPONIN   TROPONIN          EKG: If performed, independent interpretation documented below in the MDM section     RADIOLOGY:  Non-plain film images such as CT, Ultrasound and MRI are read by the radiologist. Plain radiographic images are visualized and preliminarily interpreted by the ED Provider with the findings documented in the MDM section.     Interpretation per the Radiologist below, if available at the time of this note:     [unfilled]   XR CHEST PORTABLE  EKG 12-LEAD       PROCEDURES   Unless otherwise noted below, none  Procedures     CRITICAL CARE TIME   0    EMERGENCY DEPARTMENT COURSE and DIFFERENTIAL DIAGNOSIS/MDM   Vitals:    Vitals:    03/15/25 1300 03/15/25 1315 03/15/25 1330 03/15/25 1345   BP: (!) 170/67 (!) 160/64 (!) 144/56 (!) 150/65   Pulse: 64 64 64 68   Resp: 15 14 14 20   Temp:       TempSrc:       SpO2: 94% 94% 94% 95%   Weight:       Height:            Patient was given the following medications:  Medications   amLODIPine (NORVASC) tablet 5 mg (5 mg Oral Given 3/15/25 1218)   lisinopril (PRINIVIL;ZESTRIL) tablet 10 mg (10 mg Oral Given 3/15/25 1218)   labetalol (NORMODYNE;TRANDATE) injection 10 mg (10 mg IntraVENous Given 3/15/25 1218)       Medical Decision Making  Amount and/or Complexity of Data Reviewed  Independent Historian: caregiver     Details: Daughter  Labs: ordered. Decision-making details documented in ED Course.  Radiology: ordered. Decision-making details documented in ED Course.  ECG/medicine tests: ordered and independent interpretation performed. Decision-making details documented in ED Course.    Risk  OTC drugs.  Prescription drug management.  Decision regarding hospitalization.  Diagnosis or treatment significantly limited by social determinants of health.  Risk Details: Noncompliance with hypertensive medications, counseling  ZYRTEC     chlorthalidone 25 MG tablet  Commonly known as: HYGROTON  Take 1 tablet by mouth daily     fluticasone 50 MCG/ACT nasal spray  Commonly known as: FLONASE     ibuprofen 600 MG tablet  Commonly known as: ADVIL;MOTRIN     lisinopril 40 MG tablet  Commonly known as: PRINIVIL;ZESTRIL     loratadine 10 MG capsule  Commonly known as: CLARITIN     Melatonin 5 MG Caps     metFORMIN 500 MG extended release tablet  Commonly known as: GLUCOPHAGE-XR  TAKE ONE TABLET BY MOUTH ONE TIME DAILY WITH DINNER     predniSONE 10 MG (21) Tbpk     Sutab 7548-628-028 MG Tabs  Generic drug: Sodium Sulfate-Mag Sulfate-KCl     VITAMIN D PO                DISCONTINUED MEDICATIONS:  Discharge Medication List as of 3/15/2025  2:09 PM          I am the Primary Clinician of Record.   Nelson Stark MD (electronically signed)    (Please note that parts of this dictation were completed with voice recognition software. Quite often unanticipated grammatical, syntax, homophones, and other interpretive errors are inadvertently transcribed by the computer software. Please disregards these errors. Please excuse any errors that have escaped final proofreading.)       Nelson Stark MD  03/15/25 8118

## 2025-03-15 NOTE — ED NOTES
Assumed care of patient. Patient complaining of generalized neck pain and panic attacks that occurred last night after receiving bad news about her father. Patient placed on monitor x3 with call bell within reach and side rails x2.

## 2025-03-15 NOTE — DISCHARGE INSTRUCTIONS
You were evaluated in the emergency department for neck pain, shortness of breath, and high blood pressure.  Your examination was reassuring as was your work-up including lab work, EKG, and chest x-ray.  It will be important for you to follow-up with your primary care physician in 2-3 days.  Please make sure you are taking your blood pressure medicine as directed.  If you develop worsening symptoms such as worsening shortness of breath, neck pain, dizziness, passing out episodes, shaking episodes, or any chest pain, please return to the emergency department immediately.

## 2025-03-16 LAB
EKG ATRIAL RATE: 82 BPM
EKG DIAGNOSIS: NORMAL
EKG P AXIS: 47 DEGREES
EKG P-R INTERVAL: 162 MS
EKG Q-T INTERVAL: 360 MS
EKG QRS DURATION: 94 MS
EKG QTC CALCULATION (BAZETT): 420 MS
EKG R AXIS: -13 DEGREES
EKG T AXIS: 104 DEGREES
EKG VENTRICULAR RATE: 82 BPM

## (undated) DEVICE — Device

## (undated) DEVICE — ELECTRODE,RADIOTRANSLUCENT,FOAM,5PK: Brand: MEDLINE

## (undated) DEVICE — NON-REM POLYHESIVE PATIENT RETURN ELECTRODE: Brand: VALLEYLAB

## (undated) DEVICE — SYR 3ML LL TIP 1/10ML GRAD --

## (undated) DEVICE — TRAP,MUCUS SPECIMEN, 80CC: Brand: MEDLINE

## (undated) DEVICE — TOWEL 4 PLY TISS 19X30 SUE WHT

## (undated) DEVICE — STRAINER URIN CALC RNL MSH -- CONVERT TO ITEM 357634

## (undated) DEVICE — Z DISCONTINUED PER MEDLINE LINE GAS SAMPLING O2/CO2 LNG AD 13 FT NSL W/ TBNG FILTERLINE

## (undated) DEVICE — SYR 10ML LUER LOK 1/5ML GRAD --

## (undated) DEVICE — NEONATAL-ADULT SPO2 SENSOR: Brand: NELLCOR

## (undated) DEVICE — SOLIDIFIER FLD 2OZ 1500CC N DISINF IN BTL DISP SAFESORB

## (undated) DEVICE — CATH IV AUTOGRD BC PNK 20GA 25 -- INSYTE

## (undated) DEVICE — SNARE ENDOSCP M L240CM W27MM SHTH DIA2.4MM CHN 2.8MM OVL

## (undated) DEVICE — SET ADMIN 16ML TBNG L100IN 2 Y INJ SITE IV PIGGY BK DISP

## (undated) DEVICE — HYPODERMIC SAFETY NEEDLE: Brand: MAGELLAN

## (undated) DEVICE — 1200 GUARD II KIT W/5MM TUBE W/O VAC TUBE: Brand: GUARDIAN

## (undated) DEVICE — CONTAINER SPEC 20 ML LID NEUT BUFF FORMALIN 10 % POLYPR STS

## (undated) DEVICE — BASIN EMSIS 16OZ GRAPHITE PLAS KID SHP MOLD GRAD FOR ORAL